# Patient Record
Sex: FEMALE | Race: WHITE | Employment: OTHER | URBAN - METROPOLITAN AREA
[De-identification: names, ages, dates, MRNs, and addresses within clinical notes are randomized per-mention and may not be internally consistent; named-entity substitution may affect disease eponyms.]

---

## 2017-01-01 ENCOUNTER — HOSPITAL ENCOUNTER (OUTPATIENT)
Dept: INFUSION THERAPY | Age: 67
Discharge: HOME | End: 2017-01-31
Attending: INTERNAL MEDICINE | Admitting: INTERNAL MEDICINE

## 2017-02-02 ENCOUNTER — HOSPITAL ENCOUNTER (OUTPATIENT)
Dept: CARDIOLOGY | Age: 67
Discharge: HOME | End: 2017-02-02
Attending: INTERNAL MEDICINE | Admitting: INTERNAL MEDICINE

## 2017-02-02 DIAGNOSIS — I35.0 NONRHEUMATIC AORTIC VALVE STENOSIS: ICD-10-CM

## 2017-02-02 LAB
LV EF: 50 %
LVEF MODALITY: NORMAL

## 2017-06-20 ENCOUNTER — TELEPHONE (OUTPATIENT)
Dept: FAMILY MEDICINE CLINIC | Age: 67
End: 2017-06-20

## 2017-06-29 ENCOUNTER — HOSPITAL ENCOUNTER (OUTPATIENT)
Dept: INFUSION THERAPY | Age: 67
Discharge: HOME | End: 2017-06-29
Attending: INTERNAL MEDICINE | Admitting: INTERNAL MEDICINE

## 2017-06-29 VITALS
SYSTOLIC BLOOD PRESSURE: 104 MMHG | HEART RATE: 76 BPM | TEMPERATURE: 98.5 F | DIASTOLIC BLOOD PRESSURE: 73 MMHG | RESPIRATION RATE: 16 BRPM

## 2017-06-29 LAB
HCT VFR BLD CALC: 50.5 % (ref 37–47)
HEMOGLOBIN: 17.4 GM/DL (ref 12.5–16)

## 2017-06-29 ASSESSMENT — PAIN SCALES - GENERAL: PAINLEVEL_OUTOF10: 0

## 2017-07-06 ENCOUNTER — HOSPITAL ENCOUNTER (OUTPATIENT)
Dept: INFUSION THERAPY | Age: 67
Discharge: HOME | End: 2017-07-31
Attending: INTERNAL MEDICINE | Admitting: INTERNAL MEDICINE

## 2017-07-06 VITALS — DIASTOLIC BLOOD PRESSURE: 65 MMHG | HEART RATE: 76 BPM | SYSTOLIC BLOOD PRESSURE: 109 MMHG | RESPIRATION RATE: 16 BRPM

## 2017-07-06 LAB
HCT VFR BLD CALC: 45.4 % (ref 37–47)
HEMOGLOBIN: 15.4 GM/DL (ref 12.5–16)

## 2017-07-13 ENCOUNTER — HOSPITAL ENCOUNTER (OUTPATIENT)
Dept: INFUSION THERAPY | Age: 67
Discharge: HOME | End: 2017-07-13
Attending: INTERNAL MEDICINE | Admitting: INTERNAL MEDICINE

## 2017-07-13 VITALS — DIASTOLIC BLOOD PRESSURE: 66 MMHG | HEART RATE: 72 BPM | SYSTOLIC BLOOD PRESSURE: 106 MMHG | RESPIRATION RATE: 16 BRPM

## 2017-07-13 LAB
HCT VFR BLD CALC: 43.6 % (ref 37–47)
HEMOGLOBIN: 14.6 GM/DL (ref 12.5–16)

## 2017-08-01 ENCOUNTER — HOSPITAL ENCOUNTER (OUTPATIENT)
Dept: OTHER | Age: 67
Discharge: HOME | End: 2017-08-31
Attending: INTERNAL MEDICINE | Admitting: INTERNAL MEDICINE

## 2017-09-19 ENCOUNTER — HOSPITAL ENCOUNTER (OUTPATIENT)
Dept: MRI IMAGING | Age: 67
Discharge: HOME | End: 2017-09-19
Attending: ORTHOPAEDIC SURGERY | Admitting: ORTHOPAEDIC SURGERY

## 2017-09-19 DIAGNOSIS — M19.012 ARTHRITIS OF LEFT SHOULDER REGION: ICD-10-CM

## 2018-09-13 ENCOUNTER — HOSPITAL ENCOUNTER (OUTPATIENT)
Dept: CT IMAGING | Age: 68
Discharge: HOME | End: 2018-09-13
Attending: UROLOGY | Admitting: UROLOGY

## 2018-09-13 DIAGNOSIS — R31.9 HEMATURIA SYNDROME: ICD-10-CM

## 2018-09-13 LAB
GFR AFRICAN AMERICAN: 48 ML/MIN/1.73M2
GFR NON-AFRICAN AMERICAN: 40 ML/MIN/1.73M2
POC CREATININE: 1.3 MG/DL (ref 0.6–1.1)

## 2018-09-13 RX ORDER — SODIUM CHLORIDE 0.9 % (FLUSH) 0.9 %
10 SYRINGE (ML) INJECTION ONCE
Status: COMPLETED | OUTPATIENT
Start: 2018-09-13 | End: 2018-09-13

## 2018-09-13 RX ADMIN — Medication 10 ML: at 08:39

## 2018-10-16 ENCOUNTER — HOSPITAL ENCOUNTER (OUTPATIENT)
Dept: INFUSION THERAPY | Age: 68
Setting detail: INFUSION SERIES
Discharge: HOME | End: 2018-10-16
Payer: MEDICARE

## 2018-10-16 VITALS
RESPIRATION RATE: 18 BRPM | TEMPERATURE: 98.2 F | HEART RATE: 81 BPM | DIASTOLIC BLOOD PRESSURE: 68 MMHG | OXYGEN SATURATION: 97 % | SYSTOLIC BLOOD PRESSURE: 108 MMHG

## 2018-10-16 PROCEDURE — 99195 PHLEBOTOMY: CPT

## 2018-10-16 NOTE — PROGRESS NOTES
Diagnosis:  POLYCYTHEMIA VERA               Pre-phlebotomy:  Pulse:  78   Blood Pressure:  139 75    Post-phlebotomy:  Pulse:  81   Blood Pressure:  108 68    Volume Removed:  537 GRAMS    Complications:  NONE    Comments:  NONE

## 2018-11-09 ENCOUNTER — HOSPITAL ENCOUNTER (OUTPATIENT)
Age: 68
Setting detail: SPECIMEN
Discharge: HOME | End: 2018-11-09
Payer: MEDICARE

## 2018-11-09 LAB — FERRITIN: 21 NG/ML (ref 15–150)

## 2018-11-09 PROCEDURE — 82728 ASSAY OF FERRITIN: CPT

## 2019-02-08 ENCOUNTER — HOSPITAL ENCOUNTER (OUTPATIENT)
Dept: INFUSION THERAPY | Age: 69
Setting detail: INFUSION SERIES
Discharge: HOME | End: 2019-02-08
Payer: MEDICARE

## 2019-02-08 VITALS
DIASTOLIC BLOOD PRESSURE: 87 MMHG | HEART RATE: 87 BPM | SYSTOLIC BLOOD PRESSURE: 120 MMHG | RESPIRATION RATE: 14 BRPM | OXYGEN SATURATION: 92 %

## 2019-02-08 PROCEDURE — 99195 PHLEBOTOMY: CPT

## 2019-02-08 PROCEDURE — 99211 OFF/OP EST MAY X REQ PHY/QHP: CPT

## 2019-02-08 NOTE — PROGRESS NOTES
Pt taken to room 6 chair 2, oriented to room, chair controls, and call light. Needs met at present. Call light in reach. Pt agreeable for plan of care.

## 2019-02-08 NOTE — PROGRESS NOTES
Diagnosis: Polycythemia vera    Pre-Phlebotomy: /92 , HR 85    Post-Phlebotomy: /87, HR 87    Volume Removed:  751 grams    Complications: None    Comments: Pt clotted off @@ 459 grams but tolerated procedure without complications.     [address]

## 2019-02-15 ENCOUNTER — HOSPITAL ENCOUNTER (OUTPATIENT)
Dept: INFUSION THERAPY | Age: 69
Setting detail: INFUSION SERIES
Discharge: HOME | End: 2019-02-15
Payer: MEDICARE

## 2019-02-15 VITALS
SYSTOLIC BLOOD PRESSURE: 126 MMHG | HEART RATE: 91 BPM | OXYGEN SATURATION: 93 % | WEIGHT: 170 LBS | BODY MASS INDEX: 30.12 KG/M2 | TEMPERATURE: 98 F | HEIGHT: 63 IN | DIASTOLIC BLOOD PRESSURE: 89 MMHG | RESPIRATION RATE: 14 BRPM

## 2019-02-15 PROCEDURE — 99211 OFF/OP EST MAY X REQ PHY/QHP: CPT

## 2019-02-15 PROCEDURE — 99195 PHLEBOTOMY: CPT

## 2019-02-15 ASSESSMENT — PAIN DESCRIPTION - ORIENTATION: ORIENTATION: RIGHT

## 2019-02-15 ASSESSMENT — PAIN DESCRIPTION - FREQUENCY: FREQUENCY: CONTINUOUS

## 2019-02-15 ASSESSMENT — PAIN DESCRIPTION - DESCRIPTORS: DESCRIPTORS: ACHING

## 2019-02-15 ASSESSMENT — PAIN SCALES - GENERAL: PAINLEVEL_OUTOF10: 5

## 2019-02-15 ASSESSMENT — PAIN DESCRIPTION - LOCATION: LOCATION: ARM

## 2019-02-15 ASSESSMENT — PAIN DESCRIPTION - ONSET: ONSET: ON-GOING

## 2019-02-15 ASSESSMENT — PAIN DESCRIPTION - PAIN TYPE: TYPE: CHRONIC PAIN

## 2019-02-15 NOTE — PROGRESS NOTES
Reviewed discharge instructions, voiced understanding, and copies of AVS given to take home. Pt tolerated therapeutic phlebotomy well, with no s/s of an allergic reaction. Pt to private auto via steady gait.

## 2019-02-15 NOTE — PROGRESS NOTES
Diagnosis: Polycythemia Vera    Pre-Phlebotomy: /89, HR 80    Post-Phlebotomy: /89, HR 91    Volume Removed: 245 grams    Complications: None    Comments: Tolerated procedure without complications.      [address]

## 2019-02-22 ENCOUNTER — HOSPITAL ENCOUNTER (OUTPATIENT)
Age: 69
Setting detail: SPECIMEN
Discharge: HOME | End: 2019-02-22
Payer: MEDICARE

## 2019-02-22 ENCOUNTER — HOSPITAL ENCOUNTER (OUTPATIENT)
Dept: INFUSION THERAPY | Age: 69
Setting detail: INFUSION SERIES
Discharge: HOME | End: 2019-02-22
Payer: MEDICARE

## 2019-02-22 VITALS
TEMPERATURE: 97.4 F | SYSTOLIC BLOOD PRESSURE: 116 MMHG | OXYGEN SATURATION: 95 % | HEART RATE: 89 BPM | RESPIRATION RATE: 14 BRPM | DIASTOLIC BLOOD PRESSURE: 74 MMHG

## 2019-02-22 LAB
ALBUMIN SERPL-MCNC: 4.4 GM/DL (ref 3.4–5)
ALP BLD-CCNC: 140 IU/L (ref 40–129)
ALT SERPL-CCNC: 11 U/L (ref 10–40)
ANION GAP SERPL CALCULATED.3IONS-SCNC: 14 MMOL/L (ref 4–16)
AST SERPL-CCNC: 14 IU/L (ref 15–37)
BILIRUB SERPL-MCNC: 0.4 MG/DL (ref 0–1)
BUN BLDV-MCNC: 9 MG/DL (ref 6–23)
CALCIUM SERPL-MCNC: 9 MG/DL (ref 8.3–10.6)
CHLORIDE BLD-SCNC: 98 MMOL/L (ref 99–110)
CO2: 29 MMOL/L (ref 21–32)
CREAT SERPL-MCNC: 1.2 MG/DL (ref 0.6–1.1)
FERRITIN: 38 NG/ML (ref 15–150)
GFR AFRICAN AMERICAN: 54 ML/MIN/1.73M2
GFR NON-AFRICAN AMERICAN: 45 ML/MIN/1.73M2
GLUCOSE BLD-MCNC: 125 MG/DL (ref 70–99)
IRON: 76 UG/DL (ref 37–145)
LACTATE DEHYDROGENASE: 250 IU/L (ref 120–246)
PCT TRANSFERRIN: 29 % (ref 10–44)
POTASSIUM SERPL-SCNC: 4.7 MMOL/L (ref 3.5–5.1)
SODIUM BLD-SCNC: 141 MMOL/L (ref 135–145)
TOTAL IRON BINDING CAPACITY: 266 UG/DL (ref 250–450)
TOTAL PROTEIN: 6.7 GM/DL (ref 6.4–8.2)
UNSATURATED IRON BINDING CAPACITY: 190 UG/DL (ref 110–370)

## 2019-02-22 PROCEDURE — 99211 OFF/OP EST MAY X REQ PHY/QHP: CPT

## 2019-02-22 PROCEDURE — 83540 ASSAY OF IRON: CPT

## 2019-02-22 PROCEDURE — 80053 COMPREHEN METABOLIC PANEL: CPT

## 2019-02-22 PROCEDURE — 99195 PHLEBOTOMY: CPT

## 2019-02-22 PROCEDURE — 83550 IRON BINDING TEST: CPT

## 2019-02-22 PROCEDURE — 83615 LACTATE (LD) (LDH) ENZYME: CPT

## 2019-02-22 PROCEDURE — 82728 ASSAY OF FERRITIN: CPT

## 2019-02-22 ASSESSMENT — PAIN SCALES - GENERAL: PAINLEVEL_OUTOF10: 0

## 2019-02-22 NOTE — DISCHARGE SUMMARY
Diagnosis: Polycythemia vera    Pre-Phlebotomy: /79, HR 77    Post-Phlebotomy: /74, HR 89    Volume Removed: 764 grams    Complications: None    Comments:  Tolerated procedure well    Kristie Locke

## 2019-02-22 NOTE — PROGRESS NOTES
Tolerated phlebotomy procedure well. No complications noted. Will return next week. Appt made for Thursday fEB. 28TH AT 8:00. Pt agreeable for date and time. Discharge instructions given. Voiced understanding. Ambulated to private auto per self.

## 2019-02-28 ENCOUNTER — HOSPITAL ENCOUNTER (OUTPATIENT)
Dept: INFUSION THERAPY | Age: 69
Setting detail: INFUSION SERIES
Discharge: HOME | End: 2019-02-28
Payer: MEDICARE

## 2019-02-28 VITALS
WEIGHT: 170 LBS | DIASTOLIC BLOOD PRESSURE: 66 MMHG | OXYGEN SATURATION: 93 % | BODY MASS INDEX: 30.11 KG/M2 | RESPIRATION RATE: 14 BRPM | HEART RATE: 87 BPM | SYSTOLIC BLOOD PRESSURE: 116 MMHG | TEMPERATURE: 97.5 F

## 2019-02-28 PROCEDURE — 99211 OFF/OP EST MAY X REQ PHY/QHP: CPT

## 2019-02-28 PROCEDURE — 99195 PHLEBOTOMY: CPT

## 2019-02-28 ASSESSMENT — PAIN SCALES - GENERAL: PAINLEVEL_OUTOF10: 0

## 2019-02-28 NOTE — DISCHARGE SUMMARY
Diagnosis: Polycythemia Vera    Pre-Phlebotomy: /86, HR 90    Post-Phlebotomy: /66, HR 87    Volume Removed: 435 grams    Complications: None    Comments: Tolerated procedure well. Denies any symptoms of dizziness or feeling faint. Discharged home.      [address]

## 2019-03-07 ENCOUNTER — HOSPITAL ENCOUNTER (OUTPATIENT)
Dept: INFUSION THERAPY | Age: 69
Setting detail: INFUSION SERIES
Discharge: HOME | End: 2019-03-07
Payer: MEDICARE

## 2019-03-07 VITALS
RESPIRATION RATE: 14 BRPM | TEMPERATURE: 98.7 F | DIASTOLIC BLOOD PRESSURE: 67 MMHG | OXYGEN SATURATION: 95 % | SYSTOLIC BLOOD PRESSURE: 102 MMHG | HEART RATE: 90 BPM

## 2019-03-07 PROCEDURE — 99211 OFF/OP EST MAY X REQ PHY/QHP: CPT

## 2019-03-07 PROCEDURE — 99195 PHLEBOTOMY: CPT

## 2019-03-07 ASSESSMENT — PAIN SCALES - GENERAL: PAINLEVEL_OUTOF10: 0

## 2019-03-14 ENCOUNTER — HOSPITAL ENCOUNTER (OUTPATIENT)
Dept: INFUSION THERAPY | Age: 69
Setting detail: INFUSION SERIES
Discharge: HOME | End: 2019-03-14
Payer: MEDICARE

## 2019-03-14 VITALS
HEART RATE: 86 BPM | SYSTOLIC BLOOD PRESSURE: 102 MMHG | RESPIRATION RATE: 14 BRPM | TEMPERATURE: 97.9 F | DIASTOLIC BLOOD PRESSURE: 57 MMHG

## 2019-03-14 PROCEDURE — 99211 OFF/OP EST MAY X REQ PHY/QHP: CPT

## 2019-03-14 PROCEDURE — 99195 PHLEBOTOMY: CPT

## 2019-03-14 NOTE — PROGRESS NOTES
Diagnosis: Polycythemia Vera    Pre-Phlebotomy: /72 HR 82    Post-Phlebotomy: /57, HR 86    Volume Removed: 545 grams    Complications: None    Comments:  Tolerated well    Albina Juarez RN

## 2019-03-14 NOTE — PLAN OF CARE
Ambulatory to unit room 2 for Therapeutic Phlebotomy. Reorientated to unit. Procedure and plan of care explained. Questions answered. Understanding verbalized.

## 2019-03-20 NOTE — PROGRESS NOTES
Lab Results called to LEBRON Kirk CNP. Order received to cancel Therapeutic Phlebotomy for 3/21/2019. She also states she will call in Iron Supplement  To patients Pharmacy. Patient called and notified also advised to get labs drawn again on 3/26/2019. Understanding verbalized

## 2019-03-21 ENCOUNTER — HOSPITAL ENCOUNTER (OUTPATIENT)
Dept: INFUSION THERAPY | Age: 69
Setting detail: INFUSION SERIES
Discharge: HOME | End: 2019-03-21
Payer: MEDICARE

## 2019-03-25 ENCOUNTER — HOSPITAL ENCOUNTER (OUTPATIENT)
Age: 69
Setting detail: SPECIMEN
Discharge: HOME | End: 2019-03-25
Payer: MEDICARE

## 2019-03-25 LAB
BACTERIA: ABNORMAL /HPF
BILIRUBIN URINE: NEGATIVE MG/DL
BLOOD, URINE: NEGATIVE
CLARITY: CLEAR
COLOR: YELLOW
GLUCOSE, URINE: NEGATIVE MG/DL
KETONES, URINE: NEGATIVE MG/DL
LEUKOCYTE ESTERASE, URINE: ABNORMAL
MUCUS: ABNORMAL HPF
NITRITE URINE, QUANTITATIVE: NEGATIVE
PH, URINE: 5 (ref 5–8)
PROTEIN UA: NEGATIVE MG/DL
RBC URINE: <1 /HPF (ref 0–6)
RENAL EPITHELIAL, UA: <1 /HPF
SPECIFIC GRAVITY UA: 1.01 (ref 1–1.03)
SQUAMOUS EPITHELIAL: <1 /HPF
TRICHOMONAS: ABNORMAL /HPF
UROBILINOGEN, URINE: NORMAL MG/DL (ref 0.2–1)
WBC UA: 7 /HPF (ref 0–5)

## 2019-03-25 PROCEDURE — 81001 URINALYSIS AUTO W/SCOPE: CPT

## 2019-03-28 ENCOUNTER — HOSPITAL ENCOUNTER (OUTPATIENT)
Dept: INFUSION THERAPY | Age: 69
Setting detail: INFUSION SERIES
End: 2019-03-28
Payer: MEDICARE

## 2019-05-06 ENCOUNTER — HOSPITAL ENCOUNTER (OUTPATIENT)
Age: 69
Setting detail: SPECIMEN
Discharge: HOME | End: 2019-05-06
Payer: MEDICARE

## 2019-05-06 LAB
ALBUMIN SERPL-MCNC: 4.4 GM/DL (ref 3.4–5)
ALP BLD-CCNC: 139 IU/L (ref 40–129)
ALT SERPL-CCNC: 12 U/L (ref 10–40)
ANION GAP SERPL CALCULATED.3IONS-SCNC: 12 MMOL/L (ref 4–16)
AST SERPL-CCNC: 13 IU/L (ref 15–37)
BILIRUB SERPL-MCNC: 0.6 MG/DL (ref 0–1)
BUN BLDV-MCNC: 9 MG/DL (ref 6–23)
CALCIUM SERPL-MCNC: 9.6 MG/DL (ref 8.3–10.6)
CHLORIDE BLD-SCNC: 102 MMOL/L (ref 99–110)
CO2: 27 MMOL/L (ref 21–32)
CREAT SERPL-MCNC: 1.1 MG/DL (ref 0.6–1.1)
FERRITIN: 45 NG/ML (ref 15–150)
FOLATE: 4.5 NG/ML (ref 3.1–17.5)
GFR AFRICAN AMERICAN: 59 ML/MIN/1.73M2
GFR NON-AFRICAN AMERICAN: 49 ML/MIN/1.73M2
GLUCOSE BLD-MCNC: 131 MG/DL (ref 70–99)
IRON: 184 UG/DL (ref 37–145)
PCT TRANSFERRIN: 72 % (ref 10–44)
POTASSIUM SERPL-SCNC: 4.7 MMOL/L (ref 3.5–5.1)
SODIUM BLD-SCNC: 141 MMOL/L (ref 135–145)
T4 FREE: 1.2 NG/DL (ref 0.9–1.8)
TOTAL IRON BINDING CAPACITY: 255 UG/DL (ref 250–450)
TOTAL PROTEIN: 6.8 GM/DL (ref 6.4–8.2)
TSH HIGH SENSITIVITY: 2.72 UIU/ML (ref 0.27–4.2)
UNSATURATED IRON BINDING CAPACITY: 71 UG/DL (ref 110–370)
VITAMIN B-12: 465.2 PG/ML (ref 211–911)

## 2019-05-06 PROCEDURE — 82728 ASSAY OF FERRITIN: CPT

## 2019-05-06 PROCEDURE — 80053 COMPREHEN METABOLIC PANEL: CPT

## 2019-05-06 PROCEDURE — 84439 ASSAY OF FREE THYROXINE: CPT

## 2019-05-06 PROCEDURE — 82607 VITAMIN B-12: CPT

## 2019-05-06 PROCEDURE — 83550 IRON BINDING TEST: CPT

## 2019-05-06 PROCEDURE — 84443 ASSAY THYROID STIM HORMONE: CPT

## 2019-05-06 PROCEDURE — 83540 ASSAY OF IRON: CPT

## 2019-05-06 PROCEDURE — 82746 ASSAY OF FOLIC ACID SERUM: CPT

## 2019-05-14 ENCOUNTER — TELEPHONE (OUTPATIENT)
Dept: INFUSION THERAPY | Age: 69
End: 2019-05-14

## 2019-05-17 ENCOUNTER — HOSPITAL ENCOUNTER (OUTPATIENT)
Dept: INFUSION THERAPY | Age: 69
Setting detail: INFUSION SERIES
Discharge: HOME | End: 2019-05-17
Payer: MEDICARE

## 2019-05-17 VITALS
HEART RATE: 68 BPM | RESPIRATION RATE: 14 BRPM | DIASTOLIC BLOOD PRESSURE: 78 MMHG | SYSTOLIC BLOOD PRESSURE: 145 MMHG | TEMPERATURE: 97.1 F

## 2019-05-17 LAB
ANISOCYTOSIS: ABNORMAL
BANDED NEUTROPHILS ABSOLUTE COUNT: 0.49 K/CU MM
BANDED NEUTROPHILS RELATIVE PERCENT: 4 % (ref 5–11)
BASOPHILS ABSOLUTE: 0.1 K/CU MM
BASOPHILS RELATIVE PERCENT: 1 % (ref 0–1)
DIFFERENTIAL TYPE: ABNORMAL
EOSINOPHILS ABSOLUTE: 0.6 K/CU MM
EOSINOPHILS RELATIVE PERCENT: 5 % (ref 0–3)
HCT VFR BLD CALC: 62.9 % (ref 37–47)
HEMOGLOBIN: 18.7 GM/DL (ref 12.5–16)
LYMPHOCYTES ABSOLUTE: 1.7 K/CU MM
LYMPHOCYTES RELATIVE PERCENT: 14 % (ref 24–44)
MACROCYTES: ABNORMAL
MCH RBC QN AUTO: 30.7 PG (ref 27–31)
MCHC RBC AUTO-ENTMCNC: 29.7 % (ref 32–36)
MCV RBC AUTO: 103.1 FL (ref 78–100)
MONOCYTES ABSOLUTE: 0.2 K/CU MM
MONOCYTES RELATIVE PERCENT: 2 % (ref 0–4)
PDW BLD-RTO: 17.7 % (ref 11.7–14.9)
PLATELET # BLD: 400 K/CU MM (ref 140–440)
PLT MORPHOLOGY: ABNORMAL
PMV BLD AUTO: 10.3 FL (ref 7.5–11.1)
POLYCHROMASIA: ABNORMAL
POST VITAL SIGNS: NORMAL
PRE VITAL SIGNS: NORMAL
RBC # BLD: 6.1 M/CU MM (ref 4.2–5.4)
SEGMENTED NEUTROPHILS ABSOLUTE COUNT: 9.1 K/CU MM
SEGMENTED NEUTROPHILS RELATIVE PERCENT: 74 % (ref 36–66)
TOTAL VOLUME: NORMAL
WBC # BLD: 12.2 K/CU MM (ref 4–10.5)
WITNESS: NORMAL

## 2019-05-17 PROCEDURE — 99195 PHLEBOTOMY: CPT

## 2019-05-17 PROCEDURE — 85027 COMPLETE CBC AUTOMATED: CPT

## 2019-05-17 PROCEDURE — 99211 OFF/OP EST MAY X REQ PHY/QHP: CPT

## 2019-05-17 PROCEDURE — 85007 BL SMEAR W/DIFF WBC COUNT: CPT

## 2019-05-17 NOTE — DISCHARGE SUMMARY
Reviewed discharge instructions, voiced understanding, and copies of AVS given to take home. Pt tolerated Phlebotomy well, with no s/s of an allergic reaction. Pt to private auto via self.

## 2019-05-17 NOTE — PROGRESS NOTES
Diagnosis: Polycythemia    Pre-Phlebotomy: BP BP (!) 148/77   Pulse 73   Temp 97.1 °F (36.2 °C) (Temporal)   Resp 16   /, HR     Post-Phlebotomy: /78, HR 73    Volume Removed: 708 grams    Complications: None    Comments: tolerated well    Yamila Sebastian

## 2019-05-23 ENCOUNTER — HOSPITAL ENCOUNTER (OUTPATIENT)
Dept: INFUSION THERAPY | Age: 69
Setting detail: INFUSION SERIES
Discharge: HOME | End: 2019-05-23
Payer: MEDICARE

## 2019-05-23 VITALS
SYSTOLIC BLOOD PRESSURE: 131 MMHG | TEMPERATURE: 98.1 F | DIASTOLIC BLOOD PRESSURE: 80 MMHG | RESPIRATION RATE: 14 BRPM | HEART RATE: 74 BPM

## 2019-05-23 LAB
POST VITAL SIGNS: NORMAL
PRE VITAL SIGNS: NORMAL
TOTAL VOLUME: NORMAL
WITNESS: NORMAL

## 2019-05-23 PROCEDURE — 99195 PHLEBOTOMY: CPT

## 2019-05-23 PROCEDURE — 99211 OFF/OP EST MAY X REQ PHY/QHP: CPT

## 2019-05-23 RX ORDER — LANOLIN ALCOHOL/MO/W.PET/CERES
1000 CREAM (GRAM) TOPICAL DAILY
COMMUNITY
End: 2021-06-25

## 2019-05-23 ASSESSMENT — PAIN SCALES - GENERAL: PAINLEVEL_OUTOF10: 0

## 2019-05-23 NOTE — PROGRESS NOTES
Diagnosis: Polycythemia Vera    Pre-Phlebotomy: /74, HR 72    Post-Phlebotomy: /80/, HR 74    Volume Removed: 198 grams    Complications: None    Comments: No problems    Suzy Devlin

## 2019-05-23 NOTE — DISCHARGE SUMMARY
Reviewed discharge instructions, voiced understanding, and copies of AVS given to take home. Pt tolerated Phlebotomy well, with no s/s of an allergic reaction. Pt to private auto via ambulation per self.

## 2019-05-31 ENCOUNTER — HOSPITAL ENCOUNTER (OUTPATIENT)
Dept: INFUSION THERAPY | Age: 69
Setting detail: INFUSION SERIES
Discharge: HOME | End: 2019-05-31
Payer: MEDICARE

## 2019-05-31 VITALS
SYSTOLIC BLOOD PRESSURE: 116 MMHG | HEART RATE: 80 BPM | OXYGEN SATURATION: 92 % | TEMPERATURE: 98.1 F | DIASTOLIC BLOOD PRESSURE: 69 MMHG | RESPIRATION RATE: 14 BRPM

## 2019-05-31 LAB
BASOPHILS ABSOLUTE: 0.2 K/CU MM
BASOPHILS RELATIVE PERCENT: 1 % (ref 0–1)
DIFFERENTIAL TYPE: ABNORMAL
EOSINOPHILS ABSOLUTE: 0.6 K/CU MM
EOSINOPHILS RELATIVE PERCENT: 3.1 % (ref 0–3)
HCT VFR BLD CALC: 61.1 % (ref 37–47)
HEMOGLOBIN: 18 GM/DL (ref 12.5–16)
IMMATURE NEUTROPHIL %: 2.2 % (ref 0–0.43)
LYMPHOCYTES ABSOLUTE: 2 K/CU MM
LYMPHOCYTES RELATIVE PERCENT: 10.4 % (ref 24–44)
MCH RBC QN AUTO: 30.3 PG (ref 27–31)
MCHC RBC AUTO-ENTMCNC: 29.5 % (ref 32–36)
MCV RBC AUTO: 102.9 FL (ref 78–100)
MONOCYTES ABSOLUTE: 0.5 K/CU MM
MONOCYTES RELATIVE PERCENT: 2.6 % (ref 0–4)
NUCLEATED RBC %: 0 %
PDW BLD-RTO: 16.3 % (ref 11.7–14.9)
PLATELET # BLD: 438 K/CU MM (ref 140–440)
PMV BLD AUTO: 9.6 FL (ref 7.5–11.1)
POST VITAL SIGNS: NORMAL
PRE VITAL SIGNS: NORMAL
RBC # BLD: 5.94 M/CU MM (ref 4.2–5.4)
SEGMENTED NEUTROPHILS ABSOLUTE COUNT: 15.3 K/CU MM
SEGMENTED NEUTROPHILS RELATIVE PERCENT: 80.7 % (ref 36–66)
TOTAL IMMATURE NEUTOROPHIL: 0.42 K/CU MM
TOTAL NUCLEATED RBC: 0 K/CU MM
TOTAL VOLUME: NORMAL
WBC # BLD: 19 K/CU MM (ref 4–10.5)
WITNESS: NORMAL

## 2019-05-31 PROCEDURE — 99211 OFF/OP EST MAY X REQ PHY/QHP: CPT

## 2019-05-31 PROCEDURE — 99195 PHLEBOTOMY: CPT

## 2019-05-31 PROCEDURE — 85025 COMPLETE CBC W/AUTO DIFF WBC: CPT

## 2019-05-31 ASSESSMENT — PAIN SCALES - GENERAL: PAINLEVEL_OUTOF10: 0

## 2019-06-07 ENCOUNTER — HOSPITAL ENCOUNTER (OUTPATIENT)
Dept: INFUSION THERAPY | Age: 69
Setting detail: INFUSION SERIES
Discharge: HOME | End: 2019-06-07
Payer: MEDICARE

## 2019-06-07 VITALS
SYSTOLIC BLOOD PRESSURE: 99 MMHG | TEMPERATURE: 98.6 F | OXYGEN SATURATION: 97 % | RESPIRATION RATE: 14 BRPM | DIASTOLIC BLOOD PRESSURE: 65 MMHG | HEART RATE: 77 BPM

## 2019-06-07 LAB
POST VITAL SIGNS: NORMAL
PRE VITAL SIGNS: NORMAL
TOTAL VOLUME: NORMAL
WITNESS: NORMAL

## 2019-06-07 PROCEDURE — 99211 OFF/OP EST MAY X REQ PHY/QHP: CPT

## 2019-06-07 PROCEDURE — 99195 PHLEBOTOMY: CPT

## 2019-06-07 ASSESSMENT — PAIN SCALES - GENERAL: PAINLEVEL_OUTOF10: 0

## 2019-06-07 NOTE — PROGRESS NOTES
Diagnosis: Polycythemia Vera    Pre-Phlebotomy: /79, HR 79    Post-Phlebotomy: BP 87//65 , HR 87    Volume Removed: 781 grams    Complications: None    Comments: Tolerated well, monitored pt longer d/t BP drop.     Lot #: [id number]    Expiration Date: AUG 96 [facility]

## 2019-06-14 ENCOUNTER — HOSPITAL ENCOUNTER (OUTPATIENT)
Dept: INFUSION THERAPY | Age: 69
Setting detail: INFUSION SERIES
Discharge: HOME | End: 2019-06-14
Payer: MEDICARE

## 2019-06-14 VITALS
RESPIRATION RATE: 14 BRPM | SYSTOLIC BLOOD PRESSURE: 102 MMHG | OXYGEN SATURATION: 96 % | HEART RATE: 82 BPM | DIASTOLIC BLOOD PRESSURE: 68 MMHG

## 2019-06-14 LAB
DIFFERENTIAL TYPE: ABNORMAL
EOSINOPHILS ABSOLUTE: 0.1 K/CU MM
EOSINOPHILS RELATIVE PERCENT: 1 % (ref 0–3)
HCT VFR BLD CALC: 55.8 % (ref 37–47)
HEMOGLOBIN: 16.3 GM/DL (ref 12.5–16)
LYMPHOCYTES ABSOLUTE: 1.8 K/CU MM
LYMPHOCYTES RELATIVE PERCENT: 15 % (ref 24–44)
MCH RBC QN AUTO: 30.4 PG (ref 27–31)
MCHC RBC AUTO-ENTMCNC: 29.2 % (ref 32–36)
MCV RBC AUTO: 103.9 FL (ref 78–100)
PDW BLD-RTO: 16.1 % (ref 11.7–14.9)
PLATELET # BLD: 365 K/CU MM (ref 140–440)
PMV BLD AUTO: 10 FL (ref 7.5–11.1)
POST VITAL SIGNS: NORMAL
PRE VITAL SIGNS: NORMAL
RBC # BLD: 5.37 M/CU MM (ref 4.2–5.4)
SEGMENTED NEUTROPHILS ABSOLUTE COUNT: 10.3 K/CU MM
SEGMENTED NEUTROPHILS RELATIVE PERCENT: 84 % (ref 36–66)
STOMATOCYTES: ABNORMAL
TOTAL VOLUME: NORMAL
WBC # BLD: 12.2 K/CU MM (ref 4–10.5)
WITNESS: NORMAL

## 2019-06-14 PROCEDURE — 85027 COMPLETE CBC AUTOMATED: CPT

## 2019-06-14 PROCEDURE — 99211 OFF/OP EST MAY X REQ PHY/QHP: CPT

## 2019-06-14 PROCEDURE — 99195 PHLEBOTOMY: CPT

## 2019-06-14 PROCEDURE — 85007 BL SMEAR W/DIFF WBC COUNT: CPT

## 2019-06-14 ASSESSMENT — PAIN SCALES - GENERAL: PAINLEVEL_OUTOF10: 0

## 2019-06-14 NOTE — PROGRESS NOTES
Reviewed discharge instructions, voiced understanding, and copies of AVS given to take home. Pt tolerated PHLEBOTOMY well, with no s/s of an allergic reaction. Pt to private auto via ambulation.

## 2019-06-14 NOTE — PROGRESS NOTES
Diagnosis: Erytrocytosis    Pre-Phlebotomy: /70, HR 78    Post-Phlebotomy: /68, HR 82    Volume Removed: 084 grams    Complications: None    Comments:  Tolerated well    Lot #: [id number]    Expiration Date: Aug.  21    [address]

## 2019-06-14 NOTE — PROGRESS NOTES
Pt taken to room 06, oriented to room, bed/chair controls, and call light. Needs met at present. Call light in reach. Pt agreeable for plan of care.

## 2019-06-20 ENCOUNTER — HOSPITAL ENCOUNTER (OUTPATIENT)
Dept: INFUSION THERAPY | Age: 69
Setting detail: INFUSION SERIES
Discharge: HOME | End: 2019-06-20
Payer: MEDICARE

## 2019-06-20 VITALS
DIASTOLIC BLOOD PRESSURE: 84 MMHG | SYSTOLIC BLOOD PRESSURE: 123 MMHG | RESPIRATION RATE: 16 BRPM | HEART RATE: 78 BPM | TEMPERATURE: 97.8 F

## 2019-06-20 PROCEDURE — 99195 PHLEBOTOMY: CPT

## 2019-06-20 PROCEDURE — 99211 OFF/OP EST MAY X REQ PHY/QHP: CPT

## 2019-06-20 NOTE — PLAN OF CARE
Ambulatory to unit room 6 for Therapeutic Phlebotomy. Reorientated to unit. Procedure and plan of care explained. Questions answered. Understanding verbalized.

## 2019-06-20 NOTE — PROGRESS NOTES
Reviewed discharge instructions, voiced understanding, and copies of AVS given to take home. Pt tolerated Phlebotomy well. Pt to private auto ambulatory.

## 2019-06-20 NOTE — PROGRESS NOTES
Diagnosis: Polycythemia Vera    Pre-Phlebotomy: /80, HR 72    Post-Phlebotomy: /84, HR 78    Volume Removed: 037USYWS    Complications: None    Comments:  Tolerated phlebotomy well    Lot #: [id number]    Expiration Date: AUG 96 [facility]

## 2019-06-21 LAB
POST VITAL SIGNS: NORMAL
PRE VITAL SIGNS: NORMAL
TOTAL VOLUME: NORMAL
WITNESS: NORMAL

## 2019-06-27 ENCOUNTER — HOSPITAL ENCOUNTER (OUTPATIENT)
Dept: INFUSION THERAPY | Age: 69
Setting detail: INFUSION SERIES
Discharge: HOME | End: 2019-06-27
Payer: MEDICARE

## 2019-06-27 VITALS
WEIGHT: 150 LBS | TEMPERATURE: 97.4 F | DIASTOLIC BLOOD PRESSURE: 74 MMHG | BODY MASS INDEX: 26.58 KG/M2 | HEART RATE: 75 BPM | RESPIRATION RATE: 16 BRPM | HEIGHT: 63 IN | OXYGEN SATURATION: 95 % | SYSTOLIC BLOOD PRESSURE: 119 MMHG

## 2019-06-27 LAB
ANISOCYTOSIS: ABNORMAL
DIFFERENTIAL TYPE: ABNORMAL
EOSINOPHILS ABSOLUTE: 0.4 K/CU MM
EOSINOPHILS RELATIVE PERCENT: 5 % (ref 0–3)
HCT VFR BLD CALC: 56.5 % (ref 37–47)
HEMOGLOBIN: 16.6 GM/DL (ref 12.5–16)
HYPERSEGMENTED NEUTROPHILS: PRESENT
LYMPHOCYTES ABSOLUTE: 1.6 K/CU MM
LYMPHOCYTES RELATIVE PERCENT: 21 % (ref 24–44)
MACROCYTES: ABNORMAL
MCH RBC QN AUTO: 30.9 PG (ref 27–31)
MCHC RBC AUTO-ENTMCNC: 29.4 % (ref 32–36)
MCV RBC AUTO: 105.2 FL (ref 78–100)
MONOCYTES ABSOLUTE: 0.3 K/CU MM
MONOCYTES RELATIVE PERCENT: 4 % (ref 0–4)
PDW BLD-RTO: 18 % (ref 11.7–14.9)
PLATELET # BLD: 313 K/CU MM (ref 140–440)
PLT MORPHOLOGY: ABNORMAL
PMV BLD AUTO: 10.1 FL (ref 7.5–11.1)
POLYCHROMASIA: ABNORMAL
RBC # BLD: 5.37 M/CU MM (ref 4.2–5.4)
SEGMENTED NEUTROPHILS ABSOLUTE COUNT: 5.5 K/CU MM
SEGMENTED NEUTROPHILS RELATIVE PERCENT: 70 % (ref 36–66)
STOMATOCYTES: ABNORMAL
WBC # BLD: 7.8 K/CU MM (ref 4–10.5)

## 2019-06-27 PROCEDURE — 99211 OFF/OP EST MAY X REQ PHY/QHP: CPT

## 2019-06-27 PROCEDURE — 85007 BL SMEAR W/DIFF WBC COUNT: CPT

## 2019-06-27 PROCEDURE — 85027 COMPLETE CBC AUTOMATED: CPT

## 2019-06-27 PROCEDURE — 99195 PHLEBOTOMY: CPT

## 2019-06-27 ASSESSMENT — PAIN SCALES - GENERAL: PAINLEVEL_OUTOF10: 0

## 2019-06-27 NOTE — PROGRESS NOTES
Diagnosis: Hemochromotosis    Pre-Phlebotomy: BP /87   Pulse 81   Temp 97.4 °F (36.3 °C) (Temporal)   Resp 14   Ht 5' 3\" (1.6 m)   Wt 150 lb (68 kg)   SpO2 95%   BMI 26.57 kg/m²       Post-Phlebotomy: /72, HR 74    Volume Removed: 733 grams    Complications: None    Comments:  Tolerated well    Lot #: [id number]    Expiration Date: 8-21    [address]

## 2019-06-27 NOTE — PLAN OF CARE
Ambulatory to unit room 5 for Rituxan. Reorientated to unit. Procedure and plan of care explained. Questions answered. Understanding verbalized.

## 2019-06-28 LAB
POST VITAL SIGNS: NORMAL
PRE VITAL SIGNS: NORMAL
TOTAL VOLUME: NORMAL
WITNESS: NORMAL

## 2019-07-05 ENCOUNTER — HOSPITAL ENCOUNTER (OUTPATIENT)
Dept: INFUSION THERAPY | Age: 69
Setting detail: INFUSION SERIES
Discharge: HOME | End: 2019-07-05
Payer: MEDICARE

## 2019-07-05 VITALS
TEMPERATURE: 96.9 F | SYSTOLIC BLOOD PRESSURE: 117 MMHG | HEART RATE: 89 BPM | RESPIRATION RATE: 16 BRPM | DIASTOLIC BLOOD PRESSURE: 72 MMHG | OXYGEN SATURATION: 93 %

## 2019-07-05 PROCEDURE — 99195 PHLEBOTOMY: CPT

## 2019-07-05 PROCEDURE — 99211 OFF/OP EST MAY X REQ PHY/QHP: CPT

## 2019-07-05 NOTE — PROGRESS NOTES
Diagnosis: Polycythemia vera    Pre-Phlebotomy: /70, HR 86    Post-Phlebotomy: /72 , HR 89    Volume Removed: 312 grams    Complications: None    Comments: Tolerated procedure well    Lot #: [id number]    Expiration Date: AUG.  96 [facility]

## 2019-07-05 NOTE — PROGRESS NOTES
Reviewed discharge instructions, voiced understanding, and copies of AVS given to take home. Pt tolerated Phlebotomy well. Pt to private auto via ambulation per self. Pt aware she is to f/u with Dr. Tobias Ruiz prior to any further phlebotomies.

## 2019-07-05 NOTE — PROGRESS NOTES
Pt taken to room 00, oriented to room, bed/chair controls, and call light. Needs met at present. Call light in reach. Pt agreeable for plan of care.

## 2019-07-12 ENCOUNTER — HOSPITAL ENCOUNTER (OUTPATIENT)
Age: 69
Setting detail: SPECIMEN
Discharge: HOME | End: 2019-07-12
Payer: MEDICARE

## 2019-07-12 LAB
ALBUMIN SERPL-MCNC: 4.4 GM/DL (ref 3.4–5)
ALP BLD-CCNC: 109 IU/L (ref 40–129)
ALT SERPL-CCNC: 10 U/L (ref 10–40)
ANION GAP SERPL CALCULATED.3IONS-SCNC: 10 MMOL/L (ref 4–16)
AST SERPL-CCNC: 11 IU/L (ref 15–37)
BILIRUB SERPL-MCNC: 0.4 MG/DL (ref 0–1)
BUN BLDV-MCNC: 9 MG/DL (ref 6–23)
CALCIUM SERPL-MCNC: 9.6 MG/DL (ref 8.3–10.6)
CHLORIDE BLD-SCNC: 105 MMOL/L (ref 99–110)
CO2: 27 MMOL/L (ref 21–32)
CREAT SERPL-MCNC: 1.1 MG/DL (ref 0.6–1.1)
FERRITIN: 10 NG/ML (ref 15–150)
FOLATE: 3.2 NG/ML (ref 3.1–17.5)
GFR AFRICAN AMERICAN: 59 ML/MIN/1.73M2
GFR NON-AFRICAN AMERICAN: 49 ML/MIN/1.73M2
GLUCOSE BLD-MCNC: 91 MG/DL (ref 70–99)
IRON: 31 UG/DL (ref 37–145)
LACTATE DEHYDROGENASE: 155 IU/L (ref 120–246)
PCT TRANSFERRIN: 10 % (ref 10–44)
POTASSIUM SERPL-SCNC: 4.1 MMOL/L (ref 3.5–5.1)
SODIUM BLD-SCNC: 142 MMOL/L (ref 135–145)
TOTAL IRON BINDING CAPACITY: 302 UG/DL (ref 250–450)
TOTAL PROTEIN: 6.6 GM/DL (ref 6.4–8.2)
UNSATURATED IRON BINDING CAPACITY: 271 UG/DL (ref 110–370)
VITAMIN B-12: 847.3 PG/ML (ref 211–911)

## 2019-07-12 PROCEDURE — 80053 COMPREHEN METABOLIC PANEL: CPT

## 2019-07-12 PROCEDURE — 82728 ASSAY OF FERRITIN: CPT

## 2019-07-12 PROCEDURE — 83615 LACTATE (LD) (LDH) ENZYME: CPT

## 2019-07-12 PROCEDURE — 82746 ASSAY OF FOLIC ACID SERUM: CPT

## 2019-07-12 PROCEDURE — 83550 IRON BINDING TEST: CPT

## 2019-07-12 PROCEDURE — 83540 ASSAY OF IRON: CPT

## 2019-07-12 PROCEDURE — 82607 VITAMIN B-12: CPT

## 2019-08-31 ENCOUNTER — HOSPITAL ENCOUNTER (OUTPATIENT)
Dept: INFUSION THERAPY | Age: 69
Setting detail: INFUSION SERIES
Discharge: HOME | End: 2019-08-31
Payer: MEDICARE

## 2019-08-31 VITALS
DIASTOLIC BLOOD PRESSURE: 75 MMHG | RESPIRATION RATE: 14 BRPM | TEMPERATURE: 97.8 F | SYSTOLIC BLOOD PRESSURE: 124 MMHG | HEART RATE: 80 BPM

## 2019-08-31 LAB
POST VITAL SIGNS: NORMAL
PRE VITAL SIGNS: NORMAL
TOTAL VOLUME: NORMAL
WITNESS: NORMAL

## 2019-08-31 PROCEDURE — 99211 OFF/OP EST MAY X REQ PHY/QHP: CPT

## 2019-08-31 PROCEDURE — 99195 PHLEBOTOMY: CPT

## 2019-08-31 ASSESSMENT — PAIN SCALES - GENERAL: PAINLEVEL_OUTOF10: 0

## 2019-08-31 NOTE — PROGRESS NOTES
Diagnosis: d45    Pre-Phlebotomy: /81, HR 80    Post-Phlebotomy: /75, HR 80    Volume Removed: 256 grams    Complications: none    Comments: none    LOT # [id number]    EXP: 11-21      Yudi Tse

## 2019-09-06 ENCOUNTER — TELEPHONE (OUTPATIENT)
Dept: INFUSION THERAPY | Age: 69
End: 2019-09-06

## 2019-09-06 ENCOUNTER — HOSPITAL ENCOUNTER (OUTPATIENT)
Dept: INFUSION THERAPY | Age: 69
Setting detail: INFUSION SERIES
Discharge: HOME | End: 2019-09-06
Payer: MEDICARE

## 2019-09-06 VITALS
RESPIRATION RATE: 14 BRPM | DIASTOLIC BLOOD PRESSURE: 79 MMHG | HEART RATE: 92 BPM | SYSTOLIC BLOOD PRESSURE: 116 MMHG | TEMPERATURE: 97.4 F

## 2019-09-06 LAB
BANDED NEUTROPHILS ABSOLUTE COUNT: 0.12 K/CU MM
BANDED NEUTROPHILS RELATIVE PERCENT: 1 % (ref 5–11)
DIFFERENTIAL TYPE: ABNORMAL
EOSINOPHILS ABSOLUTE: 0.5 K/CU MM
EOSINOPHILS RELATIVE PERCENT: 4 % (ref 0–3)
HCT VFR BLD CALC: 55.5 % (ref 37–47)
HEMOGLOBIN: 16.4 GM/DL (ref 12.5–16)
LYMPHOCYTES ABSOLUTE: 1.9 K/CU MM
LYMPHOCYTES RELATIVE PERCENT: 15 % (ref 24–44)
MCH RBC QN AUTO: 29.5 PG (ref 27–31)
MCHC RBC AUTO-ENTMCNC: 29.5 % (ref 32–36)
MCV RBC AUTO: 100 FL (ref 78–100)
MONOCYTES ABSOLUTE: 0.4 K/CU MM
MONOCYTES RELATIVE PERCENT: 3 % (ref 0–4)
PDW BLD-RTO: 17.6 % (ref 11.7–14.9)
PLATELET # BLD: 416 K/CU MM (ref 140–440)
PMV BLD AUTO: 10.1 FL (ref 7.5–11.1)
RBC # BLD: 5.55 M/CU MM (ref 4.2–5.4)
SEGMENTED NEUTROPHILS ABSOLUTE COUNT: 9.5 K/CU MM
SEGMENTED NEUTROPHILS RELATIVE PERCENT: 77 % (ref 36–66)
WBC # BLD: 12.4 K/CU MM (ref 4–10.5)

## 2019-09-06 PROCEDURE — 99195 PHLEBOTOMY: CPT

## 2019-09-06 PROCEDURE — 85007 BL SMEAR W/DIFF WBC COUNT: CPT

## 2019-09-06 PROCEDURE — 85027 COMPLETE CBC AUTOMATED: CPT

## 2019-09-06 PROCEDURE — 99211 OFF/OP EST MAY X REQ PHY/QHP: CPT

## 2019-09-06 ASSESSMENT — PAIN SCALES - GENERAL: PAINLEVEL_OUTOF10: 0

## 2019-09-06 NOTE — TELEPHONE ENCOUNTER
L/M WITH RANDELL AT [facility] ABOUT LAB WORK NOT HAVING PARAMETERS ON THE ORDER.

## 2019-09-06 NOTE — PROGRESS NOTES
Reviewed discharge instructions, voiced understanding, and copies of AVS given to take home. Pt tolerated Phlebotomy well. Pt to private auto via ambulation.

## 2019-09-06 NOTE — PROGRESS NOTES
Diagnosis: Polycythemia Vera    Pre-Phlebotomy: /79, HR 81    Post-Phlebotomy: /79, HR 92    Volume Removed: 803 grams    Complications: None    Comments:  Tolerated well    LOT # [id number]    EXP: 11-21 [address] [facility],[address]

## 2019-09-12 ENCOUNTER — HOSPITAL ENCOUNTER (OUTPATIENT)
Dept: INFUSION THERAPY | Age: 69
Setting detail: INFUSION SERIES
Discharge: HOME | End: 2019-09-12
Payer: MEDICARE

## 2019-09-12 VITALS
DIASTOLIC BLOOD PRESSURE: 76 MMHG | HEART RATE: 81 BPM | RESPIRATION RATE: 16 BRPM | OXYGEN SATURATION: 95 % | SYSTOLIC BLOOD PRESSURE: 116 MMHG

## 2019-09-12 LAB
BASOPHILS ABSOLUTE: 0.1 K/CU MM
BASOPHILS RELATIVE PERCENT: 1 % (ref 0–1)
DIFFERENTIAL TYPE: ABNORMAL
EOSINOPHILS ABSOLUTE: 0.3 K/CU MM
EOSINOPHILS RELATIVE PERCENT: 3 % (ref 0–3)
HCT VFR BLD CALC: 52.1 % (ref 37–47)
HEMOGLOBIN: 15.4 GM/DL (ref 12.5–16)
LYMPHOCYTES ABSOLUTE: 1.7 K/CU MM
LYMPHOCYTES RELATIVE PERCENT: 17 % (ref 24–44)
MCH RBC QN AUTO: 29.6 PG (ref 27–31)
MCHC RBC AUTO-ENTMCNC: 29.6 % (ref 32–36)
MCV RBC AUTO: 100.2 FL (ref 78–100)
MONOCYTES ABSOLUTE: 0.1 K/CU MM
MONOCYTES RELATIVE PERCENT: 1 % (ref 0–4)
PDW BLD-RTO: 17.3 % (ref 11.7–14.9)
PLATELET # BLD: 277 K/CU MM (ref 140–440)
PMV BLD AUTO: 11 FL (ref 7.5–11.1)
RBC # BLD: 5.2 M/CU MM (ref 4.2–5.4)
SEGMENTED NEUTROPHILS ABSOLUTE COUNT: 7.7 K/CU MM
SEGMENTED NEUTROPHILS RELATIVE PERCENT: 78 % (ref 36–66)
WBC # BLD: 9.9 K/CU MM (ref 4–10.5)

## 2019-09-12 PROCEDURE — 99211 OFF/OP EST MAY X REQ PHY/QHP: CPT

## 2019-09-12 PROCEDURE — 99195 PHLEBOTOMY: CPT

## 2019-09-12 PROCEDURE — 85007 BL SMEAR W/DIFF WBC COUNT: CPT

## 2019-09-12 PROCEDURE — 85027 COMPLETE CBC AUTOMATED: CPT

## 2019-09-12 ASSESSMENT — PAIN SCALES - GENERAL: PAINLEVEL_OUTOF10: 0

## 2019-09-12 NOTE — PROGRESS NOTES
Diagnosis: Polycythemia    Pre-Phlebotomy: BP BP (!) 145/80   Pulse 78   Resp 14   SpO2 95%       Post-Phlebotomy: /75, HR 80    Volume Removed: 157 grams    Complications: None    Comments:  Tolerated well    LOT # [id number]    EXP: 11-21      [address]

## 2019-09-20 ENCOUNTER — HOSPITAL ENCOUNTER (OUTPATIENT)
Dept: INFUSION THERAPY | Age: 69
Setting detail: INFUSION SERIES
Discharge: HOME | End: 2019-09-20
Payer: MEDICARE

## 2019-09-20 VITALS
HEIGHT: 63 IN | DIASTOLIC BLOOD PRESSURE: 67 MMHG | BODY MASS INDEX: 26.58 KG/M2 | OXYGEN SATURATION: 95 % | WEIGHT: 150 LBS | TEMPERATURE: 97.8 F | HEART RATE: 85 BPM | SYSTOLIC BLOOD PRESSURE: 107 MMHG | RESPIRATION RATE: 14 BRPM

## 2019-09-20 LAB
DIFFERENTIAL TYPE: ABNORMAL
EOSINOPHILS ABSOLUTE: 0.2 K/CU MM
EOSINOPHILS RELATIVE PERCENT: 2 % (ref 0–3)
HCT VFR BLD CALC: 51.5 % (ref 37–47)
HEMOGLOBIN: 15 GM/DL (ref 12.5–16)
LYMPHOCYTES ABSOLUTE: 0.9 K/CU MM
LYMPHOCYTES RELATIVE PERCENT: 10 % (ref 24–44)
MCH RBC QN AUTO: 29 PG (ref 27–31)
MCHC RBC AUTO-ENTMCNC: 29.1 % (ref 32–36)
MCV RBC AUTO: 99.6 FL (ref 78–100)
MONOCYTES ABSOLUTE: 0.1 K/CU MM
MONOCYTES RELATIVE PERCENT: 1 % (ref 0–4)
PDW BLD-RTO: 17 % (ref 11.7–14.9)
PLATELET # BLD: 345 K/CU MM (ref 140–440)
PMV BLD AUTO: 9.8 FL (ref 7.5–11.1)
POST VITAL SIGNS: NORMAL
PRE VITAL SIGNS: NORMAL
RBC # BLD: 5.17 M/CU MM (ref 4.2–5.4)
SEGMENTED NEUTROPHILS ABSOLUTE COUNT: 8 K/CU MM
SEGMENTED NEUTROPHILS RELATIVE PERCENT: 87 % (ref 36–66)
TOTAL VOLUME: NORMAL
WBC # BLD: 9.2 K/CU MM (ref 4–10.5)
WITNESS: NORMAL

## 2019-09-20 PROCEDURE — 99195 PHLEBOTOMY: CPT

## 2019-09-20 PROCEDURE — 85007 BL SMEAR W/DIFF WBC COUNT: CPT

## 2019-09-20 PROCEDURE — 99211 OFF/OP EST MAY X REQ PHY/QHP: CPT

## 2019-09-20 PROCEDURE — 85027 COMPLETE CBC AUTOMATED: CPT

## 2019-09-20 NOTE — PROGRESS NOTES
Diagnosis: Polycythemia    Pre-Phlebotomy: BP /77   Pulse 88   Resp 16   Ht 5' 3\" (1.6 m)   Wt 150 lb (68 kg)   SpO2 95%   BMI 26.57 kg/m²       Post-Phlebotomy: /68, HR 84    Volume Removed: 308 grams    Complications: None    Comments:  Tolerated well    LOT # [id number]    EXP: 11-21      [address]

## 2019-09-27 ENCOUNTER — HOSPITAL ENCOUNTER (OUTPATIENT)
Dept: INFUSION THERAPY | Age: 69
Setting detail: INFUSION SERIES
Discharge: HOME | End: 2019-09-27
Payer: MEDICARE

## 2019-09-27 VITALS
HEART RATE: 72 BPM | RESPIRATION RATE: 12 BRPM | SYSTOLIC BLOOD PRESSURE: 107 MMHG | DIASTOLIC BLOOD PRESSURE: 64 MMHG | OXYGEN SATURATION: 97 %

## 2019-09-27 LAB
ATYPICAL LYMPHOCYTE ABSOLUTE COUNT: ABNORMAL
BASOPHILS ABSOLUTE: 0.2 K/CU MM
BASOPHILS RELATIVE PERCENT: 2 % (ref 0–1)
DIFFERENTIAL TYPE: ABNORMAL
EOSINOPHILS ABSOLUTE: 0.7 K/CU MM
EOSINOPHILS RELATIVE PERCENT: 7 % (ref 0–3)
HCT VFR BLD CALC: 48.5 % (ref 37–47)
HEMOGLOBIN: 14.2 GM/DL (ref 12.5–16)
LYMPHOCYTES ABSOLUTE: 2 K/CU MM
LYMPHOCYTES RELATIVE PERCENT: 20 % (ref 24–44)
MCH RBC QN AUTO: 28.9 PG (ref 27–31)
MCHC RBC AUTO-ENTMCNC: 29.3 % (ref 32–36)
MCV RBC AUTO: 98.6 FL (ref 78–100)
PDW BLD-RTO: 16.9 % (ref 11.7–14.9)
PLATELET # BLD: 367 K/CU MM (ref 140–440)
PMV BLD AUTO: 9.9 FL (ref 7.5–11.1)
RBC # BLD: 4.92 M/CU MM (ref 4.2–5.4)
SEGMENTED NEUTROPHILS ABSOLUTE COUNT: 7.3 K/CU MM
SEGMENTED NEUTROPHILS RELATIVE PERCENT: 71 % (ref 36–66)
WBC # BLD: 10.2 K/CU MM (ref 4–10.5)

## 2019-09-27 PROCEDURE — 85027 COMPLETE CBC AUTOMATED: CPT

## 2019-09-27 PROCEDURE — 85007 BL SMEAR W/DIFF WBC COUNT: CPT

## 2019-09-27 PROCEDURE — 99211 OFF/OP EST MAY X REQ PHY/QHP: CPT

## 2019-09-27 PROCEDURE — 99195 PHLEBOTOMY: CPT

## 2019-09-27 NOTE — PROGRESS NOTES
Diagnosis: Polycythemia Vera    Pre-Phlebotomy: BP /69   Pulse 78   Resp 14   SpO2 97%     Post-Phlebotomy: /62,  HR 80    Volume Removed: 476 grams    Complications: None    Comments:  Tolerated Phlebotomy well    LOT # [id number]    EXP: 11-21 [address] [facility],[address]

## 2019-10-04 ENCOUNTER — HOSPITAL ENCOUNTER (OUTPATIENT)
Dept: INFUSION THERAPY | Age: 69
Setting detail: INFUSION SERIES
Discharge: HOME | End: 2019-10-04
Payer: MEDICARE

## 2019-10-04 VITALS
BODY MASS INDEX: 26.58 KG/M2 | DIASTOLIC BLOOD PRESSURE: 69 MMHG | WEIGHT: 150 LBS | HEIGHT: 63 IN | HEART RATE: 85 BPM | TEMPERATURE: 97.8 F | RESPIRATION RATE: 12 BRPM | SYSTOLIC BLOOD PRESSURE: 110 MMHG

## 2019-10-04 LAB
HCT VFR BLD CALC: 46.5 % (ref 37–47)
HEMOGLOBIN: 13.5 GM/DL (ref 12.5–16)
MCH RBC QN AUTO: 29.2 PG (ref 27–31)
MCHC RBC AUTO-ENTMCNC: 29 % (ref 32–36)
MCV RBC AUTO: 100.4 FL (ref 78–100)
PDW BLD-RTO: 17.2 % (ref 11.7–14.9)
PLATELET # BLD: 302 K/CU MM (ref 140–440)
PMV BLD AUTO: 10.9 FL (ref 7.5–11.1)
RBC # BLD: 4.63 M/CU MM (ref 4.2–5.4)
WBC # BLD: 9.9 K/CU MM (ref 4–10.5)

## 2019-10-04 PROCEDURE — 85027 COMPLETE CBC AUTOMATED: CPT

## 2019-10-04 PROCEDURE — 99211 OFF/OP EST MAY X REQ PHY/QHP: CPT

## 2019-10-04 PROCEDURE — 99195 PHLEBOTOMY: CPT

## 2019-10-04 RX ORDER — ONDANSETRON 2 MG/ML
8 INJECTION INTRAMUSCULAR; INTRAVENOUS ONCE
Status: DISCONTINUED | OUTPATIENT
Start: 2019-10-04 | End: 2019-10-04

## 2019-10-04 RX ORDER — SODIUM CHLORIDE 9 MG/ML
INJECTION, SOLUTION INTRAVENOUS ONCE
Status: DISCONTINUED | OUTPATIENT
Start: 2019-10-04 | End: 2019-10-04

## 2019-10-04 NOTE — PROGRESS NOTES
Diagnosis: Polycythemia Vera    Pre-Phlebotomy: /81, HR 80    Post-Phlebotomy: /68, HR 78    Volume Removed: 683 grams    Complications: None     Comments:  Tolerated procedure well    LOT # [id number]    EXP: 11-21 [address] [facility],[address]

## 2019-10-04 NOTE — PLAN OF CARE
Ambulatory to unit room 6 for Phlebotomy. Reorientated to unit. Procedure and plan of care explained. Questions answered. Understanding verbalized.

## 2019-10-11 ENCOUNTER — HOSPITAL ENCOUNTER (OUTPATIENT)
Dept: INFUSION THERAPY | Age: 69
Setting detail: INFUSION SERIES
Discharge: HOME | End: 2019-10-11
Payer: MEDICARE

## 2019-10-11 VITALS
TEMPERATURE: 97 F | SYSTOLIC BLOOD PRESSURE: 100 MMHG | RESPIRATION RATE: 14 BRPM | HEART RATE: 82 BPM | OXYGEN SATURATION: 97 % | DIASTOLIC BLOOD PRESSURE: 64 MMHG

## 2019-10-11 LAB
ANISOCYTOSIS: ABNORMAL
BANDED NEUTROPHILS ABSOLUTE COUNT: 0.38 K/CU MM
BANDED NEUTROPHILS RELATIVE PERCENT: 3 % (ref 5–11)
BASOPHILS ABSOLUTE: 0.1 K/CU MM
BASOPHILS RELATIVE PERCENT: 1 % (ref 0–1)
DIFFERENTIAL TYPE: ABNORMAL
EOSINOPHILS ABSOLUTE: 0.1 K/CU MM
EOSINOPHILS RELATIVE PERCENT: 1 % (ref 0–3)
HCT VFR BLD CALC: 47.4 % (ref 37–47)
HEMOGLOBIN: 13.5 GM/DL (ref 12.5–16)
LYMPHOCYTES ABSOLUTE: 0.6 K/CU MM
LYMPHOCYTES RELATIVE PERCENT: 5 % (ref 24–44)
MACROCYTES: ABNORMAL
MCH RBC QN AUTO: 28.8 PG (ref 27–31)
MCHC RBC AUTO-ENTMCNC: 28.5 % (ref 32–36)
MCV RBC AUTO: 101.1 FL (ref 78–100)
MONOCYTES ABSOLUTE: 0.1 K/CU MM
MONOCYTES RELATIVE PERCENT: 1 % (ref 0–4)
PDW BLD-RTO: 17 % (ref 11.7–14.9)
PLATELET # BLD: 350 K/CU MM (ref 140–440)
PLT MORPHOLOGY: ABNORMAL
PMV BLD AUTO: 10 FL (ref 7.5–11.1)
POLYCHROMASIA: ABNORMAL
RBC # BLD: 4.69 M/CU MM (ref 4.2–5.4)
SEGMENTED NEUTROPHILS ABSOLUTE COUNT: 11.2 K/CU MM
SEGMENTED NEUTROPHILS RELATIVE PERCENT: 89 % (ref 36–66)
WBC # BLD: 12.5 K/CU MM (ref 4–10.5)

## 2019-10-11 PROCEDURE — 85007 BL SMEAR W/DIFF WBC COUNT: CPT

## 2019-10-11 PROCEDURE — 85027 COMPLETE CBC AUTOMATED: CPT

## 2019-10-11 NOTE — PROGRESS NOTES
Diagnosis: D45    Pre-Phlebotomy: /71 , HR 90    Post-Phlebotomy: /64, HR 82    Volume Removed: 853 grams    Complications: None    Comments: Tolerated phlebotomy well    LOT # [id number]    EXP: 11-21 [address] [facility],[address]

## 2019-10-18 ENCOUNTER — HOSPITAL ENCOUNTER (OUTPATIENT)
Dept: INFUSION THERAPY | Age: 69
Setting detail: INFUSION SERIES
Discharge: HOME | End: 2019-10-18
Payer: MEDICARE

## 2019-10-18 VITALS
RESPIRATION RATE: 14 BRPM | SYSTOLIC BLOOD PRESSURE: 133 MMHG | OXYGEN SATURATION: 96 % | DIASTOLIC BLOOD PRESSURE: 78 MMHG | HEART RATE: 89 BPM | TEMPERATURE: 97 F

## 2019-10-18 LAB
DIFFERENTIAL TYPE: ABNORMAL
EOSINOPHILS ABSOLUTE: 0.1 K/CU MM
EOSINOPHILS RELATIVE PERCENT: 1 % (ref 0–3)
HCT VFR BLD CALC: 42.2 % (ref 37–47)
HEMOGLOBIN: 11.8 GM/DL (ref 12.5–16)
LYMPHOCYTES ABSOLUTE: 2.9 K/CU MM
LYMPHOCYTES RELATIVE PERCENT: 21 % (ref 24–44)
MCH RBC QN AUTO: 28.4 PG (ref 27–31)
MCHC RBC AUTO-ENTMCNC: 28 % (ref 32–36)
MCV RBC AUTO: 101.7 FL (ref 78–100)
MONOCYTES ABSOLUTE: 0.1 K/CU MM
MONOCYTES RELATIVE PERCENT: 1 % (ref 0–4)
PDW BLD-RTO: 16.6 % (ref 11.7–14.9)
PLATELET # BLD: 335 K/CU MM (ref 140–440)
PMV BLD AUTO: 9.7 FL (ref 7.5–11.1)
RBC # BLD: 4.15 M/CU MM (ref 4.2–5.4)
SEGMENTED NEUTROPHILS ABSOLUTE COUNT: 10.6 K/CU MM
SEGMENTED NEUTROPHILS RELATIVE PERCENT: 77 % (ref 36–66)
WBC # BLD: 13.7 K/CU MM (ref 4–10.5)

## 2019-10-18 PROCEDURE — 99211 OFF/OP EST MAY X REQ PHY/QHP: CPT

## 2019-10-18 PROCEDURE — 85025 COMPLETE CBC W/AUTO DIFF WBC: CPT

## 2019-10-18 NOTE — PROGRESS NOTES
Reviewed discharge instructions, voiced understanding, and copies of AVS given to take home. Pt tolerated blood draw well. Will f/u with Dr. Marcial Allen next Friday and pending CBC results will return next week for a phlebotomy. Pt to private auto via ambulation.

## 2019-10-25 ENCOUNTER — HOSPITAL ENCOUNTER (OUTPATIENT)
Dept: INFUSION THERAPY | Age: 69
Setting detail: INFUSION SERIES
End: 2019-10-25
Payer: MEDICARE

## 2019-10-25 ENCOUNTER — HOSPITAL ENCOUNTER (OUTPATIENT)
Age: 69
Setting detail: SPECIMEN
Discharge: HOME | End: 2019-10-25
Payer: MEDICARE

## 2019-10-25 LAB
ALBUMIN SERPL-MCNC: 4.5 GM/DL (ref 3.4–5)
ALP BLD-CCNC: 119 IU/L (ref 40–129)
ALT SERPL-CCNC: 8 U/L (ref 10–40)
ANION GAP SERPL CALCULATED.3IONS-SCNC: 9 MMOL/L (ref 4–16)
AST SERPL-CCNC: 12 IU/L (ref 15–37)
BILIRUB SERPL-MCNC: 0.4 MG/DL (ref 0–1)
BUN BLDV-MCNC: 12 MG/DL (ref 6–23)
CALCIUM SERPL-MCNC: 9.6 MG/DL (ref 8.3–10.6)
CHLORIDE BLD-SCNC: 100 MMOL/L (ref 99–110)
CO2: 28 MMOL/L (ref 21–32)
CREAT SERPL-MCNC: 0.9 MG/DL (ref 0.6–1.1)
FERRITIN: 26 NG/ML (ref 15–150)
GFR AFRICAN AMERICAN: >60 ML/MIN/1.73M2
GFR NON-AFRICAN AMERICAN: >60 ML/MIN/1.73M2
GLUCOSE BLD-MCNC: 89 MG/DL (ref 70–99)
IRON: 19 UG/DL (ref 37–145)
LACTATE DEHYDROGENASE: 151 IU/L (ref 120–246)
PCT TRANSFERRIN: 6 % (ref 10–44)
POTASSIUM SERPL-SCNC: 3.9 MMOL/L (ref 3.5–5.1)
SODIUM BLD-SCNC: 137 MMOL/L (ref 135–145)
TOTAL IRON BINDING CAPACITY: 321 UG/DL (ref 250–450)
TOTAL PROTEIN: 6.5 GM/DL (ref 6.4–8.2)
UNSATURATED IRON BINDING CAPACITY: 302 UG/DL (ref 110–370)

## 2019-10-25 PROCEDURE — 83550 IRON BINDING TEST: CPT

## 2019-10-25 PROCEDURE — 82728 ASSAY OF FERRITIN: CPT

## 2019-10-25 PROCEDURE — 83540 ASSAY OF IRON: CPT

## 2019-10-25 PROCEDURE — 83615 LACTATE (LD) (LDH) ENZYME: CPT

## 2019-10-25 PROCEDURE — 80053 COMPREHEN METABOLIC PANEL: CPT

## 2019-11-05 ENCOUNTER — HOSPITAL ENCOUNTER (OUTPATIENT)
Age: 69
Setting detail: SPECIMEN
Discharge: HOME | End: 2019-11-05
Payer: MEDICARE

## 2019-11-05 LAB
BACTERIA: ABNORMAL /HPF
BILIRUBIN URINE: NEGATIVE MG/DL
BLOOD, URINE: NEGATIVE
CLARITY: CLEAR
COLOR: ABNORMAL
GLUCOSE, URINE: NEGATIVE MG/DL
KETONES, URINE: NEGATIVE MG/DL
LEUKOCYTE ESTERASE, URINE: ABNORMAL
NITRITE URINE, QUANTITATIVE: NEGATIVE
PH, URINE: 6 (ref 5–8)
PROTEIN UA: NEGATIVE MG/DL
RBC URINE: ABNORMAL /HPF (ref 0–6)
SPECIFIC GRAVITY UA: 1 (ref 1–1.03)
SQUAMOUS EPITHELIAL: <1 /HPF
TRANSITIONAL EPITHELIAL: <1 /HPF
TRICHOMONAS: ABNORMAL /HPF
UROBILINOGEN, URINE: NORMAL MG/DL (ref 0.2–1)
WBC UA: 3 /HPF (ref 0–5)
YEAST: ABNORMAL /HPF

## 2019-11-05 PROCEDURE — 81001 URINALYSIS AUTO W/SCOPE: CPT

## 2019-12-16 ENCOUNTER — HOSPITAL ENCOUNTER (OUTPATIENT)
Age: 69
Setting detail: SPECIMEN
Discharge: HOME | End: 2019-12-16
Payer: MEDICARE

## 2019-12-16 ENCOUNTER — HOSPITAL ENCOUNTER (OUTPATIENT)
Dept: INFUSION THERAPY | Age: 69
Setting detail: INFUSION SERIES
Discharge: HOME | End: 2019-12-16
Payer: MEDICARE

## 2019-12-16 VITALS — HEART RATE: 101 BPM | DIASTOLIC BLOOD PRESSURE: 80 MMHG | SYSTOLIC BLOOD PRESSURE: 117 MMHG | OXYGEN SATURATION: 93 %

## 2019-12-16 LAB
ALBUMIN SERPL-MCNC: 4.7 GM/DL (ref 3.4–5)
ALP BLD-CCNC: 171 IU/L (ref 40–129)
ALT SERPL-CCNC: 15 U/L (ref 10–40)
ANION GAP SERPL CALCULATED.3IONS-SCNC: 15 MMOL/L (ref 4–16)
AST SERPL-CCNC: 16 IU/L (ref 15–37)
BILIRUB SERPL-MCNC: 0.5 MG/DL (ref 0–1)
BUN BLDV-MCNC: 12 MG/DL (ref 6–23)
CALCIUM SERPL-MCNC: 9.9 MG/DL (ref 8.3–10.6)
CHLORIDE BLD-SCNC: 101 MMOL/L (ref 99–110)
CO2: 25 MMOL/L (ref 21–32)
CREAT SERPL-MCNC: 1.2 MG/DL (ref 0.6–1.1)
FERRITIN: 94 NG/ML (ref 15–150)
GFR AFRICAN AMERICAN: 54 ML/MIN/1.73M2
GFR NON-AFRICAN AMERICAN: 44 ML/MIN/1.73M2
GLUCOSE BLD-MCNC: 89 MG/DL (ref 70–99)
IRON: 49 UG/DL (ref 37–145)
LACTATE DEHYDROGENASE: 317 IU/L (ref 120–246)
PCT TRANSFERRIN: 16 % (ref 10–44)
POTASSIUM SERPL-SCNC: 5.2 MMOL/L (ref 3.5–5.1)
SODIUM BLD-SCNC: 141 MMOL/L (ref 135–145)
TOTAL IRON BINDING CAPACITY: 309 UG/DL (ref 250–450)
TOTAL PROTEIN: 7.4 GM/DL (ref 6.4–8.2)
UNSATURATED IRON BINDING CAPACITY: 260 UG/DL (ref 110–370)

## 2019-12-16 PROCEDURE — 83550 IRON BINDING TEST: CPT

## 2019-12-16 PROCEDURE — 99211 OFF/OP EST MAY X REQ PHY/QHP: CPT

## 2019-12-16 PROCEDURE — 82728 ASSAY OF FERRITIN: CPT

## 2019-12-16 PROCEDURE — 83615 LACTATE (LD) (LDH) ENZYME: CPT

## 2019-12-16 PROCEDURE — 80053 COMPREHEN METABOLIC PANEL: CPT

## 2019-12-16 PROCEDURE — 99195 PHLEBOTOMY: CPT

## 2019-12-16 PROCEDURE — 83540 ASSAY OF IRON: CPT

## 2019-12-16 ASSESSMENT — PAIN SCALES - GENERAL: PAINLEVEL_OUTOF10: 0

## 2019-12-16 NOTE — PROGRESS NOTES
Diagnosis: Polycythemia    Pre-Phlebotomy: /80, HR 83    Post-Phlebotomy: /86,     Volume Removed: 815 grams    Complications: None    Comments:  Tolerated well    LOT # [id number]  EXP: 4-2022      Bernardino Thomas

## 2019-12-16 NOTE — PROGRESS NOTES
Tolerated Phlebotomy well. Reviewed discharge instructions, voiced understanding, and copies of AVS given to take home. Pt to exit via ambulation. No orders of the defined types were placed in this encounter.

## 2019-12-17 LAB
POST VITAL SIGNS: NORMAL
PRE VITAL SIGNS: NORMAL
TOTAL VOLUME: NORMAL
WITNESS: NORMAL

## 2019-12-19 ENCOUNTER — HOSPITAL ENCOUNTER (OUTPATIENT)
Dept: INFUSION THERAPY | Age: 69
Setting detail: INFUSION SERIES
Discharge: HOME | End: 2019-12-19
Payer: MEDICARE

## 2019-12-19 VITALS — DIASTOLIC BLOOD PRESSURE: 82 MMHG | SYSTOLIC BLOOD PRESSURE: 117 MMHG | HEART RATE: 96 BPM | OXYGEN SATURATION: 96 %

## 2019-12-19 LAB
POST VITAL SIGNS: NORMAL
PRE VITAL SIGNS: NORMAL
TOTAL VOLUME: NORMAL
WITNESS: NORMAL

## 2019-12-19 PROCEDURE — 99211 OFF/OP EST MAY X REQ PHY/QHP: CPT

## 2019-12-19 PROCEDURE — 99195 PHLEBOTOMY: CPT

## 2019-12-19 NOTE — PROGRESS NOTES
Prior to discharge, the After Visit Summary Discharge Instructions were reviewed with the patient. She was offered a printed version of the AVS, but declined the offer. No orders of the defined types were placed in this encounter.

## 2019-12-19 NOTE — PROGRESS NOTES
Diagnosis: polycythemia vera    Pre-Phlebotomy: /78, HR 88    Post-Phlebotomy: BP /82   Pulse 96   SpO2 96%       Volume Removed: 914 grams    Complications: None    Comments:  Tolerated well    LOT # [id number]  EXP: 4-2022      Shelbie Meckel

## 2019-12-23 ENCOUNTER — HOSPITAL ENCOUNTER (OUTPATIENT)
Dept: INFUSION THERAPY | Age: 69
Setting detail: INFUSION SERIES
Discharge: HOME | End: 2019-12-23
Payer: MEDICARE

## 2019-12-23 VITALS
DIASTOLIC BLOOD PRESSURE: 75 MMHG | TEMPERATURE: 97.5 F | HEART RATE: 80 BPM | RESPIRATION RATE: 16 BRPM | SYSTOLIC BLOOD PRESSURE: 133 MMHG | OXYGEN SATURATION: 95 %

## 2019-12-23 LAB
ANISOCYTOSIS: ABNORMAL
DIFFERENTIAL TYPE: ABNORMAL
EOSINOPHILS ABSOLUTE: 0.7 K/CU MM
EOSINOPHILS RELATIVE PERCENT: 5 % (ref 0–3)
HCT VFR BLD CALC: 53.1 % (ref 37–47)
HEMOGLOBIN: 16.2 GM/DL (ref 12.5–16)
LYMPHOCYTES ABSOLUTE: 1.7 K/CU MM
LYMPHOCYTES RELATIVE PERCENT: 13 % (ref 24–44)
MACROCYTES: ABNORMAL
MCH RBC QN AUTO: 32.7 PG (ref 27–31)
MCHC RBC AUTO-ENTMCNC: 30.5 % (ref 32–36)
MCV RBC AUTO: 107.3 FL (ref 78–100)
MONOCYTES ABSOLUTE: 0.3 K/CU MM
MONOCYTES RELATIVE PERCENT: 2 % (ref 0–4)
PDW BLD-RTO: 19.5 % (ref 11.7–14.9)
PLATELET # BLD: 376 K/CU MM (ref 140–440)
PMV BLD AUTO: 9.9 FL (ref 7.5–11.1)
POLYCHROMASIA: ABNORMAL
RBC # BLD: 4.95 M/CU MM (ref 4.2–5.4)
SEGMENTED NEUTROPHILS ABSOLUTE COUNT: 10.4 K/CU MM
SEGMENTED NEUTROPHILS RELATIVE PERCENT: 80 % (ref 36–66)
WBC # BLD: 13.1 K/CU MM (ref 4–10.5)
WBC # BLD: ABNORMAL 10*3/UL

## 2019-12-23 PROCEDURE — 85027 COMPLETE CBC AUTOMATED: CPT

## 2019-12-23 PROCEDURE — 99195 PHLEBOTOMY: CPT

## 2019-12-23 PROCEDURE — 85007 BL SMEAR W/DIFF WBC COUNT: CPT

## 2019-12-23 PROCEDURE — 99211 OFF/OP EST MAY X REQ PHY/QHP: CPT

## 2019-12-24 LAB
POST VITAL SIGNS: NORMAL
PRE VITAL SIGNS: NORMAL
TOTAL VOLUME: NORMAL
WITNESS: NORMAL

## 2019-12-27 ENCOUNTER — HOSPITAL ENCOUNTER (OUTPATIENT)
Dept: INFUSION THERAPY | Age: 69
Setting detail: INFUSION SERIES
Discharge: HOME | End: 2019-12-27
Payer: MEDICARE

## 2019-12-27 VITALS
OXYGEN SATURATION: 96 % | HEART RATE: 86 BPM | TEMPERATURE: 126 F | SYSTOLIC BLOOD PRESSURE: 111 MMHG | DIASTOLIC BLOOD PRESSURE: 72 MMHG | RESPIRATION RATE: 16 BRPM

## 2019-12-27 LAB
ANISOCYTOSIS: ABNORMAL
BASOPHILS ABSOLUTE: 0.2 K/CU MM
BASOPHILS RELATIVE PERCENT: 2 % (ref 0–1)
DIFFERENTIAL TYPE: ABNORMAL
HCT VFR BLD CALC: 51.1 % (ref 37–47)
HEMOGLOBIN: 15.7 GM/DL (ref 12.5–16)
LYMPHOCYTES ABSOLUTE: 1.4 K/CU MM
LYMPHOCYTES RELATIVE PERCENT: 12 % (ref 24–44)
MCH RBC QN AUTO: 32.5 PG (ref 27–31)
MCHC RBC AUTO-ENTMCNC: 30.7 % (ref 32–36)
MCV RBC AUTO: 105.8 FL (ref 78–100)
MONOCYTES ABSOLUTE: 0.6 K/CU MM
MONOCYTES RELATIVE PERCENT: 5 % (ref 0–4)
PDW BLD-RTO: 18.6 % (ref 11.7–14.9)
PLATELET # BLD: 415 K/CU MM (ref 140–440)
PMV BLD AUTO: 9.7 FL (ref 7.5–11.1)
POLYCHROMASIA: ABNORMAL
RBC # BLD: 4.83 M/CU MM (ref 4.2–5.4)
SEGMENTED NEUTROPHILS ABSOLUTE COUNT: 9.6 K/CU MM
SEGMENTED NEUTROPHILS RELATIVE PERCENT: 81 % (ref 36–66)
WBC # BLD: 11.8 K/CU MM (ref 4–10.5)

## 2019-12-27 PROCEDURE — 99195 PHLEBOTOMY: CPT

## 2019-12-27 PROCEDURE — 85007 BL SMEAR W/DIFF WBC COUNT: CPT

## 2019-12-27 PROCEDURE — 99211 OFF/OP EST MAY X REQ PHY/QHP: CPT

## 2019-12-27 PROCEDURE — 85027 COMPLETE CBC AUTOMATED: CPT

## 2019-12-27 NOTE — PROGRESS NOTES
Diagnosis: high H&H    Pre-Phlebotomy: /75, HR 80    Post-Phlebotomy: /72, HR 86    Volume Removed: 583 grams    Complications: none    Comments: none    LOT # [id number]  EXP: 8-7258      Bianca Graham

## 2019-12-31 ENCOUNTER — HOSPITAL ENCOUNTER (OUTPATIENT)
Age: 69
Setting detail: SPECIMEN
Discharge: HOME | End: 2019-12-31
Payer: MEDICARE

## 2019-12-31 LAB
ALBUMIN SERPL-MCNC: 4.2 GM/DL (ref 3.4–5)
ALP BLD-CCNC: 110 IU/L (ref 40–128)
ALT SERPL-CCNC: 9 U/L (ref 10–40)
ANION GAP SERPL CALCULATED.3IONS-SCNC: 10 MMOL/L (ref 4–16)
AST SERPL-CCNC: 10 IU/L (ref 15–37)
BILIRUB SERPL-MCNC: 0.6 MG/DL (ref 0–1)
BUN BLDV-MCNC: 12 MG/DL (ref 6–23)
CALCIUM SERPL-MCNC: 9.4 MG/DL (ref 8.3–10.6)
CHLORIDE BLD-SCNC: 100 MMOL/L (ref 99–110)
CO2: 27 MMOL/L (ref 21–32)
CREAT SERPL-MCNC: 1 MG/DL (ref 0.6–1.1)
GFR AFRICAN AMERICAN: >60 ML/MIN/1.73M2
GFR NON-AFRICAN AMERICAN: 55 ML/MIN/1.73M2
GLUCOSE BLD-MCNC: 114 MG/DL (ref 70–99)
LACTATE DEHYDROGENASE: 144 IU/L (ref 120–246)
POTASSIUM SERPL-SCNC: 4.7 MMOL/L (ref 3.5–5.1)
SODIUM BLD-SCNC: 137 MMOL/L (ref 135–145)
TOTAL PROTEIN: 6.4 GM/DL (ref 6.4–8.2)

## 2019-12-31 PROCEDURE — 80053 COMPREHEN METABOLIC PANEL: CPT

## 2019-12-31 PROCEDURE — 83615 LACTATE (LD) (LDH) ENZYME: CPT

## 2020-01-23 ENCOUNTER — HOSPITAL ENCOUNTER (OUTPATIENT)
Dept: INFUSION THERAPY | Age: 70
Discharge: HOME | End: 2020-01-23
Payer: MEDICARE

## 2020-01-23 LAB
ALBUMIN SERPL-MCNC: 4.4 GM/DL (ref 3.4–5)
ALP BLD-CCNC: 119 IU/L (ref 40–129)
ALT SERPL-CCNC: 8 U/L (ref 10–40)
ANION GAP SERPL CALCULATED.3IONS-SCNC: 11 MMOL/L (ref 4–16)
AST SERPL-CCNC: 10 IU/L (ref 15–37)
BASOPHILS ABSOLUTE: 0.1 K/CU MM
BASOPHILS RELATIVE PERCENT: 1 % (ref 0–1)
BILIRUB SERPL-MCNC: 0.4 MG/DL (ref 0–1)
BUN BLDV-MCNC: 10 MG/DL (ref 6–23)
CALCIUM SERPL-MCNC: 10.3 MG/DL (ref 8.3–10.6)
CHLORIDE BLD-SCNC: 99 MMOL/L (ref 99–110)
CO2: 28 MMOL/L (ref 21–32)
CREAT SERPL-MCNC: 1 MG/DL (ref 0.6–1.1)
DIFFERENTIAL TYPE: ABNORMAL
EOSINOPHILS ABSOLUTE: 0.2 K/CU MM
EOSINOPHILS RELATIVE PERCENT: 2 % (ref 0–3)
GFR AFRICAN AMERICAN: >60 ML/MIN/1.73M2
GFR NON-AFRICAN AMERICAN: 55 ML/MIN/1.73M2
GLUCOSE BLD-MCNC: 57 MG/DL (ref 70–99)
HCT VFR BLD CALC: 47.7 % (ref 37–47)
HEMOGLOBIN: 15.1 GM/DL (ref 12.5–16)
LACTATE DEHYDROGENASE: 149 IU/L (ref 120–246)
LYMPHOCYTES ABSOLUTE: 1.5 K/CU MM
LYMPHOCYTES RELATIVE PERCENT: 13 % (ref 24–44)
MCH RBC QN AUTO: 32.2 PG (ref 27–31)
MCHC RBC AUTO-ENTMCNC: 31.7 % (ref 32–36)
MCV RBC AUTO: 101.7 FL (ref 78–100)
MONOCYTES ABSOLUTE: 0.5 K/CU MM
MONOCYTES RELATIVE PERCENT: 4 % (ref 0–4)
PDW BLD-RTO: 16.6 % (ref 11.7–14.9)
PLATELET # BLD: 444 K/CU MM (ref 140–440)
PMV BLD AUTO: 10 FL (ref 7.5–11.1)
POTASSIUM SERPL-SCNC: 4.6 MMOL/L (ref 3.5–5.1)
RBC # BLD: 4.69 M/CU MM (ref 4.2–5.4)
SEGMENTED NEUTROPHILS ABSOLUTE COUNT: 9.4 K/CU MM
SEGMENTED NEUTROPHILS RELATIVE PERCENT: 80 % (ref 36–66)
SODIUM BLD-SCNC: 138 MMOL/L (ref 135–145)
TOTAL PROTEIN: 6.7 GM/DL (ref 6.4–8.2)
WBC # BLD: 11.7 K/CU MM (ref 4–10.5)

## 2020-01-23 PROCEDURE — 85025 COMPLETE CBC W/AUTO DIFF WBC: CPT

## 2020-01-23 PROCEDURE — 36415 COLL VENOUS BLD VENIPUNCTURE: CPT

## 2020-01-23 PROCEDURE — 80053 COMPREHEN METABOLIC PANEL: CPT

## 2020-01-23 PROCEDURE — 83615 LACTATE (LD) (LDH) ENZYME: CPT

## 2020-02-24 ENCOUNTER — HOSPITAL ENCOUNTER (OUTPATIENT)
Dept: INFUSION THERAPY | Age: 70
Discharge: HOME | End: 2020-02-24
Payer: MEDICARE

## 2020-02-24 LAB
ALBUMIN SERPL-MCNC: 4.3 GM/DL (ref 3.4–5)
ALP BLD-CCNC: 131 IU/L (ref 40–129)
ALT SERPL-CCNC: 10 U/L (ref 10–40)
ANION GAP SERPL CALCULATED.3IONS-SCNC: 13 MMOL/L (ref 4–16)
AST SERPL-CCNC: 11 IU/L (ref 15–37)
BASOPHILS ABSOLUTE: 0.1 K/CU MM
BASOPHILS RELATIVE PERCENT: 1.1 % (ref 0–1)
BILIRUB SERPL-MCNC: 0.6 MG/DL (ref 0–1)
BUN BLDV-MCNC: 10 MG/DL (ref 6–23)
CALCIUM SERPL-MCNC: 10.1 MG/DL (ref 8.3–10.6)
CHLORIDE BLD-SCNC: 99 MMOL/L (ref 99–110)
CO2: 27 MMOL/L (ref 21–32)
CREAT SERPL-MCNC: 1.1 MG/DL (ref 0.6–1.1)
DIFFERENTIAL TYPE: ABNORMAL
EOSINOPHILS ABSOLUTE: 0.2 K/CU MM
EOSINOPHILS RELATIVE PERCENT: 1.8 % (ref 0–3)
FERRITIN: 19 NG/ML (ref 15–150)
GFR AFRICAN AMERICAN: 59 ML/MIN/1.73M2
GFR NON-AFRICAN AMERICAN: 49 ML/MIN/1.73M2
GLUCOSE BLD-MCNC: 95 MG/DL (ref 70–99)
HCT VFR BLD CALC: 48.9 % (ref 37–47)
HEMOGLOBIN: 15.2 GM/DL (ref 12.5–16)
IRON: 49 UG/DL (ref 37–145)
LACTATE DEHYDROGENASE: 185 IU/L (ref 120–246)
LYMPHOCYTES ABSOLUTE: 1.4 K/CU MM
LYMPHOCYTES RELATIVE PERCENT: 15.8 % (ref 24–44)
MCH RBC QN AUTO: 33.4 PG (ref 27–31)
MCHC RBC AUTO-ENTMCNC: 31.1 % (ref 32–36)
MCV RBC AUTO: 107.5 FL (ref 78–100)
MONOCYTES ABSOLUTE: 0.4 K/CU MM
MONOCYTES RELATIVE PERCENT: 4.7 % (ref 0–4)
PCT TRANSFERRIN: 15 % (ref 10–44)
PDW BLD-RTO: 17 % (ref 11.7–14.9)
PLATELET # BLD: 259 K/CU MM (ref 140–440)
PMV BLD AUTO: 10 FL (ref 7.5–11.1)
POTASSIUM SERPL-SCNC: 4.6 MMOL/L (ref 3.5–5.1)
RBC # BLD: 4.55 M/CU MM (ref 4.2–5.4)
SEGMENTED NEUTROPHILS ABSOLUTE COUNT: 6.7 K/CU MM
SEGMENTED NEUTROPHILS RELATIVE PERCENT: 76.6 % (ref 36–66)
SODIUM BLD-SCNC: 139 MMOL/L (ref 135–145)
TOTAL IRON BINDING CAPACITY: 335 UG/DL (ref 250–450)
TOTAL PROTEIN: 6.7 GM/DL (ref 6.4–8.2)
UNSATURATED IRON BINDING CAPACITY: 286 UG/DL (ref 110–370)
WBC # BLD: 8.7 K/CU MM (ref 4–10.5)

## 2020-02-24 PROCEDURE — 85025 COMPLETE CBC W/AUTO DIFF WBC: CPT

## 2020-02-24 PROCEDURE — 83540 ASSAY OF IRON: CPT

## 2020-02-24 PROCEDURE — 82728 ASSAY OF FERRITIN: CPT

## 2020-02-24 PROCEDURE — 36415 COLL VENOUS BLD VENIPUNCTURE: CPT

## 2020-02-24 PROCEDURE — 83615 LACTATE (LD) (LDH) ENZYME: CPT

## 2020-02-24 PROCEDURE — 83550 IRON BINDING TEST: CPT

## 2020-02-24 PROCEDURE — 80053 COMPREHEN METABOLIC PANEL: CPT

## 2020-04-16 ENCOUNTER — HOSPITAL ENCOUNTER (OUTPATIENT)
Dept: INFUSION THERAPY | Age: 70
Discharge: HOME | End: 2020-04-16
Payer: MEDICARE

## 2020-04-16 LAB
ALBUMIN SERPL-MCNC: 4.4 GM/DL (ref 3.4–5)
ALP BLD-CCNC: 140 IU/L (ref 40–129)
ALT SERPL-CCNC: 8 U/L (ref 10–40)
ANION GAP SERPL CALCULATED.3IONS-SCNC: 11 MMOL/L (ref 4–16)
AST SERPL-CCNC: 10 IU/L (ref 15–37)
BASOPHILS ABSOLUTE: 0.1 K/CU MM
BASOPHILS RELATIVE PERCENT: 0.7 % (ref 0–1)
BILIRUB SERPL-MCNC: 0.4 MG/DL (ref 0–1)
BUN BLDV-MCNC: 9 MG/DL (ref 6–23)
CALCIUM SERPL-MCNC: 9.9 MG/DL (ref 8.3–10.6)
CHLORIDE BLD-SCNC: 104 MMOL/L (ref 99–110)
CO2: 29 MMOL/L (ref 21–32)
CREAT SERPL-MCNC: 1.1 MG/DL (ref 0.6–1.1)
DIFFERENTIAL TYPE: ABNORMAL
EOSINOPHILS ABSOLUTE: 0.3 K/CU MM
EOSINOPHILS RELATIVE PERCENT: 2.4 % (ref 0–3)
FERRITIN: 41 NG/ML (ref 15–150)
GFR AFRICAN AMERICAN: 59 ML/MIN/1.73M2
GFR NON-AFRICAN AMERICAN: 49 ML/MIN/1.73M2
GLUCOSE BLD-MCNC: 90 MG/DL (ref 70–99)
HCT VFR BLD CALC: 51.1 % (ref 37–47)
HEMOGLOBIN: 16.6 GM/DL (ref 12.5–16)
IRON: 41 UG/DL (ref 37–145)
LYMPHOCYTES ABSOLUTE: 1.3 K/CU MM
LYMPHOCYTES RELATIVE PERCENT: 11.4 % (ref 24–44)
MCH RBC QN AUTO: 36.8 PG (ref 27–31)
MCHC RBC AUTO-ENTMCNC: 32.5 % (ref 32–36)
MCV RBC AUTO: 113.3 FL (ref 78–100)
MONOCYTES ABSOLUTE: 0.4 K/CU MM
MONOCYTES RELATIVE PERCENT: 3.7 % (ref 0–4)
PCT TRANSFERRIN: 13 % (ref 10–44)
PDW BLD-RTO: 17.6 % (ref 11.7–14.9)
PLATELET # BLD: 295 K/CU MM (ref 140–440)
PMV BLD AUTO: 10.1 FL (ref 7.5–11.1)
POTASSIUM SERPL-SCNC: 4.3 MMOL/L (ref 3.5–5.1)
RBC # BLD: 4.51 M/CU MM (ref 4.2–5.4)
SEGMENTED NEUTROPHILS ABSOLUTE COUNT: 9.1 K/CU MM
SEGMENTED NEUTROPHILS RELATIVE PERCENT: 81.8 % (ref 36–66)
SODIUM BLD-SCNC: 144 MMOL/L (ref 135–145)
TOTAL IRON BINDING CAPACITY: 305 UG/DL (ref 250–450)
TOTAL PROTEIN: 6.8 GM/DL (ref 6.4–8.2)
UNSATURATED IRON BINDING CAPACITY: 264 UG/DL (ref 110–370)
WBC # BLD: ABNORMAL K/CU MM (ref 4–10.5)

## 2020-04-16 PROCEDURE — 83540 ASSAY OF IRON: CPT

## 2020-04-16 PROCEDURE — 83550 IRON BINDING TEST: CPT

## 2020-04-16 PROCEDURE — 36415 COLL VENOUS BLD VENIPUNCTURE: CPT

## 2020-04-16 PROCEDURE — 82728 ASSAY OF FERRITIN: CPT

## 2020-04-16 PROCEDURE — 85025 COMPLETE CBC W/AUTO DIFF WBC: CPT

## 2020-04-16 PROCEDURE — 80053 COMPREHEN METABOLIC PANEL: CPT

## 2020-05-28 ENCOUNTER — HOSPITAL ENCOUNTER (OUTPATIENT)
Dept: INFUSION THERAPY | Age: 70
Discharge: HOME | End: 2020-05-28
Payer: MEDICARE

## 2020-05-28 LAB
ALBUMIN SERPL-MCNC: 4.3 GM/DL (ref 3.4–5)
ALP BLD-CCNC: 142 IU/L (ref 40–128)
ALT SERPL-CCNC: 9 U/L (ref 10–40)
ANION GAP SERPL CALCULATED.3IONS-SCNC: 16 MMOL/L (ref 4–16)
AST SERPL-CCNC: 13 IU/L (ref 15–37)
BASOPHILS ABSOLUTE: 0.1 K/CU MM
BASOPHILS RELATIVE PERCENT: 0.9 % (ref 0–1)
BILIRUB SERPL-MCNC: 0.6 MG/DL (ref 0–1)
BUN BLDV-MCNC: 11 MG/DL (ref 6–23)
CALCIUM SERPL-MCNC: 10.1 MG/DL (ref 8.3–10.6)
CHLORIDE BLD-SCNC: 101 MMOL/L (ref 99–110)
CO2: 24 MMOL/L (ref 21–32)
CREAT SERPL-MCNC: 1.1 MG/DL (ref 0.6–1.1)
DIFFERENTIAL TYPE: ABNORMAL
EOSINOPHILS ABSOLUTE: 0.3 K/CU MM
EOSINOPHILS RELATIVE PERCENT: 2.9 % (ref 0–3)
GFR AFRICAN AMERICAN: 59 ML/MIN/1.73M2
GFR NON-AFRICAN AMERICAN: 49 ML/MIN/1.73M2
GLUCOSE BLD-MCNC: 115 MG/DL (ref 70–99)
HCT VFR BLD CALC: 58.7 % (ref 37–47)
HEMOGLOBIN: 18.9 GM/DL (ref 12.5–16)
LACTATE DEHYDROGENASE: 293 IU/L (ref 120–246)
LYMPHOCYTES ABSOLUTE: 1.5 K/CU MM
LYMPHOCYTES RELATIVE PERCENT: 12.5 % (ref 24–44)
MCH RBC QN AUTO: 35.8 PG (ref 27–31)
MCHC RBC AUTO-ENTMCNC: 32.2 % (ref 32–36)
MCV RBC AUTO: 111.2 FL (ref 78–100)
MONOCYTES ABSOLUTE: 0.4 K/CU MM
MONOCYTES RELATIVE PERCENT: 3.5 % (ref 0–4)
PDW BLD-RTO: 13.8 % (ref 11.7–14.9)
PLATELET # BLD: 244 K/CU MM (ref 140–440)
PMV BLD AUTO: 10.7 FL (ref 7.5–11.1)
POTASSIUM SERPL-SCNC: 5 MMOL/L (ref 3.5–5.1)
RBC # BLD: 5.28 M/CU MM (ref 4.2–5.4)
SEGMENTED NEUTROPHILS ABSOLUTE COUNT: 9.5 K/CU MM
SEGMENTED NEUTROPHILS RELATIVE PERCENT: 80.2 % (ref 36–66)
SODIUM BLD-SCNC: 141 MMOL/L (ref 135–145)
TOTAL PROTEIN: 6.6 GM/DL (ref 6.4–8.2)
WBC # BLD: 11.9 K/CU MM (ref 4–10.5)

## 2020-05-28 PROCEDURE — 83615 LACTATE (LD) (LDH) ENZYME: CPT

## 2020-05-28 PROCEDURE — 36415 COLL VENOUS BLD VENIPUNCTURE: CPT

## 2020-05-28 PROCEDURE — 99211 OFF/OP EST MAY X REQ PHY/QHP: CPT

## 2020-05-28 PROCEDURE — 85025 COMPLETE CBC W/AUTO DIFF WBC: CPT

## 2020-05-28 PROCEDURE — 80053 COMPREHEN METABOLIC PANEL: CPT

## 2020-06-01 ENCOUNTER — HOSPITAL ENCOUNTER (OUTPATIENT)
Dept: INFUSION THERAPY | Age: 70
Setting detail: INFUSION SERIES
Discharge: HOME | End: 2020-06-01
Payer: MEDICARE

## 2020-06-01 VITALS
HEART RATE: 96 BPM | OXYGEN SATURATION: 96 % | SYSTOLIC BLOOD PRESSURE: 145 MMHG | RESPIRATION RATE: 14 BRPM | DIASTOLIC BLOOD PRESSURE: 80 MMHG | TEMPERATURE: 99.1 F

## 2020-06-01 PROCEDURE — 99195 PHLEBOTOMY: CPT

## 2020-06-01 PROCEDURE — 99211 OFF/OP EST MAY X REQ PHY/QHP: CPT

## 2020-06-01 NOTE — PROGRESS NOTES
Diagnosis: Polycythemia Vera    Pre-Phlebotomy: BP BP (!) 141/104   Pulse 88   Temp 99.1 °F (37.3 °C) (Temporal)   Resp 14   SpO2 97%       Post-Phlebotomy: /85 HR 95    Volume Removed: 567 grams per K Claus JOSE    Complications: None    Comments:  Tolerated well    LOT # [id number]    EXP: 7-6771        Daphne Phillips

## 2020-06-02 LAB
POST VITAL SIGNS: NORMAL
PRE VITAL SIGNS: NORMAL
TOTAL VOLUME: NORMAL
WITNESS: NORMAL

## 2020-06-08 ENCOUNTER — HOSPITAL ENCOUNTER (OUTPATIENT)
Dept: INFUSION THERAPY | Age: 70
Setting detail: INFUSION SERIES
Discharge: HOME | End: 2020-06-08
Payer: MEDICARE

## 2020-06-08 VITALS
OXYGEN SATURATION: 95 % | DIASTOLIC BLOOD PRESSURE: 75 MMHG | HEART RATE: 94 BPM | RESPIRATION RATE: 14 BRPM | TEMPERATURE: 97.3 F | SYSTOLIC BLOOD PRESSURE: 112 MMHG

## 2020-06-08 PROCEDURE — 99211 OFF/OP EST MAY X REQ PHY/QHP: CPT

## 2020-06-08 PROCEDURE — 99195 PHLEBOTOMY: CPT

## 2020-06-08 NOTE — PROGRESS NOTES
Diagnosis: D45    Pre-Phlebotomy: /79 HR 81    Post-Phlebotomy: /71, HR 89    Volume Removed: 989 grams    Complications: NONE    Comments: TOLERATED PHLEBOTOMY WELL      LOT#[id number]    Exp: [address]

## 2020-06-08 NOTE — PROGRESS NOTES
Prior to discharge, the After Visit Summary Discharge Instructions were reviewed with the patient. She was offered a printed version of the AVS, but declined the offer. Recurrent appointment, pt tolerated infusion well. Pt discharged HOME. Pt to exit via ambulation. No orders of the defined types were placed in this encounter.

## 2020-06-09 LAB
POST VITAL SIGNS: NORMAL
PRE VITAL SIGNS: NORMAL
TOTAL VOLUME: NORMAL
WITNESS: NORMAL

## 2020-06-15 ENCOUNTER — HOSPITAL ENCOUNTER (OUTPATIENT)
Dept: INFUSION THERAPY | Age: 70
Setting detail: INFUSION SERIES
Discharge: HOME | End: 2020-06-15
Payer: MEDICARE

## 2020-06-15 VITALS
RESPIRATION RATE: 16 BRPM | HEART RATE: 78 BPM | TEMPERATURE: 97.7 F | DIASTOLIC BLOOD PRESSURE: 74 MMHG | OXYGEN SATURATION: 95 % | SYSTOLIC BLOOD PRESSURE: 119 MMHG

## 2020-06-15 LAB
POST VITAL SIGNS: NORMAL
PRE VITAL SIGNS: NORMAL
TOTAL VOLUME: NORMAL
WITNESS: NORMAL

## 2020-06-15 PROCEDURE — 99211 OFF/OP EST MAY X REQ PHY/QHP: CPT

## 2020-06-15 PROCEDURE — 99195 PHLEBOTOMY: CPT

## 2020-06-15 NOTE — PROGRESS NOTES
Diagnosis: Polycythemia Vera    Pre-Phlebotomy: BP BP (!) 149/69   Pulse 79   Temp 97.7 °F (36.5 °C) (Temporal)   Resp 18   SpO2 95%       Post-Phlebotomy: /71, HR 76    Volume Removed: 400 grams per L. Abdoulaye Iverson     Complications: None    Comments:  Tolerated well        LOT#[id number]    Exp: [address] [facility]

## 2020-06-15 NOTE — PROGRESS NOTES
Prior to discharge, the After Visit Summary Discharge Instructions were reviewed with the patient. She was offered a printed version of the AVS, but declined the offer. Recurrent appointment, pt tolerated infusion well. Pt discharged home. Pt to exit via steady gait. No orders of the defined types were placed in this encounter.

## 2020-06-22 ENCOUNTER — HOSPITAL ENCOUNTER (OUTPATIENT)
Dept: INFUSION THERAPY | Age: 70
Setting detail: INFUSION SERIES
Discharge: HOME | End: 2020-06-22
Payer: MEDICARE

## 2020-06-22 VITALS
OXYGEN SATURATION: 95 % | RESPIRATION RATE: 14 BRPM | SYSTOLIC BLOOD PRESSURE: 104 MMHG | DIASTOLIC BLOOD PRESSURE: 72 MMHG | HEART RATE: 85 BPM | TEMPERATURE: 97.3 F

## 2020-06-22 LAB
POST VITAL SIGNS: NORMAL
PRE VITAL SIGNS: NORMAL
TOTAL VOLUME: NORMAL
WITNESS: NORMAL

## 2020-06-22 PROCEDURE — 99195 PHLEBOTOMY: CPT

## 2020-06-22 PROCEDURE — 99211 OFF/OP EST MAY X REQ PHY/QHP: CPT

## 2020-06-22 NOTE — PROGRESS NOTES
Diagnosis: D45    Pre-Phlebotomy: BP /78   Pulse 80   Temp 97.3 °F (36.3 °C) (Temporal)   Resp 16   SpO2 98%       Post-Phlebotomy: /80, HR 84    Volume Removed: 752 grams    Complications: None    Comments:  Tolerated phlebotomy well        LOT#[id number]    Exp: [address]

## 2020-06-22 NOTE — PROGRESS NOTES
Tolerated Phlebotomy well. Reviewed discharge instruction, voiced understanding. Copies of AVS given. Pt discharged home. Pt to exit via ambulation  . No orders of the defined types were placed in this encounter.

## 2020-06-27 ENCOUNTER — TELEPHONE (OUTPATIENT)
Dept: INFUSION THERAPY | Age: 70
End: 2020-06-27

## 2020-06-29 ENCOUNTER — HOSPITAL ENCOUNTER (OUTPATIENT)
Dept: INFUSION THERAPY | Age: 70
Setting detail: INFUSION SERIES
End: 2020-06-29
Payer: MEDICARE

## 2020-07-02 ENCOUNTER — HOSPITAL ENCOUNTER (OUTPATIENT)
Dept: INFUSION THERAPY | Age: 70
Discharge: HOME | End: 2020-07-02
Payer: MEDICARE

## 2020-07-02 LAB
ALBUMIN SERPL-MCNC: 4.6 GM/DL (ref 3.4–5)
ALP BLD-CCNC: 123 IU/L (ref 40–129)
ALT SERPL-CCNC: 12 U/L (ref 10–40)
ANION GAP SERPL CALCULATED.3IONS-SCNC: 10 MMOL/L (ref 4–16)
AST SERPL-CCNC: 12 IU/L (ref 15–37)
BASOPHILS ABSOLUTE: 0 K/CU MM
BASOPHILS RELATIVE PERCENT: 0.5 % (ref 0–1)
BILIRUB SERPL-MCNC: 0.7 MG/DL (ref 0–1)
BUN BLDV-MCNC: 15 MG/DL (ref 6–23)
CALCIUM SERPL-MCNC: 10.5 MG/DL (ref 8.3–10.6)
CHLORIDE BLD-SCNC: 99 MMOL/L (ref 99–110)
CO2: 27 MMOL/L (ref 21–32)
CREAT SERPL-MCNC: 1.1 MG/DL (ref 0.6–1.1)
DIFFERENTIAL TYPE: ABNORMAL
EOSINOPHILS ABSOLUTE: 0.2 K/CU MM
EOSINOPHILS RELATIVE PERCENT: 3.7 % (ref 0–3)
FERRITIN: 80 NG/ML (ref 15–150)
GFR AFRICAN AMERICAN: 59 ML/MIN/1.73M2
GFR NON-AFRICAN AMERICAN: 49 ML/MIN/1.73M2
GLUCOSE BLD-MCNC: 86 MG/DL (ref 70–99)
HCT VFR BLD CALC: 41.9 % (ref 37–47)
HEMOGLOBIN: 13.7 GM/DL (ref 12.5–16)
IRON: 124 UG/DL (ref 37–145)
LACTATE DEHYDROGENASE: 155 IU/L (ref 120–246)
LYMPHOCYTES ABSOLUTE: 1.5 K/CU MM
LYMPHOCYTES RELATIVE PERCENT: 25.5 % (ref 24–44)
MCH RBC QN AUTO: 35.5 PG (ref 27–31)
MCHC RBC AUTO-ENTMCNC: 32.7 % (ref 32–36)
MCV RBC AUTO: 108.5 FL (ref 78–100)
MONOCYTES ABSOLUTE: 0.3 K/CU MM
MONOCYTES RELATIVE PERCENT: 4.2 % (ref 0–4)
PCT TRANSFERRIN: 48 % (ref 10–44)
PDW BLD-RTO: 14.5 % (ref 11.7–14.9)
PLATELET # BLD: 197 K/CU MM (ref 140–440)
PMV BLD AUTO: 9.4 FL (ref 7.5–11.1)
POTASSIUM SERPL-SCNC: 4.9 MMOL/L (ref 3.5–5.1)
RBC # BLD: 3.86 M/CU MM (ref 4.2–5.4)
SEGMENTED NEUTROPHILS ABSOLUTE COUNT: 3.9 K/CU MM
SEGMENTED NEUTROPHILS RELATIVE PERCENT: 66.1 % (ref 36–66)
SODIUM BLD-SCNC: 136 MMOL/L (ref 135–145)
TOTAL IRON BINDING CAPACITY: 259 UG/DL (ref 250–450)
TOTAL PROTEIN: 7.1 GM/DL (ref 6.4–8.2)
UNSATURATED IRON BINDING CAPACITY: 135 UG/DL (ref 110–370)
WBC # BLD: 5.9 K/CU MM (ref 4–10.5)

## 2020-07-02 PROCEDURE — 80053 COMPREHEN METABOLIC PANEL: CPT

## 2020-07-02 PROCEDURE — 85025 COMPLETE CBC W/AUTO DIFF WBC: CPT

## 2020-07-02 PROCEDURE — 83704 LIPOPROTEIN BLD QUAN PART: CPT

## 2020-07-02 PROCEDURE — 83615 LACTATE (LD) (LDH) ENZYME: CPT

## 2020-07-02 PROCEDURE — 82728 ASSAY OF FERRITIN: CPT

## 2020-07-02 PROCEDURE — 99211 OFF/OP EST MAY X REQ PHY/QHP: CPT

## 2020-07-02 PROCEDURE — 83550 IRON BINDING TEST: CPT

## 2020-07-02 PROCEDURE — 36415 COLL VENOUS BLD VENIPUNCTURE: CPT

## 2020-07-02 PROCEDURE — 83540 ASSAY OF IRON: CPT

## 2020-07-10 LAB
CHOLESTEROL, TOTAL: 190 MG/DL
HDL PARTICLE NO, NMR: 36.7 UMOL/L
HDL SIZE: 8.8 NM
HDLC SERPL-MCNC: 63 MG/DL (ref 40–59)
LARGE HDL PARTICLE, NMR: 5.5 UMOL/L
LARGE VLDL PARTICLE, NMR: 5 NMOL/L
LDL CHOLESTEROL: 90 MG/DL
LDL PARTICLE NUMBER, NMR: 1186 NMOL/L
LDL PARTICLE SIZE: 20.7 NM
LP INSULIN RESIST SCORE: ABNORMAL
SMALL LDL PARTICLE, NMR: 491 NMOL/L
TRIGL SERPL-MCNC: 185 MG/DL (ref 30–149)
VLDL SIZE: 49.7 NM

## 2020-08-19 PROBLEM — M79.601 PAIN IN RIGHT ARM: Status: ACTIVE | Noted: 2020-08-19

## 2020-08-19 PROBLEM — M25.511 PAIN IN RIGHT SHOULDER: Status: ACTIVE | Noted: 2020-08-19

## 2020-08-19 PROBLEM — M79.602 PAIN IN LEFT ARM: Status: ACTIVE | Noted: 2020-08-19

## 2020-08-19 PROBLEM — D50.9 IRON DEFICIENCY ANEMIA, UNSPECIFIED: Status: ACTIVE | Noted: 2020-08-19

## 2020-08-19 PROBLEM — E87.5 HYPERKALEMIA: Status: ACTIVE | Noted: 2020-08-19

## 2020-08-19 PROBLEM — C44.92 SQUAMOUS CELL CARCINOMA OF SKIN, UNSPECIFIED: Status: ACTIVE | Noted: 2020-08-19

## 2020-08-19 PROBLEM — K90.9 INTESTINAL MALABSORPTION, UNSPECIFIED: Status: ACTIVE | Noted: 2020-08-19

## 2020-08-19 PROBLEM — D69.6 THROMBOCYTOPENIA, UNSPECIFIED (HCC): Status: ACTIVE | Noted: 2020-08-19

## 2020-09-17 ENCOUNTER — HOSPITAL ENCOUNTER (OUTPATIENT)
Dept: INFUSION THERAPY | Age: 70
Discharge: HOME | End: 2020-09-17
Payer: MEDICARE

## 2020-09-17 ENCOUNTER — OFFICE VISIT (OUTPATIENT)
Dept: ONCOLOGY | Age: 70
End: 2020-09-17
Payer: MEDICARE

## 2020-09-17 VITALS
HEIGHT: 63 IN | BODY MASS INDEX: 30.3 KG/M2 | OXYGEN SATURATION: 98 % | TEMPERATURE: 97.3 F | RESPIRATION RATE: 16 BRPM | WEIGHT: 171 LBS | HEART RATE: 73 BPM | DIASTOLIC BLOOD PRESSURE: 84 MMHG | SYSTOLIC BLOOD PRESSURE: 124 MMHG

## 2020-09-17 DIAGNOSIS — D45 POLYCYTHEMIA VERA (HCC): ICD-10-CM

## 2020-09-17 DIAGNOSIS — D69.6 THROMBOCYTOPENIA, UNSPECIFIED (HCC): ICD-10-CM

## 2020-09-17 DIAGNOSIS — D50.9 IRON DEFICIENCY ANEMIA, UNSPECIFIED IRON DEFICIENCY ANEMIA TYPE: ICD-10-CM

## 2020-09-17 DIAGNOSIS — D68.9 COAGULOPATHY (HCC): ICD-10-CM

## 2020-09-17 DIAGNOSIS — K90.9 INTESTINAL MALABSORPTION, UNSPECIFIED TYPE: ICD-10-CM

## 2020-09-17 LAB
ALBUMIN SERPL-MCNC: 4.4 GM/DL (ref 3.4–5)
ALP BLD-CCNC: 114 IU/L (ref 40–129)
ALT SERPL-CCNC: 8 U/L (ref 10–40)
ANION GAP SERPL CALCULATED.3IONS-SCNC: 13 MMOL/L (ref 4–16)
APTT: 31.3 SECONDS (ref 25.1–37.1)
AST SERPL-CCNC: 9 IU/L (ref 15–37)
BASOPHILS ABSOLUTE: 0 K/CU MM
BASOPHILS RELATIVE PERCENT: 0.6 % (ref 0–1)
BILIRUB SERPL-MCNC: 0.5 MG/DL (ref 0–1)
BUN BLDV-MCNC: 12 MG/DL (ref 6–23)
CALCIUM SERPL-MCNC: 10.5 MG/DL (ref 8.3–10.6)
CHLORIDE BLD-SCNC: 104 MMOL/L (ref 99–110)
CO2: 25 MMOL/L (ref 21–32)
CREAT SERPL-MCNC: 1.1 MG/DL (ref 0.6–1.1)
DIFFERENTIAL TYPE: ABNORMAL
EOSINOPHILS ABSOLUTE: 0.1 K/CU MM
EOSINOPHILS RELATIVE PERCENT: 1.1 % (ref 0–3)
FERRITIN: 111 NG/ML (ref 15–150)
GFR AFRICAN AMERICAN: 59 ML/MIN/1.73M2
GFR NON-AFRICAN AMERICAN: 49 ML/MIN/1.73M2
GLUCOSE BLD-MCNC: 91 MG/DL (ref 70–99)
HCT VFR BLD CALC: 37.2 % (ref 37–47)
HEMOGLOBIN: 12.8 GM/DL (ref 12.5–16)
INR BLD: 0.91 INDEX
IRON: 82 UG/DL (ref 37–145)
LACTATE DEHYDROGENASE: 153 IU/L (ref 120–246)
LYMPHOCYTES ABSOLUTE: 1.5 K/CU MM
LYMPHOCYTES RELATIVE PERCENT: 21.4 % (ref 24–44)
MCH RBC QN AUTO: 40.1 PG (ref 27–31)
MCHC RBC AUTO-ENTMCNC: 34.4 % (ref 32–36)
MCV RBC AUTO: 116.6 FL (ref 78–100)
MONOCYTES ABSOLUTE: 0.5 K/CU MM
MONOCYTES RELATIVE PERCENT: 7.2 % (ref 0–4)
PCT TRANSFERRIN: 34 % (ref 10–44)
PDW BLD-RTO: 16.5 % (ref 11.7–14.9)
PLATELET # BLD: 213 K/CU MM (ref 140–440)
PMV BLD AUTO: 9.2 FL (ref 7.5–11.1)
POTASSIUM SERPL-SCNC: 4 MMOL/L (ref 3.5–5.1)
PROTHROMBIN TIME: 11 SECONDS (ref 11.7–14.5)
RBC # BLD: 3.19 M/CU MM (ref 4.2–5.4)
SEGMENTED NEUTROPHILS ABSOLUTE COUNT: 4.9 K/CU MM
SEGMENTED NEUTROPHILS RELATIVE PERCENT: 69.7 % (ref 36–66)
SODIUM BLD-SCNC: 142 MMOL/L (ref 135–145)
TOTAL IRON BINDING CAPACITY: 238 UG/DL (ref 250–450)
TOTAL PROTEIN: 6.6 GM/DL (ref 6.4–8.2)
UNSATURATED IRON BINDING CAPACITY: 156 UG/DL (ref 110–370)
WBC # BLD: 7 K/CU MM (ref 4–10.5)

## 2020-09-17 PROCEDURE — 85730 THROMBOPLASTIN TIME PARTIAL: CPT

## 2020-09-17 PROCEDURE — 83540 ASSAY OF IRON: CPT

## 2020-09-17 PROCEDURE — 83615 LACTATE (LD) (LDH) ENZYME: CPT

## 2020-09-17 PROCEDURE — 1123F ACP DISCUSS/DSCN MKR DOCD: CPT | Performed by: INTERNAL MEDICINE

## 2020-09-17 PROCEDURE — G8417 CALC BMI ABV UP PARAM F/U: HCPCS | Performed by: INTERNAL MEDICINE

## 2020-09-17 PROCEDURE — 85610 PROTHROMBIN TIME: CPT

## 2020-09-17 PROCEDURE — G8427 DOCREV CUR MEDS BY ELIG CLIN: HCPCS | Performed by: INTERNAL MEDICINE

## 2020-09-17 PROCEDURE — 99211 OFF/OP EST MAY X REQ PHY/QHP: CPT

## 2020-09-17 PROCEDURE — G8400 PT W/DXA NO RESULTS DOC: HCPCS | Performed by: INTERNAL MEDICINE

## 2020-09-17 PROCEDURE — 83550 IRON BINDING TEST: CPT

## 2020-09-17 PROCEDURE — 36415 COLL VENOUS BLD VENIPUNCTURE: CPT

## 2020-09-17 PROCEDURE — 1090F PRES/ABSN URINE INCON ASSESS: CPT | Performed by: INTERNAL MEDICINE

## 2020-09-17 PROCEDURE — 80053 COMPREHEN METABOLIC PANEL: CPT

## 2020-09-17 PROCEDURE — 1036F TOBACCO NON-USER: CPT | Performed by: INTERNAL MEDICINE

## 2020-09-17 PROCEDURE — 85025 COMPLETE CBC W/AUTO DIFF WBC: CPT

## 2020-09-17 PROCEDURE — 4040F PNEUMOC VAC/ADMIN/RCVD: CPT | Performed by: INTERNAL MEDICINE

## 2020-09-17 PROCEDURE — 3017F COLORECTAL CA SCREEN DOC REV: CPT | Performed by: INTERNAL MEDICINE

## 2020-09-17 PROCEDURE — 82728 ASSAY OF FERRITIN: CPT

## 2020-09-17 PROCEDURE — 99214 OFFICE O/P EST MOD 30 MIN: CPT | Performed by: INTERNAL MEDICINE

## 2020-09-17 ASSESSMENT — PATIENT HEALTH QUESTIONNAIRE - PHQ9
1. LITTLE INTEREST OR PLEASURE IN DOING THINGS: 0
SUM OF ALL RESPONSES TO PHQ9 QUESTIONS 1 & 2: 0
SUM OF ALL RESPONSES TO PHQ QUESTIONS 1-9: 0
SUM OF ALL RESPONSES TO PHQ QUESTIONS 1-9: 0
2. FEELING DOWN, DEPRESSED OR HOPELESS: 0

## 2020-09-17 NOTE — PROGRESS NOTES
Patient Name: Megan Mares  Patient : 1950  Patient MRN: [id number]     Primary Oncologist: Jun Szymanski MD  PCP: Dr Summer Sunshine        Date of Service: 2020      Chief Complaint:   Chief Complaint   Patient presents with    1 Month Follow-Up        Active Problem list  1. Polycythemia vera ([address]    2. Squamous cell carcinoma of skin, unspecified    3. Iron deficiency anemia, unspecified iron deficiency anemia type    4. Intestinal malabsorption, unspecified type    5. Thrombocytopenia, unspecified (HCC)           HPI:        Referred in 2016 for polycythemia, CBC with wbc 13.6, Hb 14.4 hct 47.8, plt 513K  16 Jak2 postive  Was started on Hydrea as h/o MI needing CABG and also h/o TIA x 2, dose adjusted as needed. Then with SHAILA, had EGD, Cscope and VCE in may 2016  Cscope with diverticulosis and nonbleeding internal hemorrhoids  EGD with erythematous stomach but biopsy neg for h.pylori or malignancy  Capsule endoscopy with apthous ulcers in the small bowel. 18: Hydrea 1000mg OD    2018: Ct abdomen and pelvis:Impression  No renal or ureteral stone. No bladder stone. Distal left ureter does not completely opacify but otherwise appears  unremarkable. Otherwise no filling defect within the renal or ureteral  collecting systems. Mild irregularity along the posterior bladder wall which may be related to  ureterovesical junction bilaterally. Recommend further evaluation with  cystoscopy given hematuria.       19: CBC with wbc 11, Hb 20.2, hct 60, mcv 105, plt 171K  Creatinine 1.06, alk ze1250  ldh 296    3/12/19:CBC with wbc 11.9, Hb 15.6, hct 49.2, mcv 106, plt 384K  Ferritin 9  sats 7    2020 LDH of 149 CMP with sodium 138 potassium 4.6 glucose of 37 creatinine 1 calcium 10.3 ALT 8 AST 10 alk phos 115 CBC with WBC 11.7 hemoglobin 13.1 hematocrit 47.7 MCV of 101.7 platelets of 867      2020 EGD with Possible barrettes but biopsy negative     PMH:   Polycythemia vera  Hypothyroidism  CAD  Lower back pain  HTN  HLP    PSH:  left rotator cuff repair 2017  Laminectomies x 2  cholecystectomy  hernia repair  removal of renal ca;culi  Total thyroidectomy  CABG  hemorrhoidectomy x 2  Hysterectomy  Lumpectomy left breast, non malignant  Eye lid surgery    Allergies: None    SH: Grand son lives with her. 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 FH: Brother has leukemia ?type. No other blood dyscrasias    Interval History  9/17/2020:Arrived alone to the clinic today. Reported allergies and she has been working outside cleaning yard, burning wood, tearing the fence. Dry Cough sec to allergies. No fever. Denied any chest pain, increased shortness of breath, palpitations or dizziness. Recently was followed by cardiology, had tests done. 5 years since she had the heart attack. Denied  night sweats. Denied any dysphagia or odynophagia. Denied any bleeding or any rash. Reported that she feels tired at times. Reported that she is on once a day iron supplements. Has been on Hydrea 1000 mg every day. Bruise easily. Is on ASA 81 mg daily Skin cancer healed well, has appt with dermatology. Review of Systems   Per interval history; otherwise 10 point ROS is negative              Vital Signs:  /84 (Site: Right Upper Arm, Position: Sitting, Cuff Size: Large Adult)   Pulse 73   Temp 97.3 °F (36.3 °C) (Infrared)   Resp 16   Ht 5' 3\" (1.6 m)   Wt 171 lb (77.6 kg)   SpO2 98%   BMI 30.29 kg/m²     Physical Exam:    CONSTITUTIONAL: awake, alert, overweight/obese   EYES:No palor or icterus   ENT:NCAT   NECK: No JVD   HEMATOLOGIC/LYMPHATIC: no cervical, supraclavicular or axillary lymphadenopathy   LUNGS: CTAB, no wheeze   CARDIOVASCULAR:Sternal scar healed well. s1s2 rrr SM++  ABDOMEN: soft ntnd bs pos, no hsm  NEUROLOGIC: GI   SKIN: AKs and echymosis, Scalp scar well healed.   EXTREMITIES: no LE edema bilaterally      Labs:    Hematology:  Lab Results   Component Value Date    WBC 5.9 07/02/2020    RBC 3.86 (L) 07/02/2020    HGB 13.7 07/02/2020    HCT 41.9 07/02/2020    .5 (H) 07/02/2020    MCH 35.5 (H) 07/02/2020    MCHC 32.7 07/02/2020    RDW 14.5 07/02/2020     07/02/2020    MPV 9.4 07/02/2020    BANDSPCT 3 (L) 10/11/2019    SEGSPCT 66.1 (H) 07/02/2020    EOSRELPCT 3.7 (H) 07/02/2020    BASOPCT 0.5 07/02/2020    LYMPHOPCT 25.5 07/02/2020    MONOPCT 4.2 (H) 07/02/2020    BANDABS 0.38 10/11/2019    SEGSABS 3.9 07/02/2020    EOSABS 0.2 07/02/2020    BASOSABS 0.0 07/02/2020    LYMPHSABS 1.5 07/02/2020    MONOSABS 0.3 07/02/2020    DIFFTYPE AUTOMATED DIFFERENTIAL 07/02/2020    ANISOCYTOSIS 1+ 12/27/2019    POLYCHROM 1+ 12/27/2019    WBCMORP FEW  ATYPICAL LYMPH(S) NOTED   12/23/2019    PLTM RARE LARGE PLATELETS SEEN ON SMEAR 10/11/2019     No results found for: ESR    Chemistry:  Lab Results   Component Value Date     07/02/2020    K 4.9 07/02/2020    CL 99 07/02/2020    CO2 27 07/02/2020    BUN 15 07/02/2020    CREATININE 1.1 07/02/2020    GLUCOSE 86 07/02/2020    CALCIUM 10.5 07/02/2020    PROT 7.1 07/02/2020    LABALBU 4.6 07/02/2020    BILITOT 0.7 07/02/2020    ALKPHOS 123 07/02/2020    AST 12 (L) 07/02/2020    ALT 12 07/02/2020    LABGLOM 49 (L) 07/02/2020    GFRAA 59 (L) 07/02/2020    MG 2.0 09/26/2015    POCCA 1.31 09/23/2015    POCGLU 104 (H) 09/25/2015     Lab Results   Component Value Date     07/02/2020     No components found for: LD  Lab Results   Component Value Date    TSHHS 2.720 05/06/2019    T4FREE 1.20 05/06/2019    FT3 2.8 11/08/2016       Immunology:  Lab Results   Component Value Date    PROT 7.1 07/02/2020     No results found for: MICAELA Escoto  No results found for: B2M    Coagulation Panel:  Lab Results   Component Value Date    PROTIME 15.2 09/23/2015    INR 1.34 09/23/2015    APTT 32.4 09/23/2015       Anemia Panel:  Lab Results   Component Value Date    FQTCVXYT00 847.3 07/12/2019    FOLATE 3.2 07/12/2019       Tumor Markers:  No results found for: , CEA, , LABCA2, PSA      Imaging: Reviewed     Pathology:Reviewed     Observations:  Performance Status: ECOG 0  Depression Status: PHQ-9 Total Score: 0 (9/17/2020  9:05 AM)          Assessment & Plan:  PVera/Low ferritin: Low Erythropoetin and JAK2 + suggestive of polycythemia vera. Started Hydrea jan 2018, as H/O thrombosis(needing CABG) and also Possible TIA. leukocytosis in may 2019 was most probably sec to steroids, Flow at that point with left shift but otherwise normal.  Note declining platelet count and also HCT at goal, decreased hydrea dose to 1000mg daily. and recommend phlebotomy for goal hct <46%. Continue ASA and adequate hydration. 9/14/20 CBC pending, adjust hydrea dose and phlebotomy per CBC results    Thrombocytopenia: Most probably sec to hydrea, phlebotomy. R/O nutritional def. CBC pending    Iron indices and ferritin were suggestive of jeff. Note rectal bleed most probably sec to hemorrhoids, phlebotomy. Colonoscopy three years ago and recent EGD results normal. On oral iron once daily, continue. Increased alk Po4: H/o cholecystectomy    Skin cancer: Is followed by dermatology    Lipoma on the neck: Defer intervention per PCP    Easy bruising: PT/PTT pending    Screening: Is uptodate on mammogram and colonoscopies. Dexa scan 2019 normal. No further pap smears. RTC November 2020 or earlier if new Sx    Discussed the above findings and plan with the pt. She verbalizes understanding. Answered all questions. Discussed healthy lifestyle including exercise and weight loss. Recommend follow up with PCP and other specialists. Please do not hesitate to call if you need any further information. I have recommended that the patient follow CDC guidelines for prevention of COVID-19 infection.     [address]           Electronically signed by Andres Richards MD on 9/17/20 at 9:33 AM EDT

## 2020-09-17 NOTE — PROGRESS NOTES
MA Rooming Questions  Patient: Layne Etienne  MRN: [id number]    Date: 9/17/2020        1. Do you have any new issues?   no         2. Do you need any refills on medications?    no    3. Have you had any imaging done since your last visit?   no    4. Have you been hospitalized or seen in the emergency room since your last visit here?   no    5. Did the patient have a depression screening completed today?  Yes    PHQ-9 Total Score: 0 (9/17/2020  9:05 AM)       PHQ-9 Given to (if applicable):               PHQ-9 Score (if applicable):                     [] Positive     []  Negative              Does question #9 need addressed (if applicable)                     [] Yes    []  No               Naun Bunn MA

## 2020-09-18 ENCOUNTER — TELEPHONE (OUTPATIENT)
Dept: ONCOLOGY | Age: 70
End: 2020-09-18

## 2020-11-18 ENCOUNTER — HOSPITAL ENCOUNTER (OUTPATIENT)
Dept: INFUSION THERAPY | Age: 70
Discharge: HOME | End: 2020-11-18
Payer: MEDICARE

## 2020-11-18 DIAGNOSIS — D45 POLYCYTHEMIA VERA (HCC): ICD-10-CM

## 2020-11-18 DIAGNOSIS — K90.9 INTESTINAL MALABSORPTION, UNSPECIFIED TYPE: ICD-10-CM

## 2020-11-18 DIAGNOSIS — D50.9 IRON DEFICIENCY ANEMIA, UNSPECIFIED IRON DEFICIENCY ANEMIA TYPE: ICD-10-CM

## 2020-11-18 DIAGNOSIS — D69.6 THROMBOCYTOPENIA, UNSPECIFIED (HCC): ICD-10-CM

## 2020-11-18 LAB
ALBUMIN SERPL-MCNC: 4.2 GM/DL (ref 3.4–5)
ALP BLD-CCNC: 113 IU/L (ref 40–129)
ALT SERPL-CCNC: 8 U/L (ref 10–40)
ANION GAP SERPL CALCULATED.3IONS-SCNC: 11 MMOL/L (ref 4–16)
AST SERPL-CCNC: 12 IU/L (ref 15–37)
BASOPHILS ABSOLUTE: 0.1 K/CU MM
BASOPHILS RELATIVE PERCENT: 0.7 % (ref 0–1)
BILIRUB SERPL-MCNC: 0.4 MG/DL (ref 0–1)
BUN BLDV-MCNC: 10 MG/DL (ref 6–23)
CALCIUM SERPL-MCNC: 10.3 MG/DL (ref 8.3–10.6)
CHLORIDE BLD-SCNC: 102 MMOL/L (ref 99–110)
CO2: 28 MMOL/L (ref 21–32)
CREAT SERPL-MCNC: 1 MG/DL (ref 0.6–1.1)
DIFFERENTIAL TYPE: ABNORMAL
EOSINOPHILS ABSOLUTE: 0.4 K/CU MM
EOSINOPHILS RELATIVE PERCENT: 3.3 % (ref 0–3)
FERRITIN: 15 NG/ML (ref 15–150)
FOLATE: 6.3 NG/ML (ref 3.1–17.5)
GFR AFRICAN AMERICAN: >60 ML/MIN/1.73M2
GFR NON-AFRICAN AMERICAN: 55 ML/MIN/1.73M2
GLUCOSE BLD-MCNC: 106 MG/DL (ref 70–99)
HCT VFR BLD CALC: 48.8 % (ref 37–47)
HEMOGLOBIN: 15.3 GM/DL (ref 12.5–16)
IRON: 23 UG/DL (ref 37–145)
LACTATE DEHYDROGENASE: 173 IU/L (ref 120–246)
LYMPHOCYTES ABSOLUTE: 1.8 K/CU MM
LYMPHOCYTES RELATIVE PERCENT: 17.2 % (ref 24–44)
MCH RBC QN AUTO: 31.5 PG (ref 27–31)
MCHC RBC AUTO-ENTMCNC: 31.4 % (ref 32–36)
MCV RBC AUTO: 100.4 FL (ref 78–100)
MONOCYTES ABSOLUTE: 0.4 K/CU MM
MONOCYTES RELATIVE PERCENT: 4 % (ref 0–4)
PCT TRANSFERRIN: 7 % (ref 10–44)
PDW BLD-RTO: 18 % (ref 11.7–14.9)
PLATELET # BLD: 305 K/CU MM (ref 140–440)
PMV BLD AUTO: 10.6 FL (ref 7.5–11.1)
POTASSIUM SERPL-SCNC: 4 MMOL/L (ref 3.5–5.1)
RBC # BLD: 4.86 M/CU MM (ref 4.2–5.4)
SEGMENTED NEUTROPHILS ABSOLUTE COUNT: 7.9 K/CU MM
SEGMENTED NEUTROPHILS RELATIVE PERCENT: 74.8 % (ref 36–66)
SODIUM BLD-SCNC: 141 MMOL/L (ref 135–145)
TOTAL IRON BINDING CAPACITY: 309 UG/DL (ref 250–450)
TOTAL PROTEIN: 6.5 GM/DL (ref 6.4–8.2)
UNSATURATED IRON BINDING CAPACITY: 286 UG/DL (ref 110–370)
VITAMIN B-12: 479.8 PG/ML (ref 211–911)
WBC # BLD: 10.6 K/CU MM (ref 4–10.5)

## 2020-11-18 PROCEDURE — 82746 ASSAY OF FOLIC ACID SERUM: CPT

## 2020-11-18 PROCEDURE — 82728 ASSAY OF FERRITIN: CPT

## 2020-11-18 PROCEDURE — 82607 VITAMIN B-12: CPT

## 2020-11-18 PROCEDURE — 83615 LACTATE (LD) (LDH) ENZYME: CPT

## 2020-11-18 PROCEDURE — 83550 IRON BINDING TEST: CPT

## 2020-11-18 PROCEDURE — 83540 ASSAY OF IRON: CPT

## 2020-11-18 PROCEDURE — 80053 COMPREHEN METABOLIC PANEL: CPT

## 2020-11-18 PROCEDURE — 85025 COMPLETE CBC W/AUTO DIFF WBC: CPT

## 2020-11-18 PROCEDURE — 36415 COLL VENOUS BLD VENIPUNCTURE: CPT

## 2020-11-23 ENCOUNTER — OFFICE VISIT (OUTPATIENT)
Dept: ONCOLOGY | Age: 70
End: 2020-11-23
Payer: MEDICARE

## 2020-11-23 ENCOUNTER — HOSPITAL ENCOUNTER (OUTPATIENT)
Dept: INFUSION THERAPY | Age: 70
Discharge: HOME | End: 2020-11-23
Payer: MEDICARE

## 2020-11-23 VITALS
HEIGHT: 63 IN | RESPIRATION RATE: 16 BRPM | DIASTOLIC BLOOD PRESSURE: 80 MMHG | WEIGHT: 166 LBS | BODY MASS INDEX: 29.41 KG/M2 | OXYGEN SATURATION: 98 % | HEART RATE: 60 BPM | SYSTOLIC BLOOD PRESSURE: 148 MMHG | TEMPERATURE: 96.5 F

## 2020-11-23 PROCEDURE — G8417 CALC BMI ABV UP PARAM F/U: HCPCS | Performed by: INTERNAL MEDICINE

## 2020-11-23 PROCEDURE — G8484 FLU IMMUNIZE NO ADMIN: HCPCS | Performed by: INTERNAL MEDICINE

## 2020-11-23 PROCEDURE — 1090F PRES/ABSN URINE INCON ASSESS: CPT | Performed by: INTERNAL MEDICINE

## 2020-11-23 PROCEDURE — G8400 PT W/DXA NO RESULTS DOC: HCPCS | Performed by: INTERNAL MEDICINE

## 2020-11-23 PROCEDURE — 1036F TOBACCO NON-USER: CPT | Performed by: INTERNAL MEDICINE

## 2020-11-23 PROCEDURE — G8427 DOCREV CUR MEDS BY ELIG CLIN: HCPCS | Performed by: INTERNAL MEDICINE

## 2020-11-23 PROCEDURE — 1123F ACP DISCUSS/DSCN MKR DOCD: CPT | Performed by: INTERNAL MEDICINE

## 2020-11-23 PROCEDURE — 99214 OFFICE O/P EST MOD 30 MIN: CPT | Performed by: INTERNAL MEDICINE

## 2020-11-23 PROCEDURE — 99211 OFF/OP EST MAY X REQ PHY/QHP: CPT

## 2020-11-23 PROCEDURE — 3017F COLORECTAL CA SCREEN DOC REV: CPT | Performed by: INTERNAL MEDICINE

## 2020-11-23 PROCEDURE — 4040F PNEUMOC VAC/ADMIN/RCVD: CPT | Performed by: INTERNAL MEDICINE

## 2020-11-23 ASSESSMENT — PATIENT HEALTH QUESTIONNAIRE - PHQ9
1. LITTLE INTEREST OR PLEASURE IN DOING THINGS: 0
2. FEELING DOWN, DEPRESSED OR HOPELESS: 0
SUM OF ALL RESPONSES TO PHQ9 QUESTIONS 1 & 2: 0
SUM OF ALL RESPONSES TO PHQ QUESTIONS 1-9: 0

## 2020-11-23 NOTE — PROGRESS NOTES
Patient Name: Melissa Gibbs  Patient : 1950  Patient MRN: [id number]     Primary Oncologist: Nathan Macdonald MD  PCP: Dr Temi Paul        Date of Service: 2020      Chief Complaint:   Chief Complaint   Patient presents with    Discuss Labs        Active Problem list  1. Squamous cell carcinoma of skin, unspecified    2. Intestinal malabsorption, unspecified type    3. Iron deficiency anemia, unspecified iron deficiency anemia type    4. Polycythemia vera ([address]           HPI:        Referred in 2016 for polycythemia, CBC with wbc 13.6, Hb 14.4 hct 47.8, plt 513K  16 Jak2 postive  Was started on Hydrea as h/o MI needing CABG and also h/o TIA x 2, dose adjusted as needed. Then with SHAILA, had EGD, Cscope and VCE in may 2016  Cscope with diverticulosis and nonbleeding internal hemorrhoids  EGD with erythematous stomach but biopsy neg for h.pylori or malignancy  Capsule endoscopy with apthous ulcers in the small bowel. 18: Hydrea 1000mg OD    2018: Ct abdomen and pelvis:Impression  No renal or ureteral stone. No bladder stone. Distal left ureter does not completely opacify but otherwise appears  unremarkable. Otherwise no filling defect within the renal or ureteral  collecting systems. Mild irregularity along the posterior bladder wall which may be related to  ureterovesical junction bilaterally. Recommend further evaluation with  cystoscopy given hematuria.       19: CBC with wbc 11, Hb 20.2, hct 60, mcv 105, plt 171K  Creatinine 1.06, alk [id number]  ldh 296    3/12/19:CBC with wbc 11.9, Hb 15.6, hct 49.2, mcv 106, plt 384K  Ferritin 9  sats 7    2020 LDH of 149 CMP with sodium 138 potassium 4.6 glucose of 37 creatinine 1 calcium 10.3 ALT 8 AST 10 alk phos 115 CBC with WBC 11.7 hemoglobin 13.1 hematocrit 47.7 MCV of 101.7 platelets of 534      2020 EGD with Possible barrettes but biopsy negative     PMH:   Polycythemia vera  Hypothyroidism  CAD  Lower back pain  HTN  HLP    PSH:  left rotator cuff repair 2017  Laminectomies x 2  cholecystectomy  hernia repair  removal of renal ca;culi  Total thyroidectomy  CABG  hemorrhoidectomy x 2  Hysterectomy  Lumpectomy left breast, non malignant  Eye lid surgery    Allergies: None    SH: Grand son lives with her. 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 FH: Brother has leukemia ?type. No other blood dyscrasias    Interval History  11/23/2020:Arrived alone to the clinic today. Reported that overall she feels fine . Denied any fatigue. Reported that her son has sold his house and moving into her house. And she is moving into his house. Did not report any fatigue. Did not report any fever, night sweats or any weight loss. Reported that she is on Hydrea 500 mg 2 tablets alternating with 3 tablets. Also on ferrous gluconate 1 tablet/day. Denied any overt bleeding. Denied any chest pain, increased shortness of breath, palpitations or dizziness. Denied any abdominal pain, nausea, vomiting, diarrhea, constipation. Denied any rash. Review of Systems   Per interval history; otherwise 10 point ROS is negative              Vital Signs:  BP (!) 148/80 (Site: Left Upper Arm, Position: Sitting, Cuff Size: Large Adult)   Pulse 60   Temp 96.5 °F (35.8 °C) (Infrared)   Resp 16   Ht 5' 3\" (1.6 m)   Wt 166 lb (75.3 kg)   SpO2 98%   BMI 29.41 kg/m²     Physical Exam:    CONSTITUTIONAL: awake, alert, overweight/obese   EYES:No palor or icterus   ENT:NCAT   NECK: No JVD   HEMATOLOGIC/LYMPHATIC: no cervical, supraclavicular or axillary lymphadenopathy   LUNGS: CTAB, no wheeze   CARDIOVASCULAR:Sternal scar healed well. s1s2 rrr SM++  ABDOMEN: soft ntnd bs pos, no hsm  NEUROLOGIC: GI   SKIN: AKs and echymosis, Scalp scar well healed.   EXTREMITIES: no LE edema bilaterally      Labs:    Hematology:  Lab Results   Component Value Date    WBC 10.6 (H) 11/18/2020    RBC 4.86 11/18/2020    HGB 15.3 11/18/2020    HCT 48.8 (H) 11/18/2020    .4 (H) 11/18/2020    MCH 31.5 (H) 11/18/2020    MCHC 31.4 (L) 11/18/2020    RDW 18.0 (H) 11/18/2020     11/18/2020    MPV 10.6 11/18/2020    BANDSPCT 3 (L) 10/11/2019    SEGSPCT 74.8 (H) 11/18/2020    EOSRELPCT 3.3 (H) 11/18/2020    BASOPCT 0.7 11/18/2020    LYMPHOPCT 17.2 (L) 11/18/2020    MONOPCT 4.0 11/18/2020    BANDABS 0.38 10/11/2019    SEGSABS 7.9 11/18/2020    EOSABS 0.4 11/18/2020    BASOSABS 0.1 11/18/2020    LYMPHSABS 1.8 11/18/2020    MONOSABS 0.4 11/18/2020    DIFFTYPE AUTOMATED DIFFERENTIAL 11/18/2020    ANISOCYTOSIS 1+ 12/27/2019    POLYCHROM 1+ 12/27/2019    WBCMORP FEW  ATYPICAL LYMPH(S) NOTED   12/23/2019    PLTM RARE LARGE PLATELETS SEEN ON SMEAR 10/11/2019     No results found for: ESR    Chemistry:  Lab Results   Component Value Date     11/18/2020    K 4.0 11/18/2020     11/18/2020    CO2 28 11/18/2020    BUN 10 11/18/2020    CREATININE 1.0 11/18/2020    GLUCOSE 106 (H) 11/18/2020    CALCIUM 10.3 11/18/2020    PROT 6.5 11/18/2020    LABALBU 4.2 11/18/2020    BILITOT 0.4 11/18/2020    ALKPHOS 113 11/18/2020    AST 12 (L) 11/18/2020    ALT 8 (L) 11/18/2020    LABGLOM 55 (L) 11/18/2020    GFRAA >60 11/18/2020    MG 2.0 09/26/2015    POCCA 1.31 09/23/2015    POCGLU 104 (H) 09/25/2015     Lab Results   Component Value Date     11/18/2020     No components found for: LD  Lab Results   Component Value Date    TSHHS 2.720 05/06/2019    T4FREE 1.20 05/06/2019    FT3 2.8 11/08/2016       Immunology:  Lab Results   Component Value Date    PROT 6.5 11/18/2020     No results found for: MICAELA Duarte  No results found for: B2M    Coagulation Panel:  Lab Results   Component Value Date    PROTIME 11.0 (L) 09/17/2020    INR 0.91 09/17/2020    APTT 31.3 09/17/2020       Anemia Panel:  Lab Results   Component Value Date    [id number] 479.8 11/18/2020    FOLATE 6.3 11/18/2020       Tumor Markers:  No

## 2020-11-23 NOTE — PROGRESS NOTES
MA Rooming Questions  Patient: Gaston Medina  MRN: [id number]    Date: 11/23/2020        1. Do you have any new issues?   no         2. Do you need any refills on medications?    no    3. Have you had any imaging done since your last visit?   no    4. Have you been hospitalized or seen in the emergency room since your last visit here?   no    5. Did the patient have a depression screening completed today?  Yes    PHQ-9 Total Score: 0 (11/23/2020  3:47 PM)       PHQ-9 Given to (if applicable):               PHQ-9 Score (if applicable):                     [] Positive     []  Negative              Does question #9 need addressed (if applicable)                     [] Yes    []  No               Vivian Guaman MA

## 2021-03-01 ENCOUNTER — CLINICAL DOCUMENTATION (OUTPATIENT)
Dept: ONCOLOGY | Age: 71
End: 2021-03-01

## 2021-03-01 ENCOUNTER — HOSPITAL ENCOUNTER (OUTPATIENT)
Dept: INFUSION THERAPY | Age: 71
Discharge: HOME | End: 2021-03-01
Payer: MEDICARE

## 2021-03-01 ENCOUNTER — TELEPHONE (OUTPATIENT)
Dept: INFUSION THERAPY | Age: 71
End: 2021-03-01

## 2021-03-01 DIAGNOSIS — K90.9 INTESTINAL MALABSORPTION, UNSPECIFIED TYPE: ICD-10-CM

## 2021-03-01 DIAGNOSIS — D45 POLYCYTHEMIA VERA (HCC): ICD-10-CM

## 2021-03-01 DIAGNOSIS — D45 POLYCYTHEMIA VERA (HCC): Primary | ICD-10-CM

## 2021-03-01 DIAGNOSIS — D50.9 IRON DEFICIENCY ANEMIA, UNSPECIFIED IRON DEFICIENCY ANEMIA TYPE: ICD-10-CM

## 2021-03-01 LAB
ALBUMIN SERPL-MCNC: 4 GM/DL (ref 3.4–5)
ALP BLD-CCNC: 130 IU/L (ref 40–129)
ALT SERPL-CCNC: 11 U/L (ref 10–40)
ANION GAP SERPL CALCULATED.3IONS-SCNC: 6 MMOL/L (ref 4–16)
AST SERPL-CCNC: 14 IU/L (ref 15–37)
BASOPHILS ABSOLUTE: 0.3 K/CU MM
BASOPHILS RELATIVE PERCENT: 1.3 % (ref 0–1)
BILIRUB SERPL-MCNC: 0.6 MG/DL (ref 0–1)
BUN BLDV-MCNC: 10 MG/DL (ref 6–23)
CALCIUM SERPL-MCNC: 10.6 MG/DL (ref 8.3–10.6)
CHLORIDE BLD-SCNC: 104 MMOL/L (ref 99–110)
CO2: 29 MMOL/L (ref 21–32)
CREAT SERPL-MCNC: 1.1 MG/DL (ref 0.6–1.1)
DIFFERENTIAL TYPE: ABNORMAL
EOSINOPHILS ABSOLUTE: 0.9 K/CU MM
EOSINOPHILS RELATIVE PERCENT: 4.4 % (ref 0–3)
FERRITIN: 27 NG/ML (ref 15–150)
GFR AFRICAN AMERICAN: 59 ML/MIN/1.73M2
GFR NON-AFRICAN AMERICAN: 49 ML/MIN/1.73M2
GLUCOSE BLD-MCNC: 83 MG/DL (ref 70–99)
HCT VFR BLD CALC: 61.5 % (ref 37–47)
HEMOGLOBIN: 18.5 GM/DL (ref 12.5–16)
IRON: 31 UG/DL (ref 37–145)
LACTATE DEHYDROGENASE: 236 IU/L (ref 120–246)
LYMPHOCYTES ABSOLUTE: 2.2 K/CU MM
LYMPHOCYTES RELATIVE PERCENT: 10.2 % (ref 24–44)
MCH RBC QN AUTO: 23.5 PG (ref 27–31)
MCHC RBC AUTO-ENTMCNC: 30.1 % (ref 32–36)
MCV RBC AUTO: 78.1 FL (ref 78–100)
MONOCYTES ABSOLUTE: 0.5 K/CU MM
MONOCYTES RELATIVE PERCENT: 2.3 % (ref 0–4)
PCT TRANSFERRIN: 10 % (ref 10–44)
PDW BLD-RTO: 21.1 % (ref 11.7–14.9)
PLATELET # BLD: 439 K/CU MM (ref 140–440)
PMV BLD AUTO: 10.4 FL (ref 7.5–11.1)
POTASSIUM SERPL-SCNC: 5.2 MMOL/L (ref 3.5–5.1)
RBC # BLD: 7.87 M/CU MM (ref 4.2–5.4)
SEGMENTED NEUTROPHILS ABSOLUTE COUNT: 17.6 K/CU MM
SEGMENTED NEUTROPHILS RELATIVE PERCENT: 81.8 % (ref 36–66)
SODIUM BLD-SCNC: 139 MMOL/L (ref 135–145)
TOTAL IRON BINDING CAPACITY: 322 UG/DL (ref 250–450)
TOTAL PROTEIN: 6.4 GM/DL (ref 6.4–8.2)
UNSATURATED IRON BINDING CAPACITY: 291 UG/DL (ref 110–370)
WBC # BLD: 21.5 K/CU MM (ref 4–10.5)

## 2021-03-01 PROCEDURE — 83550 IRON BINDING TEST: CPT

## 2021-03-01 PROCEDURE — 83540 ASSAY OF IRON: CPT

## 2021-03-01 PROCEDURE — 82728 ASSAY OF FERRITIN: CPT

## 2021-03-01 PROCEDURE — 80053 COMPREHEN METABOLIC PANEL: CPT

## 2021-03-01 PROCEDURE — 85025 COMPLETE CBC W/AUTO DIFF WBC: CPT

## 2021-03-01 PROCEDURE — 83615 LACTATE (LD) (LDH) ENZYME: CPT

## 2021-03-01 PROCEDURE — 36415 COLL VENOUS BLD VENIPUNCTURE: CPT

## 2021-03-02 ENCOUNTER — TELEPHONE (OUTPATIENT)
Dept: ONCOLOGY | Age: 71
End: 2021-03-02

## 2021-03-02 NOTE — TELEPHONE ENCOUNTER
Spoke with pt and verified that Dr Michelle Paetl ordered 2 phlebotomies two weeks apart due to her HCT of 61.5%. Pt expressed understanding.

## 2021-03-05 ENCOUNTER — HOSPITAL ENCOUNTER (OUTPATIENT)
Dept: INFUSION THERAPY | Age: 71
Setting detail: INFUSION SERIES
Discharge: HOME | End: 2021-03-05
Payer: MEDICARE

## 2021-03-05 VITALS
TEMPERATURE: 97.3 F | OXYGEN SATURATION: 94 % | RESPIRATION RATE: 16 BRPM | HEART RATE: 88 BPM | SYSTOLIC BLOOD PRESSURE: 123 MMHG | DIASTOLIC BLOOD PRESSURE: 67 MMHG

## 2021-03-05 DIAGNOSIS — D45 POLYCYTHEMIA VERA (HCC): Primary | ICD-10-CM

## 2021-03-05 PROCEDURE — 99211 OFF/OP EST MAY X REQ PHY/QHP: CPT

## 2021-03-05 PROCEDURE — 99195 PHLEBOTOMY: CPT

## 2021-03-05 NOTE — PROGRESS NOTES
Diagnosis: Polycythemia Vera    Pre-Phlebotomy: BP /75   Pulse 84   Resp 16   SpO2 94%       Post-Phlebotomy: BP ***/***, HR ***    Volume Removed: *** grams    Complications: ***    Comments: ***        LOT# [id number]    Exp: 7-2022      Valentino Blood. Avila

## 2021-03-05 NOTE — PROGRESS NOTES
Diagnosis: polycythemia vera    Pre-Phlebotomy: /75, HR 84    Post-Phlebotomy: /67, HR 88    Volume Removed: 278 grams    Complications: none    Comments: none        LOT# [id number]    Exp: 7-2022      Kirksey Card

## 2021-03-07 LAB — COMMENT: NORMAL

## 2021-03-08 ENCOUNTER — OFFICE VISIT (OUTPATIENT)
Dept: ONCOLOGY | Age: 71
End: 2021-03-08
Payer: MEDICARE

## 2021-03-08 ENCOUNTER — HOSPITAL ENCOUNTER (OUTPATIENT)
Dept: INFUSION THERAPY | Age: 71
Discharge: HOME | End: 2021-03-08
Payer: MEDICARE

## 2021-03-08 VITALS
DIASTOLIC BLOOD PRESSURE: 76 MMHG | HEIGHT: 63 IN | TEMPERATURE: 96.9 F | HEART RATE: 70 BPM | RESPIRATION RATE: 16 BRPM | BODY MASS INDEX: 28.81 KG/M2 | SYSTOLIC BLOOD PRESSURE: 136 MMHG | WEIGHT: 162.6 LBS | OXYGEN SATURATION: 97 %

## 2021-03-08 DIAGNOSIS — C44.92 SQUAMOUS CELL CARCINOMA OF SKIN, UNSPECIFIED: ICD-10-CM

## 2021-03-08 DIAGNOSIS — D50.9 IRON DEFICIENCY ANEMIA, UNSPECIFIED IRON DEFICIENCY ANEMIA TYPE: ICD-10-CM

## 2021-03-08 DIAGNOSIS — D45 POLYCYTHEMIA VERA (HCC): Primary | ICD-10-CM

## 2021-03-08 PROCEDURE — 4040F PNEUMOC VAC/ADMIN/RCVD: CPT | Performed by: INTERNAL MEDICINE

## 2021-03-08 PROCEDURE — G8427 DOCREV CUR MEDS BY ELIG CLIN: HCPCS | Performed by: INTERNAL MEDICINE

## 2021-03-08 PROCEDURE — 3017F COLORECTAL CA SCREEN DOC REV: CPT | Performed by: INTERNAL MEDICINE

## 2021-03-08 PROCEDURE — 99214 OFFICE O/P EST MOD 30 MIN: CPT | Performed by: INTERNAL MEDICINE

## 2021-03-08 PROCEDURE — 1123F ACP DISCUSS/DSCN MKR DOCD: CPT | Performed by: INTERNAL MEDICINE

## 2021-03-08 PROCEDURE — G8482 FLU IMMUNIZE ORDER/ADMIN: HCPCS | Performed by: INTERNAL MEDICINE

## 2021-03-08 PROCEDURE — 1036F TOBACCO NON-USER: CPT | Performed by: INTERNAL MEDICINE

## 2021-03-08 PROCEDURE — 99211 OFF/OP EST MAY X REQ PHY/QHP: CPT

## 2021-03-08 PROCEDURE — G8400 PT W/DXA NO RESULTS DOC: HCPCS | Performed by: INTERNAL MEDICINE

## 2021-03-08 PROCEDURE — 1090F PRES/ABSN URINE INCON ASSESS: CPT | Performed by: INTERNAL MEDICINE

## 2021-03-08 PROCEDURE — G8417 CALC BMI ABV UP PARAM F/U: HCPCS | Performed by: INTERNAL MEDICINE

## 2021-03-08 NOTE — PROGRESS NOTES
Patient Name: [facility]  Patient : 1950  Patient MRN: [id number]     Primary Oncologist: Pooja Rocha MD  PCP: Dr Darryl Jacinto        Date of Service: 3/8/2021      Chief Complaint:   Chief Complaint   Patient presents with    Follow-up        Active Problem list  1. Polycythemia vera ([address]    2. Squamous cell carcinoma of skin, unspecified    3. Iron deficiency anemia, unspecified iron deficiency anemia type           HPI:        Referred in 2016 for polycythemia, CBC with wbc 13.6, Hb 14.4 hct 47.8, plt 513K  16 Jak2 postive  Was started on Hydrea as h/o MI needing CABG and also h/o TIA x 2, dose adjusted as needed. Then with SHAILA, had EGD, Cscope and VCE in may 2016  Cscope with diverticulosis and nonbleeding internal hemorrhoids  EGD with erythematous stomach but biopsy neg for h.pylori or malignancy  Capsule endoscopy with apthous ulcers in the small bowel. 18: Hydrea 1000mg OD    2018: Ct abdomen and pelvis:Impression  No renal or ureteral stone. No bladder stone. Distal left ureter does not completely opacify but otherwise appears  unremarkable. Otherwise no filling defect within the renal or ureteral  collecting systems. Mild irregularity along the posterior bladder wall which may be related to  ureterovesical junction bilaterally. Recommend further evaluation with  cystoscopy given hematuria.       19: CBC with wbc 11, Hb 20.2, hct 60, mcv 105, plt 171K  Creatinine 1.06, alk [id number]  ldh 296    3/12/19:CBC with wbc 11.9, Hb 15.6, hct 49.2, mcv 106, plt 384K  Ferritin 9  sats 7    2020 LDH of 149 CMP with sodium 138 potassium 4.6 glucose of 37 creatinine 1 calcium 10.3 ALT 8 AST 10 alk phos 115 CBC with WBC 11.7 hemoglobin 13.1 hematocrit 47.7 MCV of 101.7 platelets of 419      2020 EGD with Possible barrettes but biopsy negative     PMH:   Polycythemia vera  Hypothyroidism  CAD  Lower back pain  HTN  HLP    PSH:  left rotator cuff repair 2017  Laminectomies x 2  cholecystectomy  hernia repair  removal of renal ca;culi  Total thyroidectomy  CABG  hemorrhoidectomy x 2  Hysterectomy  Lumpectomy left breast, non malignant  Eye lid surgery    Allergies: None    SH: Grand son lives with her. 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 FH: Brother has leukemia ?type. No other blood dyscrasias    Interval History  3/8/2021:Arrived alone to the clinic today. Reported that overall she feels Ok but at times she feels tired. Did not report any fever, night sweats. Lost 10 lbs but has been busy moving. Chronic dry cough. Reported that she increased the hydrea to 1500mg a week ago. Also underwent phlebotomy once last week. Also on ferrous gluconate 1 tablet/day. Denied any overt bleeding. Denied any chest pain, increased shortness of breath, palpitations or dizziness. Denied any abdominal pain, nausea, vomiting, diarrhea, constipation. Denied any rash. Review of Systems   Per interval history; otherwise 10 point ROS is negative              Vital Signs:  /76 (Site: Left Upper Arm, Position: Sitting, Cuff Size: Large Adult)   Pulse 70   Temp 96.9 °F (36.1 °C) (Temporal)   Resp 16   Ht 5' 3\" (1.6 m)   Wt 162 lb 9.6 oz (73.8 kg)   SpO2 97%   BMI 28.80 kg/m²     Physical Exam:    CONSTITUTIONAL: awake, alert, overweight/obese   EYES:No palor or icterus   ENT:NCAT   NECK: No JVD   HEMATOLOGIC/LYMPHATIC: no cervical, supraclavicular or axillary lymphadenopathy   LUNGS: CTAB, no wheeze   CARDIOVASCULAR:Sternal scar healed well. s1s2 rrr SM++  ABDOMEN: soft ntnd bs pos, no hsm  NEUROLOGIC: GI   SKIN: AKs and echymosis, Scalp scar well healed.   EXTREMITIES: no LE edema bilaterally      Labs:    Hematology:  Lab Results   Component Value Date    WBC 21.5 (H) 03/01/2021    RBC 7.87 (H) 03/01/2021    HGB 18.5 (H) 03/01/2021    HCT 61.5 (H) 03/01/2021    MCV 78.1 03/01/2021    MCH 23.5 (L) 03/01/2021    MCHC 30.1 (L) 03/01/2021    RDW 21.1 (H) 03/01/2021     03/01/2021    MPV 10.4 03/01/2021    BANDSPCT 3 (L) 10/11/2019    SEGSPCT 81.8 (H) 03/01/2021    EOSRELPCT 4.4 (H) 03/01/2021    BASOPCT 1.3 (H) 03/01/2021    LYMPHOPCT 10.2 (L) 03/01/2021    MONOPCT 2.3 03/01/2021    BANDABS 0.38 10/11/2019    SEGSABS 17.6 03/01/2021    EOSABS 0.9 03/01/2021    BASOSABS 0.3 03/01/2021    LYMPHSABS 2.2 03/01/2021    MONOSABS 0.5 03/01/2021    DIFFTYPE AUTOMATED DIFFERENTIAL 03/01/2021    ANISOCYTOSIS 1+ 12/27/2019    POLYCHROM 1+ 12/27/2019    WBCMORP FEW  ATYPICAL LYMPH(S) NOTED   12/23/2019    PLTM RARE LARGE PLATELETS SEEN ON SMEAR 10/11/2019     No results found for: ESR    Chemistry:  Lab Results   Component Value Date     03/01/2021    K 5.2 (H) 03/01/2021     03/01/2021    CO2 29 03/01/2021    BUN 10 03/01/2021    CREATININE 1.1 03/01/2021    GLUCOSE 83 03/01/2021    CALCIUM 10.6 03/01/2021    PROT 6.4 03/01/2021    LABALBU 4.0 03/01/2021    BILITOT 0.6 03/01/2021    ALKPHOS 130 (H) 03/01/2021    AST 14 (L) 03/01/2021    ALT 11 03/01/2021    LABGLOM 49 (L) 03/01/2021    GFRAA 59 (L) 03/01/2021    MG 2.0 09/26/2015    POCCA 1.31 09/23/2015    POCGLU 104 (H) 09/25/2015     Lab Results   Component Value Date     03/01/2021     No components found for: LD  Lab Results   Component Value Date    TSHHS 2.720 05/06/2019    T4FREE 1.20 05/06/2019    FT3 2.8 11/08/2016       Immunology:  Lab Results   Component Value Date    PROT 6.4 03/01/2021     No results found for: Tom Munoz, REGINALDOLCR  No results found for: B2M    Coagulation Panel:  Lab Results   Component Value Date    PROTIME 11.0 (L) 09/17/2020    INR 0.91 09/17/2020    APTT 31.3 09/17/2020       Anemia Panel:  Lab Results   Component Value Date    HXAFCPSF77 479.8 11/18/2020    FOLATE 6.3 11/18/2020       Tumor Markers:  No results found for: , CEA, , LABCA2, PSA      Imaging: Reviewed     Pathology:Reviewed Observations:  Performance Status: ECOG 0  Depression Status: No data recorded          Assessment & Plan:  PVera/Low ferritin: Low Erythropoetin and JAK2 + suggestive of polycythemia vera. Started Hydrea jan 2018, as H/O thrombosis(needing CABG) and also Possible TIA. leukocytosis in may 2019 was most probably sec to steroids, Flow at that point with left shift but otherwise normal.  CBC march 2021  with wbc 21.5, Hb 18.5, hct 61, mcv 78.4, platelet 488. Ferritin 27, sats 7% . Recommend Increasing hydrea to 1500mg OD, increase iron to PO bid and phlebotomy x 2. Recommend phlebotomy for goal hct <46%, but hold off as low ferritin  Continue ASA and adequate hydration. Iron indices and ferritin were suggestive of jeff. Note rectal bleed most probably sec to hemorrhoids, phlebotomy. Recent GI eval normal , no overt bleeding. On oral iron once daily, will increase to bid. Adequate bowel regimen. Increased alk Po4: H/o cholecystectomy    Skin cancer: Is followed by dermatology. Looks like residual cancer and plan for MOHS soon. Lipoma on the neck: Defer intervention per PCP    Easy bruising: PT/PTT normal    Screening: Is uptodate on mammogram and colonoscopies. Dexa scan 2019 normal. No further pap smears. RTC   April 5 2021 or earlier if new Sx    Discussed the above findings and plan with the pt. She verbalizes understanding. Answered all questions. Discussed healthy lifestyle including exercise and weight loss. Recommend follow up with PCP and other specialists. Please do not hesitate to call if you need any further information. She has received first dose of COVID Vaccine         I have recommended that the patient follow CDC guidelines for prevention of COVID-19 infection.     [id number]

## 2021-03-08 NOTE — PROGRESS NOTES
MA Rooming Questions  Patient: Dwight Coon  MRN: [id number]    Date: 3/8/2021        1. Do you have any new issues?   no         2. Do you need any refills on medications?    no    3. Have you had any imaging done since your last visit?   no    4. Have you been hospitalized or seen in the emergency room since your last visit here?   no    5. Did the patient have a depression screening completed today?  No    No data recorded     PHQ-9 Given to (if applicable):               PHQ-9 Score (if applicable):                     [] Positive     []  Negative              Does question #9 need addressed (if applicable)                     [] Yes    []  No               Shanell Prakash MA

## 2021-03-19 ENCOUNTER — HOSPITAL ENCOUNTER (OUTPATIENT)
Dept: INFUSION THERAPY | Age: 71
Setting detail: INFUSION SERIES
Discharge: HOME | End: 2021-03-19
Payer: MEDICARE

## 2021-03-19 VITALS
OXYGEN SATURATION: 98 % | SYSTOLIC BLOOD PRESSURE: 140 MMHG | TEMPERATURE: 97.3 F | RESPIRATION RATE: 14 BRPM | HEART RATE: 74 BPM | DIASTOLIC BLOOD PRESSURE: 76 MMHG

## 2021-03-19 DIAGNOSIS — D45 POLYCYTHEMIA VERA (HCC): Primary | ICD-10-CM

## 2021-03-19 PROCEDURE — 99195 PHLEBOTOMY: CPT

## 2021-03-19 PROCEDURE — 99211 OFF/OP EST MAY X REQ PHY/QHP: CPT

## 2021-03-19 NOTE — PROGRESS NOTES
Diagnosis: POLYCYTHEMIA    Pre-Phlebotomy: BP BP (!) 140/76   Pulse 74   Temp 97.3 °F (36.3 °C) (Infrared)   Resp 14   SpO2 98%       Post-Phlebotomy: /66, HR 74    Volume Removed: 590 grams per L.  Zulma Cao RN    Complications: none    Comments: tolerated well        LOT# [id number]    Exp: 7-2022      [address]

## 2021-04-06 ENCOUNTER — OFFICE VISIT (OUTPATIENT)
Dept: ONCOLOGY | Age: 71
End: 2021-04-06
Payer: MEDICARE

## 2021-04-06 ENCOUNTER — HOSPITAL ENCOUNTER (OUTPATIENT)
Dept: INFUSION THERAPY | Age: 71
Discharge: HOME | End: 2021-04-06
Payer: MEDICARE

## 2021-04-06 VITALS
TEMPERATURE: 97.6 F | BODY MASS INDEX: 28.28 KG/M2 | SYSTOLIC BLOOD PRESSURE: 130 MMHG | WEIGHT: 159.6 LBS | HEIGHT: 63 IN | RESPIRATION RATE: 16 BRPM | OXYGEN SATURATION: 94 % | HEART RATE: 76 BPM | DIASTOLIC BLOOD PRESSURE: 73 MMHG

## 2021-04-06 DIAGNOSIS — D50.9 IRON DEFICIENCY ANEMIA, UNSPECIFIED IRON DEFICIENCY ANEMIA TYPE: ICD-10-CM

## 2021-04-06 DIAGNOSIS — D69.6 THROMBOCYTOPENIA, UNSPECIFIED (HCC): ICD-10-CM

## 2021-04-06 DIAGNOSIS — D45 POLYCYTHEMIA VERA (HCC): ICD-10-CM

## 2021-04-06 DIAGNOSIS — C44.92 SQUAMOUS CELL CARCINOMA OF SKIN, UNSPECIFIED: ICD-10-CM

## 2021-04-06 DIAGNOSIS — K90.9 INTESTINAL MALABSORPTION, UNSPECIFIED TYPE: ICD-10-CM

## 2021-04-06 DIAGNOSIS — C44.92 SQUAMOUS CELL CARCINOMA OF SKIN, UNSPECIFIED: Primary | ICD-10-CM

## 2021-04-06 LAB
ALBUMIN SERPL-MCNC: 3.6 GM/DL (ref 3.4–5)
ALP BLD-CCNC: 98 IU/L (ref 40–129)
ALT SERPL-CCNC: 7 U/L (ref 10–40)
ANION GAP SERPL CALCULATED.3IONS-SCNC: 9 MMOL/L (ref 4–16)
AST SERPL-CCNC: 10 IU/L (ref 15–37)
BASOPHILS ABSOLUTE: 0 K/CU MM
BASOPHILS RELATIVE PERCENT: 0.3 % (ref 0–1)
BILIRUB SERPL-MCNC: 0.3 MG/DL (ref 0–1)
BUN BLDV-MCNC: 15 MG/DL (ref 6–23)
CALCIUM SERPL-MCNC: 9.4 MG/DL (ref 8.3–10.6)
CHLORIDE BLD-SCNC: 104 MMOL/L (ref 99–110)
CO2: 27 MMOL/L (ref 21–32)
CREAT SERPL-MCNC: 0.9 MG/DL (ref 0.6–1.1)
DIFFERENTIAL TYPE: ABNORMAL
EOSINOPHILS ABSOLUTE: 0.1 K/CU MM
EOSINOPHILS RELATIVE PERCENT: 4.1 % (ref 0–3)
FERRITIN: 233 NG/ML (ref 15–150)
FOLATE: 4.6 NG/ML (ref 3.1–17.5)
GFR AFRICAN AMERICAN: >60 ML/MIN/1.73M2
GFR NON-AFRICAN AMERICAN: >60 ML/MIN/1.73M2
GLUCOSE BLD-MCNC: 64 MG/DL (ref 70–99)
HCT VFR BLD CALC: 45.2 % (ref 37–47)
HEMOGLOBIN: 13.9 GM/DL (ref 12.5–16)
IRON: 178 UG/DL (ref 37–145)
LACTATE DEHYDROGENASE: 118 IU/L (ref 120–246)
LYMPHOCYTES ABSOLUTE: 1.4 K/CU MM
LYMPHOCYTES RELATIVE PERCENT: 47.1 % (ref 24–44)
MCH RBC QN AUTO: 25.1 PG (ref 27–31)
MCHC RBC AUTO-ENTMCNC: 30.8 % (ref 32–36)
MCV RBC AUTO: 81.7 FL (ref 78–100)
MONOCYTES ABSOLUTE: 0.1 K/CU MM
MONOCYTES RELATIVE PERCENT: 1.7 % (ref 0–4)
PCT TRANSFERRIN: 89 % (ref 10–44)
PDW BLD-RTO: 22.8 % (ref 11.7–14.9)
PLATELET # BLD: 57 K/CU MM (ref 140–440)
PMV BLD AUTO: 8.8 FL (ref 7.5–11.1)
POTASSIUM SERPL-SCNC: 3.8 MMOL/L (ref 3.5–5.1)
RBC # BLD: 5.53 M/CU MM (ref 4.2–5.4)
RETICULOCYTE COUNT PCT: 0.2 % (ref 0.2–2.2)
SEGMENTED NEUTROPHILS ABSOLUTE COUNT: 1.4 K/CU MM
SEGMENTED NEUTROPHILS RELATIVE PERCENT: 46.8 % (ref 36–66)
SODIUM BLD-SCNC: 140 MMOL/L (ref 135–145)
TOTAL IRON BINDING CAPACITY: 199 UG/DL (ref 250–450)
TOTAL PROTEIN: 5.7 GM/DL (ref 6.4–8.2)
UNSATURATED IRON BINDING CAPACITY: 21 UG/DL (ref 110–370)
VITAMIN B-12: 270.8 PG/ML (ref 211–911)
WBC # BLD: 2.9 K/CU MM (ref 4–10.5)

## 2021-04-06 PROCEDURE — 85045 AUTOMATED RETICULOCYTE COUNT: CPT

## 2021-04-06 PROCEDURE — 3017F COLORECTAL CA SCREEN DOC REV: CPT | Performed by: INTERNAL MEDICINE

## 2021-04-06 PROCEDURE — 83615 LACTATE (LD) (LDH) ENZYME: CPT

## 2021-04-06 PROCEDURE — 80053 COMPREHEN METABOLIC PANEL: CPT

## 2021-04-06 PROCEDURE — 36415 COLL VENOUS BLD VENIPUNCTURE: CPT

## 2021-04-06 PROCEDURE — 85025 COMPLETE CBC W/AUTO DIFF WBC: CPT

## 2021-04-06 PROCEDURE — 83010 ASSAY OF HAPTOGLOBIN QUANT: CPT

## 2021-04-06 PROCEDURE — 1036F TOBACCO NON-USER: CPT | Performed by: INTERNAL MEDICINE

## 2021-04-06 PROCEDURE — 82607 VITAMIN B-12: CPT

## 2021-04-06 PROCEDURE — 83540 ASSAY OF IRON: CPT

## 2021-04-06 PROCEDURE — G8427 DOCREV CUR MEDS BY ELIG CLIN: HCPCS | Performed by: INTERNAL MEDICINE

## 2021-04-06 PROCEDURE — 83550 IRON BINDING TEST: CPT

## 2021-04-06 PROCEDURE — 82728 ASSAY OF FERRITIN: CPT

## 2021-04-06 PROCEDURE — 99214 OFFICE O/P EST MOD 30 MIN: CPT | Performed by: INTERNAL MEDICINE

## 2021-04-06 PROCEDURE — 82746 ASSAY OF FOLIC ACID SERUM: CPT

## 2021-04-06 PROCEDURE — G8417 CALC BMI ABV UP PARAM F/U: HCPCS | Performed by: INTERNAL MEDICINE

## 2021-04-06 PROCEDURE — 1123F ACP DISCUSS/DSCN MKR DOCD: CPT | Performed by: INTERNAL MEDICINE

## 2021-04-06 PROCEDURE — G8400 PT W/DXA NO RESULTS DOC: HCPCS | Performed by: INTERNAL MEDICINE

## 2021-04-06 PROCEDURE — 4040F PNEUMOC VAC/ADMIN/RCVD: CPT | Performed by: INTERNAL MEDICINE

## 2021-04-06 PROCEDURE — 99211 OFF/OP EST MAY X REQ PHY/QHP: CPT

## 2021-04-06 PROCEDURE — 1090F PRES/ABSN URINE INCON ASSESS: CPT | Performed by: INTERNAL MEDICINE

## 2021-04-06 NOTE — PROGRESS NOTES
Patient Name: Janette Flor  Patient : 1950  Patient MRN: [id number]     Primary Oncologist: Jamie Thomas MD  PCP: Dr Viktoria Estrada        Date of Service: 2021      Chief Complaint:   Chief Complaint   Patient presents with    Follow-up        Active Problem list  1. Squamous cell carcinoma of skin, unspecified    2. Thrombocytopenia, unspecified (HCC)           HPI:        Referred in 2016 for polycythemia, CBC with wbc 13.6, Hb 14.4 hct 47.8, plt 513K  16 Jak2 postive  Was started on Hydrea as h/o MI needing CABG and also h/o TIA x 2, dose adjusted as needed. Then with SHAILA, had EGD, Cscope and VCE in may 2016  Cscope with diverticulosis and nonbleeding internal hemorrhoids  EGD with erythematous stomach but biopsy neg for h.pylori or malignancy  Capsule endoscopy with apthous ulcers in the small bowel. 18: Hydrea 1000mg OD    2018: Ct abdomen and pelvis:Impression  No renal or ureteral stone. No bladder stone. Distal left ureter does not completely opacify but otherwise appears  unremarkable. Otherwise no filling defect within the renal or ureteral  collecting systems. Mild irregularity along the posterior bladder wall which may be related to  ureterovesical junction bilaterally. Recommend further evaluation with  cystoscopy given hematuria.       19: CBC with wbc 11, Hb 20.2, hct 60, mcv 105, plt 171K  Creatinine 1.06, alk [id number]  ldh 296    3/12/19:CBC with wbc 11.9, Hb 15.6, hct 49.2, mcv 106, plt 384K  Ferritin 9  sats 7    2020 LDH of 149 CMP with sodium 138 potassium 4.6 glucose of 37 creatinine 1 calcium 10.3 ALT 8 AST 10 alk phos 115 CBC with WBC 11.7 hemoglobin 13.1 hematocrit 47.7 MCV of 101.7 platelets of 406      2020 EGD with Possible barrettes but biopsy negative     Had phlebotomy  and 2021     PMH:   Polycythemia vera  Hypothyroidism  CAD  Lower back pain  HTN  HLP    PSH:  left rotator cuff repair 2017  Laminectomies x 2  cholecystectomy  hernia repair  removal of renal ca;culi  Total thyroidectomy  CABG  hemorrhoidectomy x 2  Hysterectomy  Lumpectomy left breast, non malignant  Eye lid surgery    Allergies: None    SH: Grand son lives with her. 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 FH: Brother has leukemia ?type. No other blood dyscrasias    Interval History  4/6/2021:Arrived alone to the clinic today. Reported that she has been feeling very tired. No fever, night sweats. Intentional weight loss of about 15 lbs total. No bleeding. Tenderness in the mid chest area. No increased sob. No abdominal pain. Shins painful. No LE edema. Has been taking oral iron bid and hydrea 1500mg po daily. Reported that she has cancer recurrence on the scalp and having surgery on April 8, 15 and 29. Review of Systems   Per interval history; otherwise 10 point ROS is negative              Vital Signs:  /73 (Site: Left Upper Arm, Position: Sitting, Cuff Size: Large Adult)   Pulse 76   Temp 97.6 °F (36.4 °C) (Temporal)   Resp 16   Ht 5' 3\" (1.6 m)   Wt 159 lb 9.6 oz (72.4 kg)   SpO2 94%   BMI 28.27 kg/m²     Physical Exam:    CONSTITUTIONAL: awake, alert, overweight/obese   EYES:No palor or icterus   ENT:NCAT   NECK: No JVD   HEMATOLOGIC/LYMPHATIC: no cervical, supraclavicular or axillary lymphadenopathy   LUNGS: CTAB, no wheeze   CARDIOVASCULAR:Sternal scar healed well. s1s2 rrr SM++  ABDOMEN: soft ntnd bs pos, no hsm  NEUROLOGIC: GI   SKIN: AKs and echymosis, Scalp scar well healed.   EXTREMITIES: no LE edema bilaterally      Labs:    Hematology:  Lab Results   Component Value Date    WBC 2.9 (L) 04/06/2021    RBC 5.53 (H) 04/06/2021    HGB 13.9 04/06/2021    HCT 45.2 04/06/2021    MCV 81.7 04/06/2021    MCH 25.1 (L) 04/06/2021    MCHC 30.8 (L) 04/06/2021    RDW 22.8 (H) 04/06/2021    PLT 57 (L) 04/06/2021    MPV 8.8 04/06/2021    BANDST 3 (L) 10/11/2019    SEGSPCT 46.8 04/06/2021    EOSRELPCT 4.1 (H) 04/06/2021    BASOPCT 0.3 04/06/2021    LYMPHOPCT 47.1 (H) 04/06/2021    MONOPCT 1.7 04/06/2021    BANDABS 0.38 10/11/2019    SEGSABS 1.4 04/06/2021    EOSABS 0.1 04/06/2021    BASOSABS 0.0 04/06/2021    LYMPHSABS 1.4 04/06/2021    MONOSABS 0.1 04/06/2021    DIFFTYPE AUTOMATED DIFFERENTIAL 04/06/2021    ANISOCYTOSIS 1+ 12/27/2019    POLYCHROM 1+ 12/27/2019    WBCMORP FEW  ATYPICAL LYMPH(S) NOTED   12/23/2019    PLTM RARE LARGE PLATELETS SEEN ON SMEAR 10/11/2019     No results found for: ESR    Chemistry:  Lab Results   Component Value Date     03/01/2021    K 5.2 (H) 03/01/2021     03/01/2021    CO2 29 03/01/2021    BUN 10 03/01/2021    CREATININE 1.1 03/01/2021    GLUCOSE 83 03/01/2021    CALCIUM 10.6 03/01/2021    PROT 6.4 03/01/2021    LABALBU 4.0 03/01/2021    BILITOT 0.6 03/01/2021    ALKPHOS 130 (H) 03/01/2021    AST 14 (L) 03/01/2021    ALT 11 03/01/2021    LABGLOM 49 (L) 03/01/2021    GFRAA 59 (L) 03/01/2021    MG 2.0 09/26/2015    POCCA 1.31 09/23/2015    POCGLU 104 (H) 09/25/2015     Lab Results   Component Value Date     03/01/2021     No components found for: LD  Lab Results   Component Value Date    TSHHS 2.720 05/06/2019    T4FREE 1.20 05/06/2019    FT3 2.8 11/08/2016       Immunology:  Lab Results   Component Value Date    PROT 6.4 03/01/2021     No results found for: MICAELA Shah  No results found for: B2M    Coagulation Panel:  Lab Results   Component Value Date    PROTIME 11.0 (L) 09/17/2020    INR 0.91 09/17/2020    APTT 31.3 09/17/2020       Anemia Panel:  Lab Results   Component Value Date    [id number] 479.8 11/18/2020    FOLATE 6.3 11/18/2020       Tumor Markers:  No results found for: , CEA, , LABCA2, PSA      Imaging: Reviewed     Pathology:Reviewed     Observations:  Performance Status: ECOG 0  Depression Status: No data recorded          Assessment & Plan:  PVera/Low ferritin: Low Erythropoetin and JAK2 +

## 2021-04-07 ENCOUNTER — TELEPHONE (OUTPATIENT)
Dept: ONCOLOGY | Age: 71
End: 2021-04-07

## 2021-04-07 LAB — HAPTOGLOBIN: 66 MG/DL (ref 30–200)

## 2021-04-07 NOTE — TELEPHONE ENCOUNTER
Per Dr Shwetha Wan, wants to repeat CBC and that she is concern with patient's platelet count and that she is having a procedure tomorrow on her scalp, advised her of this information and scheduled her for lab appt next Tues, 04/13 and that we will be faxing results to Dr Smith Camarena for him to decide if patient should have the procedure or not, faxed to [phone] and [phone] for Dr Smith Camarena to review, confirm rec'd, also patient states understanding

## 2021-04-13 ENCOUNTER — HOSPITAL ENCOUNTER (OUTPATIENT)
Dept: INFUSION THERAPY | Age: 71
Discharge: HOME | End: 2021-04-13
Payer: MEDICARE

## 2021-04-13 DIAGNOSIS — D45 POLYCYTHEMIA VERA (HCC): ICD-10-CM

## 2021-04-13 DIAGNOSIS — C44.92 SQUAMOUS CELL CARCINOMA OF SKIN, UNSPECIFIED: ICD-10-CM

## 2021-04-13 DIAGNOSIS — D69.6 THROMBOCYTOPENIA, UNSPECIFIED (HCC): ICD-10-CM

## 2021-04-13 LAB
BASOPHILS ABSOLUTE: 0 K/CU MM
BASOPHILS RELATIVE PERCENT: 0.5 % (ref 0–1)
DIFFERENTIAL TYPE: ABNORMAL
EOSINOPHILS ABSOLUTE: 0.1 K/CU MM
EOSINOPHILS RELATIVE PERCENT: 2.3 % (ref 0–3)
HCT VFR BLD CALC: 46.9 % (ref 37–47)
HEMOGLOBIN: 14.6 GM/DL (ref 12.5–16)
LYMPHOCYTES ABSOLUTE: 1.7 K/CU MM
LYMPHOCYTES RELATIVE PERCENT: 44 % (ref 24–44)
MCH RBC QN AUTO: 25.7 PG (ref 27–31)
MCHC RBC AUTO-ENTMCNC: 31.1 % (ref 32–36)
MCV RBC AUTO: 82.7 FL (ref 78–100)
MONOCYTES ABSOLUTE: 0.4 K/CU MM
MONOCYTES RELATIVE PERCENT: 9.8 % (ref 0–4)
PDW BLD-RTO: 24.1 % (ref 11.7–14.9)
PLATELET # BLD: 155 K/CU MM (ref 140–440)
PMV BLD AUTO: 9.4 FL (ref 7.5–11.1)
RBC # BLD: 5.67 M/CU MM (ref 4.2–5.4)
SEGMENTED NEUTROPHILS ABSOLUTE COUNT: 1.7 K/CU MM
SEGMENTED NEUTROPHILS RELATIVE PERCENT: 43.4 % (ref 36–66)
WBC # BLD: 3.9 K/CU MM (ref 4–10.5)

## 2021-04-13 PROCEDURE — 85025 COMPLETE CBC W/AUTO DIFF WBC: CPT

## 2021-04-13 PROCEDURE — 36415 COLL VENOUS BLD VENIPUNCTURE: CPT

## 2021-04-18 ENCOUNTER — APPOINTMENT (OUTPATIENT)
Dept: CT IMAGING | Age: 71
End: 2021-04-18
Payer: MEDICARE

## 2021-04-18 ENCOUNTER — HOSPITAL ENCOUNTER (EMERGENCY)
Age: 71
Discharge: HOME | End: 2021-04-18
Attending: EMERGENCY MEDICINE
Payer: MEDICARE

## 2021-04-18 VITALS
BODY MASS INDEX: 26.58 KG/M2 | HEART RATE: 74 BPM | DIASTOLIC BLOOD PRESSURE: 81 MMHG | WEIGHT: 150 LBS | HEIGHT: 63 IN | RESPIRATION RATE: 15 BRPM | SYSTOLIC BLOOD PRESSURE: 164 MMHG | TEMPERATURE: 97.8 F | OXYGEN SATURATION: 96 %

## 2021-04-18 DIAGNOSIS — R68.84 JAW PAIN: Primary | ICD-10-CM

## 2021-04-18 DIAGNOSIS — D21.9 OSTEOCHONDROMATOSIS, SYNOVIAL: ICD-10-CM

## 2021-04-18 LAB
ANION GAP SERPL CALCULATED.3IONS-SCNC: 7 MMOL/L (ref 4–16)
BASOPHILS ABSOLUTE: 0 K/CU MM
BASOPHILS RELATIVE PERCENT: 0.2 % (ref 0–1)
BUN BLDV-MCNC: 11 MG/DL (ref 6–23)
CALCIUM SERPL-MCNC: 10 MG/DL (ref 8.3–10.6)
CHLORIDE BLD-SCNC: 104 MMOL/L (ref 99–110)
CO2: 26 MMOL/L (ref 21–32)
CREAT SERPL-MCNC: 0.9 MG/DL (ref 0.6–1.1)
DIFFERENTIAL TYPE: ABNORMAL
EOSINOPHILS ABSOLUTE: 0.1 K/CU MM
EOSINOPHILS RELATIVE PERCENT: 1.2 % (ref 0–3)
GFR AFRICAN AMERICAN: >60 ML/MIN/1.73M2
GFR NON-AFRICAN AMERICAN: >60 ML/MIN/1.73M2
GLUCOSE BLD-MCNC: 92 MG/DL (ref 70–99)
HCT VFR BLD CALC: 44.4 % (ref 37–47)
HEMOGLOBIN: 13.3 GM/DL (ref 12.5–16)
IMMATURE NEUTROPHIL %: 1.2 % (ref 0–0.43)
LYMPHOCYTES ABSOLUTE: 1.5 K/CU MM
LYMPHOCYTES RELATIVE PERCENT: 28.1 % (ref 24–44)
MCH RBC QN AUTO: 25.3 PG (ref 27–31)
MCHC RBC AUTO-ENTMCNC: 30 % (ref 32–36)
MCV RBC AUTO: 84.4 FL (ref 78–100)
MONOCYTES ABSOLUTE: 0.4 K/CU MM
MONOCYTES RELATIVE PERCENT: 8.1 % (ref 0–4)
PDW BLD-RTO: 23.9 % (ref 11.7–14.9)
PLATELET # BLD: 229 K/CU MM (ref 140–440)
PMV BLD AUTO: 9.3 FL (ref 7.5–11.1)
POTASSIUM SERPL-SCNC: 4 MMOL/L (ref 3.5–5.1)
RBC # BLD: 5.26 M/CU MM (ref 4.2–5.4)
SEGMENTED NEUTROPHILS ABSOLUTE COUNT: 3.2 K/CU MM
SEGMENTED NEUTROPHILS RELATIVE PERCENT: 61.2 % (ref 36–66)
SODIUM BLD-SCNC: 137 MMOL/L (ref 135–145)
TOTAL IMMATURE NEUTOROPHIL: 0.06 K/CU MM
TROPONIN T: <0.01 NG/ML
WBC # BLD: 5.2 K/CU MM (ref 4–10.5)

## 2021-04-18 PROCEDURE — 96374 THER/PROPH/DIAG INJ IV PUSH: CPT

## 2021-04-18 PROCEDURE — 6360000002 HC RX W HCPCS: Performed by: EMERGENCY MEDICINE

## 2021-04-18 PROCEDURE — 80048 BASIC METABOLIC PNL TOTAL CA: CPT

## 2021-04-18 PROCEDURE — 6370000000 HC RX 637 (ALT 250 FOR IP): Performed by: EMERGENCY MEDICINE

## 2021-04-18 PROCEDURE — 70487 CT MAXILLOFACIAL W/DYE: CPT

## 2021-04-18 PROCEDURE — 2580000003 HC RX 258: Performed by: EMERGENCY MEDICINE

## 2021-04-18 PROCEDURE — 93005 ELECTROCARDIOGRAM TRACING: CPT | Performed by: EMERGENCY MEDICINE

## 2021-04-18 PROCEDURE — 99283 EMERGENCY DEPT VISIT LOW MDM: CPT

## 2021-04-18 PROCEDURE — 84484 ASSAY OF TROPONIN QUANT: CPT

## 2021-04-18 PROCEDURE — 85025 COMPLETE CBC W/AUTO DIFF WBC: CPT

## 2021-04-18 PROCEDURE — 6360000004 HC RX CONTRAST MEDICATION: Performed by: EMERGENCY MEDICINE

## 2021-04-18 RX ORDER — HYDROCODONE BITARTRATE AND ACETAMINOPHEN 5; 325 MG/1; MG/1
1 TABLET ORAL ONCE
Status: COMPLETED | OUTPATIENT
Start: 2021-04-18 | End: 2021-04-18

## 2021-04-18 RX ORDER — IBUPROFEN 600 MG/1
600 TABLET ORAL EVERY 6 HOURS PRN
Qty: 21 TABLET | Refills: 0 | Status: SHIPPED | OUTPATIENT
Start: 2021-04-18 | End: 2021-10-04

## 2021-04-18 RX ORDER — KETOROLAC TROMETHAMINE 30 MG/ML
15 INJECTION, SOLUTION INTRAMUSCULAR; INTRAVENOUS ONCE
Status: COMPLETED | OUTPATIENT
Start: 2021-04-18 | End: 2021-04-18

## 2021-04-18 RX ORDER — HYDROCODONE BITARTRATE AND ACETAMINOPHEN 5; 325 MG/1; MG/1
1 TABLET ORAL EVERY 6 HOURS PRN
Qty: 8 TABLET | Refills: 0 | Status: SHIPPED | OUTPATIENT
Start: 2021-04-18 | End: 2021-04-23

## 2021-04-18 RX ORDER — SODIUM CHLORIDE 0.9 % (FLUSH) 0.9 %
10 SYRINGE (ML) INJECTION PRN
Status: DISCONTINUED | OUTPATIENT
Start: 2021-04-18 | End: 2021-04-18 | Stop reason: HOSPADM

## 2021-04-18 RX ADMIN — IOPAMIDOL 75 ML: 755 INJECTION, SOLUTION INTRAVENOUS at 19:27

## 2021-04-18 RX ADMIN — KETOROLAC TROMETHAMINE 15 MG: 30 INJECTION, SOLUTION INTRAMUSCULAR; INTRAVENOUS at 20:30

## 2021-04-18 RX ADMIN — HYDROCODONE BITARTRATE AND ACETAMINOPHEN 1 TABLET: 5; 325 TABLET ORAL at 20:30

## 2021-04-18 RX ADMIN — HYDROCODONE BITARTRATE AND ACETAMINOPHEN 1 TABLET: 5; 325 TABLET ORAL at 18:19

## 2021-04-18 RX ADMIN — SODIUM CHLORIDE, PRESERVATIVE FREE 10 ML: 5 INJECTION INTRAVENOUS at 19:27

## 2021-04-18 ASSESSMENT — PAIN SCALES - GENERAL
PAINLEVEL_OUTOF10: 9
PAINLEVEL_OUTOF10: 9

## 2021-04-18 ASSESSMENT — PAIN DESCRIPTION - ORIENTATION: ORIENTATION: LEFT

## 2021-04-18 ASSESSMENT — PAIN DESCRIPTION - LOCATION: LOCATION: JAW

## 2021-04-18 NOTE — ED NOTES
Pt resting no distress, denies needs at this time.  Call light in use     Tamara Conrad RN  04/18/21 [id number]

## 2021-04-18 NOTE — ED PROVIDER NOTES
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 Worsened when she speaks. Has some associated facial swelling with this. Unaware of any triggering or modifying factors. Is tried taking ibuprofen without much improvement at home. Denies any other associated symptoms or complaints or concerns.       REVIEW OF SYSTEMS    Constitutional: negative for fever, chills  Neurological: negative for HA, focal weakness, loss of sensation  Ophthalmic: negative for vision change  ENT: negative for congestion, rhinorrhea  Cardiovascular: negative for chest pain  Respiratory: negative for SOB, cough  GI: negative for abdominal pain, nausea, vomiting, diarrhea, constipation  : negative for dysuria, hematuria  Musculoskeletal: negative for myalgias, decreased ROM, joint swelling  Dermatological: negative for rash, wounds  Heme: Negative for bleeding, bruising      PAST MEDICAL HISTORY  Past Medical History:   Diagnosis Date    Cancer [facility])     Coronary artery disease involving native coronary artery of native heart with angina pectoris ([facility] [address] 10/22/2015    Depression     GERD (gastroesophageal reflux disease)     Hyperlipidemia     Insomnia     Polycythemia     Thyroid disease        CURRENT MEDICATIONS  [unfilled]    ALLERGIES  Allergies   Allergen Reactions    Sulfa Antibiotics Other (See Comments)     Was told not to take       SURGICAL HISTORY  Past Surgical History:   Procedure Laterality Date    BACK SURGERY      2 surgeries    CARDIAC SURGERY      quad    CHOLECYSTECTOMY      HERNIA REPAIR      HYSTERECTOMY      KIDNEY STONE SURGERY      LAMINECTOMY      THYROID SURGERY were reviewed and are as charted. Constitutional: Minimal distress, Non-toxic appearance  Eyes: Conjunctiva normal, No discharge  HENT: There is palpation over the left cheek and left mandibular ramus and the location around the parotid gland, otherwise normocephalic, Atraumatic, bilateral external ears normal, posterior oropharynx is nonerythematous and without exudate, uvula is midline, no trismus, no \"hot potato voice\" or dysphonia, oropharynx moist, Logan's andStensen's ducts appear normal, floor of mouth is soft and not brawny and nontender  Neck: Supple, no stridor, no grossly visible or palpable masses  Cardiovascular: Regular rate and rhythm, No murmurs, No rubs, No gallops  Pulmonary/Chest: Normal breath sounds, No respiratory distress or accessory muscle use, No wheezing, crackles or rhonchi. Abdomen: Soft, nondistended and nonrigid, No tenderness or peritoneal signs, No masses, normal bowel sounds  Back: No midline point tenderness, No paraspinous muscle tenderness.  No CVA tenderness  Extremities: No gross deformities, no edema, no tenderness  Neurologic: Normal motor function, Normal sensory function, No focal deficits  Skin: Warm, Dry, No erythema, No rash, No cyanosis, No mottling  Lymphatic: No lymphadenopathy in the following location(s): cervical  Psychiatric: Alert and oriented x3, Affect normal          EKG Interpretation    Interpreted by emergency department physician    Rhythm: normal sinus   Rate: normal  Axis: normal  Ectopy: none  Conduction: normal  ST Segments: normal  T Waves: normal  Q Waves: none    Clinical Impression: Normal        RADIOLOGY/PROCEDURES/LABS/MEDICATIONS ADMINISTERED:    I have reviewed and interpreted all of the currently available lab results from this visit (if applicable):  Results for orders placed or performed during the hospital encounter of 04/18/21   EKG 12 Lead   Result Value Ref Range    Ventricular Rate 72 BPM    Atrial Rate 72 BPM    P-R Interval 148 ms    QRS Duration 84 ms    Q-T Interval 370 ms    QTc Calculation (Bazett) 405 ms    P Axis 51 degrees    R Axis 12 degrees    T Axis 50 degrees    Diagnosis       Normal sinus rhythm  Normal ECG  No previous ECGs available            ABNORMAL LABS:  Labs Reviewed   CBC WITH AUTO DIFFERENTIAL   BASIC METABOLIC PANEL W/ REFLEX TO MG FOR LOW K   TROPONIN         IMAGING STUDIES ORDERED:  CT MAXILLOFACIAL W CONTRAST    I have personally viewed the imaging studies. The radiologist interpretation is:   CT MAXILLOFACIAL W CONTRAST    (Results Pending)         MEDICATIONS ADMINISTERED:  Medications   HYDROcodone-acetaminophen (NORCO) 5-325 MG per tablet 1 tablet (1 tablet Oral Given 4/18/21 1819)         COURSE & MEDICAL DECISION MAKING  Last vitals: BP (!) 164/81   Pulse 74   Temp 97.8 °F (36.6 °C) (Infrared)   Resp 15   Ht 5' 3\" (1.6 m)   Wt 150 lb (68 kg)   SpO2 96%   BMI 26.57 kg/m²     69-year-old female left-sided jaw pain. Suspect parotiditis versus sialoadenitis versus sialolithiasis. No clinical evidence to suggest referred pain from a cardiac or intrathoracic source. CT of the face still pending at this time at the end of my shift and the patient was signed out to the overnight attending, Dr. Jann Olson, who will follow-up on this and determine final disposition. Clinical Impression:  Jaw Pain    Disposition referral (if applicable):  No follow-up provider specified. Disposition medications (if applicable):  New Prescriptions    No medications on file       ED Provider Disposition Time  DISPOSITION            Electronically signed by: Brooks Hughes M.D., 4/18/2021 6:22 PM      This dictation was created with voice recognition software. While attempts have been made to review the dictation as it is transcribed, on occasion the spoken word can be misinterpreted by the technology leading to omissions or inappropriate words, phrases or sentences.       Nancy Hancock MD  04/18/21 [id number]

## 2021-04-18 NOTE — ED PROVIDER NOTES
CARE RECEIVED FROM: Dr. Minda Ching  I reviewed the jackson elements of the history, physical exam and initial treatment plan at the bedside. ANCILLARY DATA:  I reviewed the images. 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 Parotid gland   appears normal.           Labs Reviewed   CBC WITH AUTO DIFFERENTIAL - Abnormal; Notable for the following components:       Result Value    MCH 25.3 (*)     MCHC 30.0 (*)     RDW 23.9 (*)     Monocytes % 8.1 (*)     Immature Neutrophil % 1.2 (*)     All other components within normal limits   BASIC METABOLIC PANEL W/ REFLEX TO MG FOR LOW K   TROPONIN     MEDICAL DECISION MAKING / PLAN:  69 yr old female presented to the ER with complaints of left-sided cheek/facial pain over the last 2 days. She was noted to have swelling as well. Patient had cardiac work-up prior to my evaluation which is reassuring. At time of signout she had CT imaging of the face pending to evaluate for what seems to likely be sialadenitis/sialolithiasis. 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 Parotid gland appears normal in the scan. At this time I spoke with the patient by her findings and her need for follow-up with ENT/plastic surgery. Provided a dose of Toradol and a second dose of Norco here for pain. Discharged home with a prescription for Motrin and Norco.  Given follow-up resource information for the aforementioned specialist.  Return precautions provided. FINAL IMPRESSION:  1. Jaw pain    2. Osteochondromatosis, synovial      ?  Electronically signed by:  Acacia Clemons, 4/18/2021        Acacia Clemons DO  04/18/21 [id number]

## 2021-04-19 ENCOUNTER — HOSPITAL ENCOUNTER (OUTPATIENT)
Dept: INFUSION THERAPY | Age: 71
Discharge: HOME | End: 2021-04-19
Payer: MEDICARE

## 2021-04-19 ENCOUNTER — OFFICE VISIT (OUTPATIENT)
Dept: ONCOLOGY | Age: 71
End: 2021-04-19
Payer: MEDICARE

## 2021-04-19 VITALS
DIASTOLIC BLOOD PRESSURE: 70 MMHG | BODY MASS INDEX: 28.07 KG/M2 | SYSTOLIC BLOOD PRESSURE: 119 MMHG | HEART RATE: 81 BPM | WEIGHT: 158.4 LBS | TEMPERATURE: 96.3 F | HEIGHT: 63 IN | OXYGEN SATURATION: 92 %

## 2021-04-19 DIAGNOSIS — D68.9 COAGULOPATHY (HCC): ICD-10-CM

## 2021-04-19 DIAGNOSIS — K90.9 INTESTINAL MALABSORPTION, UNSPECIFIED TYPE: ICD-10-CM

## 2021-04-19 DIAGNOSIS — D45 POLYCYTHEMIA VERA (HCC): Primary | ICD-10-CM

## 2021-04-19 DIAGNOSIS — C44.92 SQUAMOUS CELL CARCINOMA OF SKIN, UNSPECIFIED: ICD-10-CM

## 2021-04-19 DIAGNOSIS — D50.9 IRON DEFICIENCY ANEMIA, UNSPECIFIED IRON DEFICIENCY ANEMIA TYPE: ICD-10-CM

## 2021-04-19 DIAGNOSIS — E53.8 B12 DEFICIENCY: ICD-10-CM

## 2021-04-19 PROCEDURE — 1090F PRES/ABSN URINE INCON ASSESS: CPT | Performed by: INTERNAL MEDICINE

## 2021-04-19 PROCEDURE — 3017F COLORECTAL CA SCREEN DOC REV: CPT | Performed by: INTERNAL MEDICINE

## 2021-04-19 PROCEDURE — 99211 OFF/OP EST MAY X REQ PHY/QHP: CPT

## 2021-04-19 PROCEDURE — G8400 PT W/DXA NO RESULTS DOC: HCPCS | Performed by: INTERNAL MEDICINE

## 2021-04-19 PROCEDURE — 99214 OFFICE O/P EST MOD 30 MIN: CPT | Performed by: INTERNAL MEDICINE

## 2021-04-19 PROCEDURE — 1036F TOBACCO NON-USER: CPT | Performed by: INTERNAL MEDICINE

## 2021-04-19 PROCEDURE — G8417 CALC BMI ABV UP PARAM F/U: HCPCS | Performed by: INTERNAL MEDICINE

## 2021-04-19 PROCEDURE — G8427 DOCREV CUR MEDS BY ELIG CLIN: HCPCS | Performed by: INTERNAL MEDICINE

## 2021-04-19 PROCEDURE — 1123F ACP DISCUSS/DSCN MKR DOCD: CPT | Performed by: INTERNAL MEDICINE

## 2021-04-19 PROCEDURE — 4040F PNEUMOC VAC/ADMIN/RCVD: CPT | Performed by: INTERNAL MEDICINE

## 2021-04-19 ASSESSMENT — PATIENT HEALTH QUESTIONNAIRE - PHQ9
1. LITTLE INTEREST OR PLEASURE IN DOING THINGS: 0
SUM OF ALL RESPONSES TO PHQ QUESTIONS 1-9: 0
2. FEELING DOWN, DEPRESSED OR HOPELESS: 0

## 2021-04-19 NOTE — PROGRESS NOTES
Patient Name: Negra Garcia  Patient : 1950  Patient MRN: [id number]     Primary Oncologist: Linda Thompson MD  PCP: Dr Duncan Cisneros        Date of Service: 2021      Chief Complaint:   Chief Complaint   Patient presents with    Follow-up        Active Problem list  1. Polycythemia vera ([facility] [address]    2. Squamous cell carcinoma of skin, unspecified    3. Coagulopathy ([facility] [address]    4. Iron deficiency anemia, unspecified iron deficiency anemia type    5. Intestinal malabsorption, unspecified type           HPI:        Referred in 2016 for polycythemia, CBC with wbc 13.6, Hb 14.4 hct 47.8, plt 513K  16 Jak2 postive  Was started on Hydrea as h/o MI needing CABG and also h/o TIA x 2, dose adjusted as needed. Then with SHAILA, had EGD, Cscope and VCE in may 2016  Cscope with diverticulosis and nonbleeding internal hemorrhoids  EGD with erythematous stomach but biopsy neg for h.pylori or malignancy  Capsule endoscopy with apthous ulcers in the small bowel. 18: Hydrea 1000mg OD    2018: Ct abdomen and pelvis:Impression  No renal or ureteral stone. No bladder stone. Distal left ureter does not completely opacify but otherwise appears  unremarkable. Otherwise no filling defect within the renal or ureteral  collecting systems. Mild irregularity along the posterior bladder wall which may be related to  ureterovesical junction bilaterally. Recommend further evaluation with  cystoscopy given hematuria.       19: CBC with wbc 11, Hb 20.2, hct 60, mcv 105, plt 171K  Creatinine 1.06, alk [id number]  ldh 296    3/12/19:CBC with wbc 11.9, Hb 15.6, hct 49.2, mcv 106, plt 384K  Ferritin 9  sats 7    2020 LDH of 149 CMP with sodium 138 potassium 4.6 glucose of 37 creatinine 1 calcium 10.3 ALT 8 AST 10 alk phos 115 CBC with WBC 11.7 hemoglobin 13.1 hematocrit 47.7 MCV of 101.7 platelets of 907      2020 EGD with Possible barrettes but biopsy negative     Had phlebotomy  and 2021 ASA and hydrea were held sec to cytopenia    4/18/21: Presented to ER with left jaw swelling and pain. CT was done which revealed as below which started 4/17/21. No fever. No bleeding. 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  Parotid gland   appears normal.         PMH:   Polycythemia vera  Hypothyroidism  CAD  Lower back pain  HTN  HLP    PSH:  left rotator cuff repair 2017  Laminectomies x 2  cholecystectomy  hernia repair  removal of renal ca;culi  Total thyroidectomy  CABG  hemorrhoidectomy x 2  Hysterectomy  Lumpectomy left breast, non malignant  Eye lid surgery    Allergies: None    SH: Grand son lives with her. 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 FH: Brother has leukemia ?type. No other blood dyscrasias    Interval History  4/19/2021:Arrived alone to the clinic today. Reported that she parented to the ER with left jaw swelling and pain for 1-2 days. No fever. No bleeding. Has been prescribed Ibuprofen and Norco, pain and swelling slightly better. Has an appt with ENT. No night sweats, chest pain, increased sob. No weight loss. Is off of ASA and hydrea. Had resection of the skin lesion twice. Review of Systems   Per interval history; otherwise 10 point ROS is negative              Vital Signs:  /70 (Site: Right Upper Arm, Position: Sitting, Cuff Size: Medium Adult)   Pulse 81   Temp 96.3 °F (35.7 °C) (Temporal)   Ht 5' 3\" (1.6 m)   Wt 158 lb 6.4 oz (71.8 kg)   SpO2 92%   BMI 28.06 kg/m²     Physical Exam:    CONSTITUTIONAL: awake, alert, overweight/obese   EYES:No palor or icterus   ENT:NCAT, left sided facial swelling and ttp. No overlying erythema.     NECK: No JVD   HEMATOLOGIC/LYMPHATIC: no cervical, supraclavicular or axillary lymphadenopathy   LUNGS: CTAB, no wheeze   CARDIOVASCULAR:Sternal scar healed well. s1s2 rrr SM++  ABDOMEN: soft ntnd bs pos, no hsm  NEUROLOGIC: GI   SKIN: AKs and 09/17/2020    APTT 31.3 09/17/2020       Anemia Panel:  Lab Results   Component Value Date    YLVUZVKO20 270.8 04/06/2021    FOLATE 4.6 04/06/2021       Tumor Markers:  No results found for: , CEA, , LABCA2, PSA      Imaging: Reviewed     Pathology:Reviewed     Observations:  Performance Status: ECOG 0  Depression Status: PHQ-9 Total Score: 0 (4/19/2021  1:07 PM)          Assessment & Plan:  PVera/Low ferritin: Low Erythropoetin and JAK2 + suggestive of polycythemia vera. Started Hydrea jan 2018, as H/O thrombosis(needing CABG) and also Possible TIA. leukocytosis in may 2019 was most probably sec to steroids, Flow at that point with left shift but otherwise normal.  CBC march 2021  with wbc 21.5, Hb 18.5, hct 61, mcv 78.4, platelet 336. Ferritin 27, sats 7% . Recommended Increasing hydrea to 1500mg OD, increase iron to PO bid and phlebotomy x 2. Continued ASA and adequate hydration. Note severe decline in wbc and platelet counts , consistent upon recheck CBC. Could be sec to increased hydrea dose, phlebotomy. Hydrea and ASA held. No hemolysis. FLOW with no evidence of acute leukemia  Resume Hydrea at 500mg once daily. LD ASA may be resumed. Low b12 Parenteral supplements     Left sided facial/jaw swelling: CT MF as above. Follow with ENT. Iron indices and ferritin were suggestive of jeff. Note rectal bleed most probably sec to hemorrhoids, phlebotomy. Recent GI eval normal , no overt bleeding. On oral iron OD     Constipation: Recommend stool softener and laxatives as needed    Increased alk Po4: H/o cholecystectomy    Skin cancer: Is followed by dermatology. Looks like residual cancer and underwent Biopsy ? Mohs. Lipoma on the neck: Defer intervention per PCP    Easy bruising: PT/PTT normal    Screening: Is uptodate on mammogram and colonoscopies. Dexa scan 2019 normal. No further pap smears. Discussed the above findings and plan with the pt. She verbalizes understanding.  Answered all questions. Discussed healthy lifestyle including exercise and weight loss. Recommend follow up with PCP and other specialists. Please do not hesitate to call if you need any further information. She has received two  doses of COVID Vaccine    RTC May 17 2021  or earlier if new Sx. I have recommended that the patient follow CDC guidelines for prevention of COVID-19 infection.     [id number] Sarah Espinosa

## 2021-04-19 NOTE — PROGRESS NOTES
ELISHA Cuellar Questions  Patient: Nathan Billy  MRN: [id number]    Date: 4/19/2021        1. Do you have any new issues? yes - ER visit for Jaw and has had 2 surgeries for skin cancer         2. Do you need any refills on medications?    no    3. Have you had any imaging done since your last visit? yes - at [facility] ER     4. Have you been hospitalized or seen in the emergency room since your last visit here?   yes - 04/18    5. Did the patient have a depression screening completed today?  Yes    PHQ-9 Total Score: 0 (4/19/2021  1:07 PM)       PHQ-9 Given to (if applicable):               PHQ-9 Score (if applicable):                     [] Positive     []  Negative              Does question #9 need addressed (if applicable)                     [] Yes    []  No               Tarik Mitchell MA

## 2021-04-20 LAB
EKG ATRIAL RATE: 72 BPM
EKG DIAGNOSIS: NORMAL
EKG P AXIS: 51 DEGREES
EKG P-R INTERVAL: 148 MS
EKG Q-T INTERVAL: 370 MS
EKG QRS DURATION: 84 MS
EKG QTC CALCULATION (BAZETT): 405 MS
EKG R AXIS: 12 DEGREES
EKG T AXIS: 50 DEGREES
EKG VENTRICULAR RATE: 72 BPM

## 2021-04-20 PROCEDURE — 93010 ELECTROCARDIOGRAM REPORT: CPT | Performed by: INTERNAL MEDICINE

## 2021-04-22 ENCOUNTER — TELEPHONE (OUTPATIENT)
Dept: INFUSION THERAPY | Age: 71
End: 2021-04-22

## 2021-05-03 ENCOUNTER — HOSPITAL ENCOUNTER (OUTPATIENT)
Dept: INFUSION THERAPY | Age: 71
Discharge: HOME | End: 2021-05-03
Payer: MEDICARE

## 2021-05-03 DIAGNOSIS — E53.8 B12 DEFICIENCY: ICD-10-CM

## 2021-05-03 DIAGNOSIS — K90.9 INTESTINAL MALABSORPTION, UNSPECIFIED TYPE: ICD-10-CM

## 2021-05-03 DIAGNOSIS — C44.92 SQUAMOUS CELL CARCINOMA OF SKIN, UNSPECIFIED: ICD-10-CM

## 2021-05-03 DIAGNOSIS — D68.9 COAGULOPATHY (HCC): ICD-10-CM

## 2021-05-03 DIAGNOSIS — D45 POLYCYTHEMIA VERA (HCC): Primary | ICD-10-CM

## 2021-05-03 DIAGNOSIS — D50.9 IRON DEFICIENCY ANEMIA, UNSPECIFIED IRON DEFICIENCY ANEMIA TYPE: ICD-10-CM

## 2021-05-03 LAB
ALBUMIN SERPL-MCNC: 4 GM/DL (ref 3.4–5)
ALP BLD-CCNC: 127 IU/L (ref 40–129)
ALT SERPL-CCNC: 10 U/L (ref 10–40)
ANION GAP SERPL CALCULATED.3IONS-SCNC: 5 MMOL/L (ref 4–16)
AST SERPL-CCNC: 11 IU/L (ref 15–37)
BASOPHILS ABSOLUTE: 0 K/CU MM
BASOPHILS RELATIVE PERCENT: 0.5 % (ref 0–1)
BILIRUB SERPL-MCNC: 0.2 MG/DL (ref 0–1)
BUN BLDV-MCNC: 13 MG/DL (ref 6–23)
CALCIUM SERPL-MCNC: 10.3 MG/DL (ref 8.3–10.6)
CHLORIDE BLD-SCNC: 101 MMOL/L (ref 99–110)
CO2: 29 MMOL/L (ref 21–32)
CREAT SERPL-MCNC: 1.1 MG/DL (ref 0.6–1.1)
DIFFERENTIAL TYPE: ABNORMAL
EOSINOPHILS ABSOLUTE: 0.1 K/CU MM
EOSINOPHILS RELATIVE PERCENT: 1.4 % (ref 0–3)
FERRITIN: 222 NG/ML (ref 15–150)
GFR AFRICAN AMERICAN: 59 ML/MIN/1.73M2
GFR NON-AFRICAN AMERICAN: 49 ML/MIN/1.73M2
GLUCOSE BLD-MCNC: 98 MG/DL (ref 70–99)
HCT VFR BLD CALC: 42.8 % (ref 37–47)
HEMOGLOBIN: 13.6 GM/DL (ref 12.5–16)
IRON: 103 UG/DL (ref 37–145)
LACTATE DEHYDROGENASE: 168 IU/L (ref 120–246)
LYMPHOCYTES ABSOLUTE: 2.3 K/CU MM
LYMPHOCYTES RELATIVE PERCENT: 26.5 % (ref 24–44)
MCH RBC QN AUTO: 27.3 PG (ref 27–31)
MCHC RBC AUTO-ENTMCNC: 31.8 % (ref 32–36)
MCV RBC AUTO: 85.8 FL (ref 78–100)
MONOCYTES ABSOLUTE: 0.5 K/CU MM
MONOCYTES RELATIVE PERCENT: 5.7 % (ref 0–4)
PCT TRANSFERRIN: 49 % (ref 10–44)
PDW BLD-RTO: 28.2 % (ref 11.7–14.9)
PLATELET # BLD: 354 K/CU MM (ref 140–440)
PMV BLD AUTO: 9.1 FL (ref 7.5–11.1)
POTASSIUM SERPL-SCNC: 4.5 MMOL/L (ref 3.5–5.1)
RBC # BLD: 4.99 M/CU MM (ref 4.2–5.4)
SEGMENTED NEUTROPHILS ABSOLUTE COUNT: 5.8 K/CU MM
SEGMENTED NEUTROPHILS RELATIVE PERCENT: 65.9 % (ref 36–66)
SODIUM BLD-SCNC: 135 MMOL/L (ref 135–145)
TOTAL IRON BINDING CAPACITY: 210 UG/DL (ref 250–450)
TOTAL PROTEIN: 6.6 GM/DL (ref 6.4–8.2)
UNSATURATED IRON BINDING CAPACITY: 107 UG/DL (ref 110–370)
WBC # BLD: 8.8 K/CU MM (ref 4–10.5)

## 2021-05-03 PROCEDURE — 85025 COMPLETE CBC W/AUTO DIFF WBC: CPT

## 2021-05-03 PROCEDURE — 96372 THER/PROPH/DIAG INJ SC/IM: CPT

## 2021-05-03 PROCEDURE — 83615 LACTATE (LD) (LDH) ENZYME: CPT

## 2021-05-03 PROCEDURE — 80053 COMPREHEN METABOLIC PANEL: CPT

## 2021-05-03 PROCEDURE — 36415 COLL VENOUS BLD VENIPUNCTURE: CPT

## 2021-05-03 PROCEDURE — 83540 ASSAY OF IRON: CPT

## 2021-05-03 PROCEDURE — 83550 IRON BINDING TEST: CPT

## 2021-05-03 PROCEDURE — 6360000002 HC RX W HCPCS

## 2021-05-03 PROCEDURE — 82728 ASSAY OF FERRITIN: CPT

## 2021-05-03 RX ORDER — CYANOCOBALAMIN 1000 UG/ML
1000 INJECTION INTRAMUSCULAR; SUBCUTANEOUS ONCE
Status: CANCELLED
Start: 2021-05-31

## 2021-05-03 RX ORDER — CYANOCOBALAMIN 1000 UG/ML
INJECTION INTRAMUSCULAR; SUBCUTANEOUS
Status: COMPLETED
Start: 2021-05-03 | End: 2021-05-03

## 2021-05-03 RX ORDER — CYANOCOBALAMIN 1000 UG/ML
1000 INJECTION INTRAMUSCULAR; SUBCUTANEOUS ONCE
Status: COMPLETED
Start: 2021-05-03 | End: 2021-05-03

## 2021-05-03 RX ADMIN — CYANOCOBALAMIN 1000 MCG: 1000 INJECTION INTRAMUSCULAR; SUBCUTANEOUS at 14:37

## 2021-05-03 RX ADMIN — CYANOCOBALAMIN 1000 MCG: 1000 INJECTION, SOLUTION INTRAMUSCULAR at 14:37

## 2021-05-03 NOTE — PROGRESS NOTES
Here for IM B12. Injection administered as ordered. Tolerated well. Reviewed AVS, copy given to patient. Discharged in stable condition.

## 2021-05-17 ENCOUNTER — HOSPITAL ENCOUNTER (OUTPATIENT)
Dept: INFUSION THERAPY | Age: 71
Discharge: HOME | End: 2021-05-17
Payer: MEDICARE

## 2021-05-17 ENCOUNTER — OFFICE VISIT (OUTPATIENT)
Dept: ONCOLOGY | Age: 71
End: 2021-05-17
Payer: MEDICARE

## 2021-05-17 VITALS
HEART RATE: 79 BPM | DIASTOLIC BLOOD PRESSURE: 71 MMHG | WEIGHT: 160 LBS | TEMPERATURE: 96 F | OXYGEN SATURATION: 96 % | HEIGHT: 63 IN | SYSTOLIC BLOOD PRESSURE: 136 MMHG | BODY MASS INDEX: 28.35 KG/M2 | RESPIRATION RATE: 16 BRPM

## 2021-05-17 DIAGNOSIS — D45 POLYCYTHEMIA VERA (HCC): ICD-10-CM

## 2021-05-17 DIAGNOSIS — K90.9 INTESTINAL MALABSORPTION, UNSPECIFIED TYPE: ICD-10-CM

## 2021-05-17 DIAGNOSIS — C44.92 SQUAMOUS CELL CARCINOMA OF SKIN, UNSPECIFIED: Primary | ICD-10-CM

## 2021-05-17 DIAGNOSIS — D50.9 IRON DEFICIENCY ANEMIA, UNSPECIFIED IRON DEFICIENCY ANEMIA TYPE: ICD-10-CM

## 2021-05-17 DIAGNOSIS — D69.6 THROMBOCYTOPENIA, UNSPECIFIED (HCC): ICD-10-CM

## 2021-05-17 PROCEDURE — 99214 OFFICE O/P EST MOD 30 MIN: CPT | Performed by: INTERNAL MEDICINE

## 2021-05-17 PROCEDURE — G8400 PT W/DXA NO RESULTS DOC: HCPCS | Performed by: INTERNAL MEDICINE

## 2021-05-17 PROCEDURE — 99211 OFF/OP EST MAY X REQ PHY/QHP: CPT

## 2021-05-17 PROCEDURE — G8427 DOCREV CUR MEDS BY ELIG CLIN: HCPCS | Performed by: INTERNAL MEDICINE

## 2021-05-17 PROCEDURE — 1123F ACP DISCUSS/DSCN MKR DOCD: CPT | Performed by: INTERNAL MEDICINE

## 2021-05-17 PROCEDURE — 1090F PRES/ABSN URINE INCON ASSESS: CPT | Performed by: INTERNAL MEDICINE

## 2021-05-17 PROCEDURE — 4040F PNEUMOC VAC/ADMIN/RCVD: CPT | Performed by: INTERNAL MEDICINE

## 2021-05-17 PROCEDURE — G8417 CALC BMI ABV UP PARAM F/U: HCPCS | Performed by: INTERNAL MEDICINE

## 2021-05-17 PROCEDURE — 1036F TOBACCO NON-USER: CPT | Performed by: INTERNAL MEDICINE

## 2021-05-17 PROCEDURE — 3017F COLORECTAL CA SCREEN DOC REV: CPT | Performed by: INTERNAL MEDICINE

## 2021-05-17 NOTE — PROGRESS NOTES
Patient Name: Sigrid Gunderson  Patient : 1950  Patient MRN: [id number]     Primary Oncologist: Duane Hughes MD  PCP: Dr Gerda Gibbs        Date of Service: 2021      Chief Complaint:   Chief Complaint   Patient presents with    Discuss Labs        Active Problem list  1. Squamous cell carcinoma of skin, unspecified    2. Polycythemia vera ([address]    3. Iron deficiency anemia, unspecified iron deficiency anemia type    4. Intestinal malabsorption, unspecified type    5. Thrombocytopenia, unspecified (H[facility])           HPI:        Referred in 2016 for polycythemia, CBC with wbc 13.6, Hb 14.4 hct 47.8, plt 513K  16 Jak2 postive  Was started on Hydrea as h/o MI needing CABG and also h/o TIA x 2, dose adjusted as needed. Then with SHAILA, had EGD, Cscope and VCE in may 2016  Cscope with diverticulosis and nonbleeding internal hemorrhoids  EGD with erythematous stomach but biopsy neg for h.pylori or malignancy  Capsule endoscopy with apthous ulcers in the small bowel. 18: Hydrea 1000mg OD    2018: Ct abdomen and pelvis:Impression  No renal or ureteral stone. No bladder stone. Distal left ureter does not completely opacify but otherwise appears  unremarkable. Otherwise no filling defect within the renal or ureteral  collecting systems. Mild irregularity along the posterior bladder wall which may be related to  ureterovesical junction bilaterally. Recommend further evaluation with  cystoscopy given hematuria.       19: CBC with wbc 11, Hb 20.2, hct 60, mcv 105, plt 171K  Creatinine 1.06, alk [id number]  ldh 296    3/12/19:CBC with wbc 11.9, Hb 15.6, hct 49.2, mcv 106, plt 384K  Ferritin 9  sats 7    2020 LDH of 149 CMP with sodium 138 potassium 4.6 glucose of 37 creatinine 1 calcium 10.3 ALT 8 AST 10 alk phos 115 CBC with WBC 11.7 hemoglobin 13.1 hematocrit 47.7 MCV of 101.7 platelets of 450      2020 EGD with Possible barrettes but biopsy negative     Had phlebotomy  and 2021 SKIN: AKs and echymosis, Scalp scar well-healed  EXTREMITIES: no LE edema bilaterally      Labs:    Hematology:  Lab Results   Component Value Date    WBC 8.8 05/03/2021    RBC 4.99 05/03/2021    HGB 13.6 05/03/2021    HCT 42.8 05/03/2021    MCV 85.8 05/03/2021    MCH 27.3 05/03/2021    MCHC 31.8 (L) 05/03/2021    RDW 28.2 (H) 05/03/2021     05/03/2021    MPV 9.1 05/03/2021    BANDSPCT 3 (L) 10/11/2019    SEGSPCT 65.9 05/03/2021    EOSRELPCT 1.4 05/03/2021    BASOPCT 0.5 05/03/2021    LYMPHOPCT 26.5 05/03/2021    MONOPCT 5.7 (H) 05/03/2021    BANDABS 0.38 10/11/2019    SEGSABS 5.8 05/03/2021    EOSABS 0.1 05/03/2021    BASOSABS 0.0 05/03/2021    LYMPHSABS 2.3 05/03/2021    MONOSABS 0.5 05/03/2021    DIFFTYPE AUTOMATED DIFFERENTIAL 05/03/2021    ANISOCYTOSIS 1+ 12/27/2019    POLYCHROM 1+ 12/27/2019    WBCMORP FEW  ATYPICAL LYMPH(S) NOTED   12/23/2019    PLTM RARE LARGE PLATELETS SEEN ON SMEAR 10/11/2019     No results found for: ESR    Chemistry:  Lab Results   Component Value Date     05/03/2021    K 4.5 05/03/2021     05/03/2021    CO2 29 05/03/2021    BUN 13 05/03/2021    CREATININE 1.1 05/03/2021    GLUCOSE 98 05/03/2021    CALCIUM 10.3 05/03/2021    PROT 6.6 05/03/2021    LABALBU 4.0 05/03/2021    BILITOT 0.2 05/03/2021    ALKPHOS 127 05/03/2021    AST 11 (L) 05/03/2021    ALT 10 05/03/2021    LABGLOM 49 (L) 05/03/2021    GFRAA 59 (L) 05/03/2021    MG 2.0 09/26/2015    POCCA 1.31 09/23/2015    POCGLU 104 (H) 09/25/2015     Lab Results   Component Value Date     05/03/2021     No components found for: LD  Lab Results   Component Value Date    TSHHS 2.720 05/06/2019    T4FREE 1.20 05/06/2019    FT3 2.8 11/08/2016       Immunology:  Lab Results   Component Value Date    PROT 6.6 05/03/2021     No results found for: Dilip Eduardo, KLFLCR  No results found for: B2M    Coagulation Panel:  Lab Results   Component Value Date    PROTIME 11.0 (L) 09/17/2020    INR 0.91 09/17/2020 APTT 31.3 09/17/2020       Anemia Panel:  Lab Results   Component Value Date    OVVECRAY28 270.8 04/06/2021    FOLATE 4.6 04/06/2021       Tumor Markers:  No results found for: , CEA, , LABCA2, PSA      Imaging: Reviewed     Pathology:Reviewed     Observations:  Performance Status: ECOG 0  Depression Status: No data recorded          Assessment & Plan:  PVera/Low ferritin: Low Erythropoetin and JAK2 + suggestive of polycythemia vera. Started Hydrea jan 2018, as H/O thrombosis(needing CABG) and also Possible TIA. leukocytosis in may 2019 was most probably sec to steroids, Flow at that point with left shift but otherwise normal.  CBC march 2021  with wbc 21.5, Hb 18.5, hct 61, mcv 78.4, platelet 063. Ferritin 27, sats 7% . Recommended Increasing hydrea to 1500mg OD, increase iron to PO bid and phlebotomy x 2. Continued ASA and adequate hydration. Note severe decline in wbc and platelet counts , consistent upon recheck CBC. Could be sec to increased hydrea dose, phlebotomy. Hydrea and ASA were held. No hemolysis. FLOW with no evidence of acute leukemia  Resumed Hydrea and low-dose aspirin once counts have almost normalized. We will continue for now. Low b12 Parenteral supplements     Left sided facial/jaw swelling: CT MF as above. Follow with ENT. Iron indices and ferritin were suggestive of jeff. Note rectal bleed most probably sec to hemorrhoids, phlebotomy. Recent GI eval normal , no overt bleeding. Recommend oral iron twice a week. Constipation: Recommend stool softener and laxatives as needed    Increased alk Po4: H/o cholecystectomy    Skin cancer: Is followed by dermatology. We will get all the path results. Lipoma on the neck: Defer intervention per PCP    Easy bruising: PT/PTT normal    Screening: Is uptodate on mammogram and colonoscopies. Dexa scan 2019 normal. No further pap smears. Discussed the above findings and plan with the pt. She verbalizes understanding.  Answered all questions. Discussed healthy lifestyle including exercise and weight loss. Recommend follow up with PCP and other specialists. Please do not hesitate to call if you need any further information. She has received two  doses of COVID Vaccine    RTC June 2021 or earlier if new Sx. I have recommended that the patient follow CDC guidelines for prevention of COVID-19 infection.     [id number] Sarah Espinosa

## 2021-05-17 NOTE — PROGRESS NOTES
MA Rooming Questions  Patient: Dana Valdez  MRN: [id number]    Date: 5/17/2021        1. Do you have any new issues?   no         2. Do you need any refills on medications?    no    3. Have you had any imaging done since your last visit?   no    4. Have you been hospitalized or seen in the emergency room since your last visit here?   no    5. Did the patient have a depression screening completed today?  Yes    No data recorded     PHQ-9 Given to (if applicable):               PHQ-9 Score (if applicable):                     [] Positive     []  Negative              Does question #9 need addressed (if applicable)                     [] Yes    []  No               Ganga Wynn CMA

## 2021-05-27 LAB — COMMENT: NORMAL

## 2021-06-07 ENCOUNTER — HOSPITAL ENCOUNTER (OUTPATIENT)
Dept: INFUSION THERAPY | Age: 71
Discharge: HOME | End: 2021-06-07
Payer: MEDICARE

## 2021-06-07 DIAGNOSIS — E53.8 B12 DEFICIENCY: Primary | ICD-10-CM

## 2021-06-07 PROCEDURE — 6360000002 HC RX W HCPCS: Performed by: INTERNAL MEDICINE

## 2021-06-07 PROCEDURE — 96372 THER/PROPH/DIAG INJ SC/IM: CPT

## 2021-06-07 RX ORDER — CYANOCOBALAMIN 1000 UG/ML
1000 INJECTION INTRAMUSCULAR; SUBCUTANEOUS ONCE
Status: COMPLETED
Start: 2021-06-07 | End: 2021-06-07

## 2021-06-07 RX ORDER — CYANOCOBALAMIN 1000 UG/ML
1000 INJECTION INTRAMUSCULAR; SUBCUTANEOUS ONCE
Status: CANCELLED
Start: 2021-06-28

## 2021-06-07 RX ADMIN — CYANOCOBALAMIN 1000 MCG: 1000 INJECTION, SOLUTION INTRAMUSCULAR at 14:43

## 2021-06-07 NOTE — PROGRESS NOTES
Patient arrived to treatment suite for vitamin B-12 injection. No questions or concerns for the doctor at this time. Treatment approved and given IM in left deltoid, band-aid applied. Patient tolerated well. Left treatment suite ambulatory. Discharge instructions declined.

## 2021-06-18 ENCOUNTER — HOSPITAL ENCOUNTER (OUTPATIENT)
Dept: INFUSION THERAPY | Age: 71
Discharge: HOME | End: 2021-06-18
Payer: MEDICARE

## 2021-06-18 DIAGNOSIS — C44.92 SQUAMOUS CELL CARCINOMA OF SKIN, UNSPECIFIED: ICD-10-CM

## 2021-06-18 DIAGNOSIS — D50.9 IRON DEFICIENCY ANEMIA, UNSPECIFIED IRON DEFICIENCY ANEMIA TYPE: ICD-10-CM

## 2021-06-18 DIAGNOSIS — D69.6 THROMBOCYTOPENIA, UNSPECIFIED (HCC): ICD-10-CM

## 2021-06-18 DIAGNOSIS — D45 POLYCYTHEMIA VERA (HCC): ICD-10-CM

## 2021-06-18 DIAGNOSIS — K90.9 INTESTINAL MALABSORPTION, UNSPECIFIED TYPE: ICD-10-CM

## 2021-06-18 LAB
ALBUMIN SERPL-MCNC: 4.3 GM/DL (ref 3.4–5)
ALP BLD-CCNC: 113 IU/L (ref 40–129)
ALT SERPL-CCNC: 8 U/L (ref 10–40)
ANION GAP SERPL CALCULATED.3IONS-SCNC: 8 MMOL/L (ref 4–16)
AST SERPL-CCNC: 11 IU/L (ref 15–37)
BASOPHILS ABSOLUTE: 0.1 K/CU MM
BASOPHILS RELATIVE PERCENT: 0.8 % (ref 0–1)
BILIRUB SERPL-MCNC: 0.3 MG/DL (ref 0–1)
BUN BLDV-MCNC: 13 MG/DL (ref 6–23)
CALCIUM SERPL-MCNC: 10.3 MG/DL (ref 8.3–10.6)
CHLORIDE BLD-SCNC: 106 MMOL/L (ref 99–110)
CO2: 27 MMOL/L (ref 21–32)
CREAT SERPL-MCNC: 1 MG/DL (ref 0.6–1.1)
DIFFERENTIAL TYPE: ABNORMAL
EOSINOPHILS ABSOLUTE: 0.3 K/CU MM
EOSINOPHILS RELATIVE PERCENT: 3.4 % (ref 0–3)
FERRITIN: 33 NG/ML (ref 15–150)
FOLATE: 6.6 NG/ML (ref 3.1–17.5)
GFR AFRICAN AMERICAN: >60 ML/MIN/1.73M2
GFR NON-AFRICAN AMERICAN: 55 ML/MIN/1.73M2
GLUCOSE BLD-MCNC: 83 MG/DL (ref 70–99)
HCT VFR BLD CALC: 49.4 % (ref 37–47)
HEMOGLOBIN: 15.9 GM/DL (ref 12.5–16)
IRON: 44 UG/DL (ref 37–145)
LYMPHOCYTES ABSOLUTE: 1.6 K/CU MM
LYMPHOCYTES RELATIVE PERCENT: 18.3 % (ref 24–44)
MCH RBC QN AUTO: 33.1 PG (ref 27–31)
MCHC RBC AUTO-ENTMCNC: 32.2 % (ref 32–36)
MCV RBC AUTO: 102.7 FL (ref 78–100)
MONOCYTES ABSOLUTE: 0.3 K/CU MM
MONOCYTES RELATIVE PERCENT: 3 % (ref 0–4)
PCT TRANSFERRIN: 18 % (ref 10–44)
PDW BLD-RTO: 23.3 % (ref 11.7–14.9)
PLATELET # BLD: 302 K/CU MM (ref 140–440)
PMV BLD AUTO: 9.4 FL (ref 7.5–11.1)
POTASSIUM SERPL-SCNC: 4.6 MMOL/L (ref 3.5–5.1)
RBC # BLD: 4.81 M/CU MM (ref 4.2–5.4)
SEGMENTED NEUTROPHILS ABSOLUTE COUNT: 6.6 K/CU MM
SEGMENTED NEUTROPHILS RELATIVE PERCENT: 74.5 % (ref 36–66)
SODIUM BLD-SCNC: 141 MMOL/L (ref 135–145)
TOTAL IRON BINDING CAPACITY: 245 UG/DL (ref 250–450)
TOTAL PROTEIN: 6.2 GM/DL (ref 6.4–8.2)
UNSATURATED IRON BINDING CAPACITY: 201 UG/DL (ref 110–370)
VITAMIN B-12: 711.3 PG/ML (ref 211–911)
WBC # BLD: 8.9 K/CU MM (ref 4–10.5)

## 2021-06-18 PROCEDURE — 80053 COMPREHEN METABOLIC PANEL: CPT

## 2021-06-18 PROCEDURE — 82746 ASSAY OF FOLIC ACID SERUM: CPT

## 2021-06-18 PROCEDURE — 85025 COMPLETE CBC W/AUTO DIFF WBC: CPT

## 2021-06-18 PROCEDURE — 82728 ASSAY OF FERRITIN: CPT

## 2021-06-18 PROCEDURE — 83550 IRON BINDING TEST: CPT

## 2021-06-18 PROCEDURE — 83540 ASSAY OF IRON: CPT

## 2021-06-18 PROCEDURE — 82607 VITAMIN B-12: CPT

## 2021-06-18 PROCEDURE — 36415 COLL VENOUS BLD VENIPUNCTURE: CPT

## 2021-06-25 ENCOUNTER — OFFICE VISIT (OUTPATIENT)
Dept: ONCOLOGY | Age: 71
End: 2021-06-25
Payer: MEDICARE

## 2021-06-25 ENCOUNTER — HOSPITAL ENCOUNTER (OUTPATIENT)
Dept: INFUSION THERAPY | Age: 71
Discharge: HOME | End: 2021-06-25
Payer: MEDICARE

## 2021-06-25 VITALS
DIASTOLIC BLOOD PRESSURE: 75 MMHG | TEMPERATURE: 96.9 F | HEIGHT: 63 IN | HEART RATE: 72 BPM | SYSTOLIC BLOOD PRESSURE: 140 MMHG | RESPIRATION RATE: 16 BRPM | BODY MASS INDEX: 28.7 KG/M2 | WEIGHT: 162 LBS | OXYGEN SATURATION: 96 %

## 2021-06-25 DIAGNOSIS — D50.9 IRON DEFICIENCY ANEMIA, UNSPECIFIED IRON DEFICIENCY ANEMIA TYPE: ICD-10-CM

## 2021-06-25 DIAGNOSIS — D69.6 THROMBOCYTOPENIA, UNSPECIFIED (HCC): ICD-10-CM

## 2021-06-25 DIAGNOSIS — K90.9 INTESTINAL MALABSORPTION, UNSPECIFIED TYPE: ICD-10-CM

## 2021-06-25 DIAGNOSIS — R60.0 LOWER EXTREMITY EDEMA: ICD-10-CM

## 2021-06-25 DIAGNOSIS — D45 POLYCYTHEMIA VERA (HCC): ICD-10-CM

## 2021-06-25 DIAGNOSIS — C44.92 SQUAMOUS CELL CARCINOMA OF SKIN, UNSPECIFIED: Primary | ICD-10-CM

## 2021-06-25 PROCEDURE — 1090F PRES/ABSN URINE INCON ASSESS: CPT | Performed by: INTERNAL MEDICINE

## 2021-06-25 PROCEDURE — G8427 DOCREV CUR MEDS BY ELIG CLIN: HCPCS | Performed by: INTERNAL MEDICINE

## 2021-06-25 PROCEDURE — 4040F PNEUMOC VAC/ADMIN/RCVD: CPT | Performed by: INTERNAL MEDICINE

## 2021-06-25 PROCEDURE — 1036F TOBACCO NON-USER: CPT | Performed by: INTERNAL MEDICINE

## 2021-06-25 PROCEDURE — 1123F ACP DISCUSS/DSCN MKR DOCD: CPT | Performed by: INTERNAL MEDICINE

## 2021-06-25 PROCEDURE — G8400 PT W/DXA NO RESULTS DOC: HCPCS | Performed by: INTERNAL MEDICINE

## 2021-06-25 PROCEDURE — 99214 OFFICE O/P EST MOD 30 MIN: CPT | Performed by: INTERNAL MEDICINE

## 2021-06-25 PROCEDURE — G8417 CALC BMI ABV UP PARAM F/U: HCPCS | Performed by: INTERNAL MEDICINE

## 2021-06-25 PROCEDURE — 99211 OFF/OP EST MAY X REQ PHY/QHP: CPT

## 2021-06-25 PROCEDURE — 3017F COLORECTAL CA SCREEN DOC REV: CPT | Performed by: INTERNAL MEDICINE

## 2021-06-25 RX ORDER — RIVAROXABAN 20 MG/1
1 TABLET, FILM COATED ORAL DAILY
COMMUNITY
Start: 2021-06-17

## 2021-06-25 RX ORDER — HYDROXYUREA 500 MG/1
500 CAPSULE ORAL DAILY
COMMUNITY
End: 2021-09-23 | Stop reason: SDUPTHER

## 2021-06-25 NOTE — PROGRESS NOTES
Patient Name: Sigrid Gunderson  Patient : 1950  Patient MRN: [id number]     Primary Oncologist: Duane Hughes MD  PCP: Dr Gerda Gibbs        Date of Service: 2021      Chief Complaint:   Chief Complaint   Patient presents with    Follow-up        Active Problem list  1. Squamous cell carcinoma of skin, unspecified    2. Polycythemia vera ([address]    3. Thrombocytopenia, unspecified ([address]    4. Iron deficiency anemia, unspecified iron deficiency anemia type    5. Intestinal malabsorption, unspecified type           HPI:        Referred in 2016 for polycythemia, CBC with wbc 13.6, Hb 14.4 hct 47.8, plt 513K  16 Jak2 postive  Was started on Hydrea as h/o MI needing CABG and also h/o TIA x 2, dose adjusted as needed. Then with SHAILA, had EGD, Cscope and VCE in may 2016  Cscope with diverticulosis and nonbleeding internal hemorrhoids  EGD with erythematous stomach but biopsy neg for h.pylori or malignancy  Capsule endoscopy with apthous ulcers in the small bowel. 18: Hydrea 1000mg OD    2018: Ct abdomen and pelvis:Impression  No renal or ureteral stone. No bladder stone. Distal left ureter does not completely opacify but otherwise appears  unremarkable. Otherwise no filling defect within the renal or ureteral  collecting systems. Mild irregularity along the posterior bladder wall which may be related to  ureterovesical junction bilaterally. Recommend further evaluation with  cystoscopy given hematuria.       19: CBC with wbc 11, Hb 20.2, hct 60, mcv 105, plt 171K  Creatinine 1.06, alk [id number]  ldh 296    3/12/19:CBC with wbc 11.9, Hb 15.6, hct 49.2, mcv 106, plt 384K  Ferritin 9  sats 7    2020 LDH of 149 CMP with sodium 138 potassium 4.6 glucose of 37 creatinine 1 calcium 10.3 ALT 8 AST 10 alk phos 115 CBC with WBC 11.7 hemoglobin 13.1 hematocrit 47.7 MCV of 101.7 platelets of 978      2020 EGD with Possible barrettes but biopsy negative     Had phlebotomy  and 2021 April ASA and hydrea were held sec to cytopenia    4/18/21: Presented to ER with left jaw swelling and pain. CT was done which revealed as below which started 4/17/21. No fever. No bleeding. 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  Parotid gland   appears normal.         PMH:   Polycythemia vera  Hypothyroidism  CAD  Lower back pain  HTN  HLP    PSH:  left rotator cuff repair 2017  Laminectomies x 2  cholecystectomy  hernia repair  removal of renal ca;culi  Total thyroidectomy  CABG  hemorrhoidectomy x 2  Hysterectomy  Lumpectomy left breast, non malignant  Eye lid surgery    Allergies: None    SH: Grand son lives with her. 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 FH: Brother has leukemia ?type. No other blood dyscrasias    Interval History  6/25/2021:Arrived alone to the clinic today. Overall feels good other than fatigue. . No new skin rash. Denied any fever, night sweats, lymphadenopathy. Lost 10 lbs, partly intentional.  Intermittent chest pain, is being followed by cardiology. Denied  increase shortness of breath, palpitations or dizziness. Denied any overt bleeding. Denied any abdominal pain, nausea, vomiting, diarrhea, constipation. Denied any lower extremity edema. Taking oral iron 3 a week. Takes hydrea 500mg every day. Review of Systems   Per interval history; otherwise 10 point ROS is negative              Vital Signs:  BP (!) 140/75 (Site: Right Upper Arm, Position: Sitting, Cuff Size: Medium Adult)   Pulse 72   Temp 96.9 °F (36.1 °C) (Infrared)   Resp 16   Ht 5' 3\" (1.6 m)   Wt 162 lb (73.5 kg)   SpO2 96%   BMI 28.70 kg/m²     Physical Exam:    CONSTITUTIONAL: awake, alert, overweight  EYES:No palor or icterus   ENT:NCAT  NECK: No JVD   HEMATOLOGIC/LYMPHATIC: no cervical, supraclavicular or axillary lymphadenopathy   LUNGS: CTAB, no wheeze   CARDIOVASCULAR:Sternal scar healed well. s1s2 rrr SM  ABDOMEN: soft ntnd bs pos, no hsm  NEUROLOGIC: GI   SKIN: AKs and echymosis, Scalp scar well-healed  EXTREMITIES: right LE edema      Labs:    Hematology:  Lab Results   Component Value Date    WBC 8.9 06/18/2021    RBC 4.81 06/18/2021    HGB 15.9 06/18/2021    HCT 49.4 (H) 06/18/2021    .7 (H) 06/18/2021    MCH 33.1 (H) 06/18/2021    MCHC 32.2 06/18/2021    RDW 23.3 (H) 06/18/2021     06/18/2021    MPV 9.4 06/18/2021    BANDSPCT 3 (L) 10/11/2019    SEGSPCT 74.5 (H) 06/18/2021    EOSRELPCT 3.4 (H) 06/18/2021    BASOPCT 0.8 06/18/2021    LYMPHOPCT 18.3 (L) 06/18/2021    MONOPCT 3.0 06/18/2021    BANDABS 0.38 10/11/2019    SEGSABS 6.6 06/18/2021    EOSABS 0.3 06/18/2021    BASOSABS 0.1 06/18/2021    LYMPHSABS 1.6 06/18/2021    MONOSABS 0.3 06/18/2021    DIFFTYPE AUTOMATED DIFFERENTIAL 06/18/2021    ANISOCYTOSIS 1+ 12/27/2019    POLYCHROM 1+ 12/27/2019    WBCMORP FEW  ATYPICAL LYMPH(S) NOTED   12/23/2019    PLTM RARE LARGE PLATELETS SEEN ON SMEAR 10/11/2019     No results found for: ESR    Chemistry:  Lab Results   Component Value Date     06/18/2021    K 4.6 06/18/2021     06/18/2021    CO2 27 06/18/2021    BUN 13 06/18/2021    CREATININE 1.0 06/18/2021    GLUCOSE 83 06/18/2021    CALCIUM 10.3 06/18/2021    PROT 6.2 (L) 06/18/2021    LABALBU 4.3 06/18/2021    BILITOT 0.3 06/18/2021    ALKPHOS 113 06/18/2021    AST 11 (L) 06/18/2021    ALT 8 (L) 06/18/2021    LABGLOM 55 (L) 06/18/2021    GFRAA >60 06/18/2021    MG 2.0 09/26/2015    POCCA 1.31 09/23/2015    POCGLU 104 (H) 09/25/2015     Lab Results   Component Value Date     05/03/2021     No components found for: LD  Lab Results   Component Value Date    TSHHS 2.720 05/06/2019    T4FREE 1.20 05/06/2019    FT3 2.8 11/08/2016       Immunology:  Lab Results   Component Value Date    PROT 6.2 (L) 06/18/2021     No results found for: Andreina Fish, KLFLCR  No results found for: B2M    Coagulation Panel:  Lab Results Component Value Date    PROTIME 11.0 (L) 09/17/2020    INR 0.91 09/17/2020    APTT 31.3 09/17/2020       Anemia Panel:  Lab Results   Component Value Date    KAUELAME97 711.3 06/18/2021    FOLATE 6.6 06/18/2021       Tumor Markers:  No results found for: , CEA, , LABCA2, PSA      Imaging: Reviewed     Pathology:Reviewed     Observations:  Performance Status: ECOG 0  Depression Status: No data recorded          Assessment & Plan:  PVera/Low ferritin: Low Erythropoetin and JAK2 + suggestive of polycythemia vera. Started Hydrea jan 2018, as H/O thrombosis(needing CABG) and also Possible TIA. leukocytosis in may 2019 was most probably sec to steroids, Flow at that point with left shift but otherwise normal.  CBC march 2021  with wbc 21.5, Hb 18.5, hct 61, mcv 78.4, platelet 208. Ferritin 27, sats 7% . Recommended Increasing hydrea to 1500mg OD, increase iron to PO bid and phlebotomy x 2. Continued ASA and adequate hydration. Note severe decline in wbc and platelet counts , consistent upon recheck CBC. Could be sec to increased hydrea dose, phlebotomy. Hydrea and ASA were held. No hemolysis. FLOW with no evidence of acute leukemia  Resumed Hydrea and low-dose aspirin once counts were normalized  Is currently on hydrea 500mg daily, continue the same dose as of June 2021. No need for phlebotomy     Low b12 Parenteral supplements     Left sided facial/jaw swelling: CT MF as above. Resolved. ? etiology. Iron indices and ferritin were suggestive of jeff. Recent GI eval normal , no overt bleeding. Recommend oral iron OD    RLE edema: Ultrasound ordered. Constipation: Recommend stool softener and laxatives as needed    Increased alk Po4: H/o cholecystectomy    Skin cancer ? Sq cell: Is followed by dermatology. Lipoma on the neck: Is being followed    Easy bruising: PT/PTT normal    Screening: Is uptodate on mammogram(2021 in [place] was normal)and colonoscopies.  Dexa scan 2019 normal. No further pap

## 2021-06-25 NOTE — PROGRESS NOTES
MA Rooming Questions  Patient: Ada Arguello  MRN: [id number]    Date: 6/25/2021        1. Do you have any new issues?   no         2. Do you need any refills on medications?    no    3. Have you had any imaging done since your last visit?   no    4. Have you been hospitalized or seen in the emergency room since your last visit here?   no    5. Did the patient have a depression screening completed today?  No    No data recorded     PHQ-9 Given to (if applicable):               PHQ-9 Score (if applicable):                     [] Positive     []  Negative              Does question #9 need addressed (if applicable)                     [] Yes    []  No               Nasra Liu CMA

## 2021-06-29 ENCOUNTER — HOSPITAL ENCOUNTER (OUTPATIENT)
Dept: ULTRASOUND IMAGING | Age: 71
Discharge: HOME | End: 2021-06-29
Payer: MEDICARE

## 2021-06-29 DIAGNOSIS — R60.0 LOWER EXTREMITY EDEMA: ICD-10-CM

## 2021-06-29 PROCEDURE — 93971 EXTREMITY STUDY: CPT

## 2021-07-06 ENCOUNTER — HOSPITAL ENCOUNTER (OUTPATIENT)
Dept: INFUSION THERAPY | Age: 71
Discharge: HOME | End: 2021-07-06
Payer: MEDICARE

## 2021-07-06 VITALS
SYSTOLIC BLOOD PRESSURE: 145 MMHG | RESPIRATION RATE: 16 BRPM | WEIGHT: 161.13 LBS | DIASTOLIC BLOOD PRESSURE: 76 MMHG | BODY MASS INDEX: 28.54 KG/M2 | HEART RATE: 83 BPM | TEMPERATURE: 97 F | OXYGEN SATURATION: 95 %

## 2021-07-06 DIAGNOSIS — E53.8 B12 DEFICIENCY: Primary | ICD-10-CM

## 2021-07-06 PROCEDURE — 96372 THER/PROPH/DIAG INJ SC/IM: CPT

## 2021-07-06 PROCEDURE — 6360000002 HC RX W HCPCS: Performed by: INTERNAL MEDICINE

## 2021-07-06 RX ORDER — CYANOCOBALAMIN 1000 UG/ML
1000 INJECTION INTRAMUSCULAR; SUBCUTANEOUS ONCE
Status: COMPLETED
Start: 2021-07-06 | End: 2021-07-06

## 2021-07-06 RX ORDER — CYANOCOBALAMIN 1000 UG/ML
1000 INJECTION INTRAMUSCULAR; SUBCUTANEOUS ONCE
Status: CANCELLED
Start: 2021-08-02

## 2021-07-06 RX ADMIN — CYANOCOBALAMIN 1000 MCG: 1000 INJECTION, SOLUTION INTRAMUSCULAR at 14:25

## 2021-07-06 NOTE — PROGRESS NOTES
Pt seen for B-12 injection, pt tolerated well. Pt denies changes or concerns at this time. Pt d/c in stable condition. No additional needs voiced at this time.

## 2021-08-02 ENCOUNTER — HOSPITAL ENCOUNTER (OUTPATIENT)
Dept: INFUSION THERAPY | Age: 71
Discharge: HOME | End: 2021-08-02
Payer: MEDICARE

## 2021-08-02 DIAGNOSIS — E53.8 B12 DEFICIENCY: Primary | ICD-10-CM

## 2021-08-02 PROCEDURE — 96372 THER/PROPH/DIAG INJ SC/IM: CPT

## 2021-08-02 PROCEDURE — 6360000002 HC RX W HCPCS: Performed by: INTERNAL MEDICINE

## 2021-08-02 RX ORDER — CYANOCOBALAMIN 1000 UG/ML
1000 INJECTION INTRAMUSCULAR; SUBCUTANEOUS ONCE
Status: CANCELLED
Start: 2021-08-03

## 2021-08-02 RX ORDER — CYANOCOBALAMIN 1000 UG/ML
1000 INJECTION INTRAMUSCULAR; SUBCUTANEOUS ONCE
Status: COMPLETED
Start: 2021-08-02 | End: 2021-08-02

## 2021-08-02 RX ADMIN — CYANOCOBALAMIN 1000 MCG: 1000 INJECTION, SOLUTION INTRAMUSCULAR at 14:43

## 2021-08-02 NOTE — PROGRESS NOTES
Ambulated to infusion area. No concerns voiced. B12 injection administered as ordered. Tolerated well.

## 2021-08-06 ENCOUNTER — HOSPITAL ENCOUNTER (OUTPATIENT)
Dept: INFUSION THERAPY | Age: 71
Discharge: HOME | End: 2021-08-06
Payer: MEDICARE

## 2021-08-06 ENCOUNTER — CLINICAL DOCUMENTATION (OUTPATIENT)
Dept: ONCOLOGY | Age: 71
End: 2021-08-06

## 2021-08-06 DIAGNOSIS — D45 POLYCYTHEMIA VERA (HCC): ICD-10-CM

## 2021-08-06 DIAGNOSIS — D50.9 IRON DEFICIENCY ANEMIA, UNSPECIFIED IRON DEFICIENCY ANEMIA TYPE: ICD-10-CM

## 2021-08-06 DIAGNOSIS — D45 POLYCYTHEMIA VERA (HCC): Primary | ICD-10-CM

## 2021-08-06 LAB
ALBUMIN SERPL-MCNC: 4.7 GM/DL (ref 3.4–5)
ALP BLD-CCNC: 136 IU/L (ref 40–129)
ALT SERPL-CCNC: 13 U/L (ref 10–40)
ANION GAP SERPL CALCULATED.3IONS-SCNC: 9 MMOL/L (ref 4–16)
AST SERPL-CCNC: 14 IU/L (ref 15–37)
BASOPHILS ABSOLUTE: 0.1 K/CU MM
BASOPHILS RELATIVE PERCENT: 0.7 % (ref 0–1)
BILIRUB SERPL-MCNC: 0.2 MG/DL (ref 0–1)
BUN BLDV-MCNC: 18 MG/DL (ref 6–23)
CALCIUM SERPL-MCNC: 10.4 MG/DL (ref 8.3–10.6)
CHLORIDE BLD-SCNC: 100 MMOL/L (ref 99–110)
CO2: 28 MMOL/L (ref 21–32)
CREAT SERPL-MCNC: 1 MG/DL (ref 0.6–1.1)
DIFFERENTIAL TYPE: ABNORMAL
EOSINOPHILS ABSOLUTE: 0.3 K/CU MM
EOSINOPHILS RELATIVE PERCENT: 3.5 % (ref 0–3)
FERRITIN: 37 NG/ML (ref 15–150)
FOLATE: 2.9 NG/ML (ref 3.1–17.5)
GFR AFRICAN AMERICAN: >60 ML/MIN/1.73M2
GFR NON-AFRICAN AMERICAN: 55 ML/MIN/1.73M2
GLUCOSE BLD-MCNC: 79 MG/DL (ref 70–99)
HCT VFR BLD CALC: 59.7 % (ref 37–47)
HEMOGLOBIN: 19.1 GM/DL (ref 12.5–16)
LACTATE DEHYDROGENASE: 190 IU/L (ref 120–246)
LYMPHOCYTES ABSOLUTE: 1.7 K/CU MM
LYMPHOCYTES RELATIVE PERCENT: 22.9 % (ref 24–44)
MCH RBC QN AUTO: 33.2 PG (ref 27–31)
MCHC RBC AUTO-ENTMCNC: 32 % (ref 32–36)
MCV RBC AUTO: 103.8 FL (ref 78–100)
MONOCYTES ABSOLUTE: 0.3 K/CU MM
MONOCYTES RELATIVE PERCENT: 3.8 % (ref 0–4)
PDW BLD-RTO: 14.2 % (ref 11.7–14.9)
PLATELET # BLD: 179 K/CU MM (ref 140–440)
PMV BLD AUTO: 9.8 FL (ref 7.5–11.1)
POTASSIUM SERPL-SCNC: 4.7 MMOL/L (ref 3.5–5.1)
RBC # BLD: 5.75 M/CU MM (ref 4.2–5.4)
SEGMENTED NEUTROPHILS ABSOLUTE COUNT: 5.1 K/CU MM
SEGMENTED NEUTROPHILS RELATIVE PERCENT: 69.1 % (ref 36–66)
SODIUM BLD-SCNC: 137 MMOL/L (ref 135–145)
TOTAL PROTEIN: 6.9 GM/DL (ref 6.4–8.2)
VITAMIN B-12: 939 PG/ML (ref 211–911)
WBC # BLD: 7.4 K/CU MM (ref 4–10.5)

## 2021-08-06 PROCEDURE — 85025 COMPLETE CBC W/AUTO DIFF WBC: CPT

## 2021-08-06 PROCEDURE — 83615 LACTATE (LD) (LDH) ENZYME: CPT

## 2021-08-06 PROCEDURE — 82728 ASSAY OF FERRITIN: CPT

## 2021-08-06 PROCEDURE — 82746 ASSAY OF FOLIC ACID SERUM: CPT

## 2021-08-06 PROCEDURE — 36415 COLL VENOUS BLD VENIPUNCTURE: CPT

## 2021-08-06 PROCEDURE — 80053 COMPREHEN METABOLIC PANEL: CPT

## 2021-08-06 PROCEDURE — 82607 VITAMIN B-12: CPT

## 2021-08-06 RX ORDER — 0.9 % SODIUM CHLORIDE 0.9 %
250 INTRAVENOUS SOLUTION INTRAVENOUS ONCE
Status: CANCELLED | OUTPATIENT
Start: 2021-08-06 | End: 2021-08-06

## 2021-08-06 RX ORDER — 0.9 % SODIUM CHLORIDE 0.9 %
500 INTRAVENOUS SOLUTION INTRAVENOUS ONCE
Status: CANCELLED | OUTPATIENT
Start: 2021-08-06 | End: 2021-08-06

## 2021-08-10 ENCOUNTER — TELEPHONE (OUTPATIENT)
Dept: ONCOLOGY | Age: 71
End: 2021-08-10

## 2021-08-10 DIAGNOSIS — D45 POLYCYTHEMIA VERA (HCC): Primary | ICD-10-CM

## 2021-08-10 NOTE — TELEPHONE ENCOUNTER
Fernandez Levi from infusion called and indicated CBC's  with directions for phlebotomy need to be added to the phlebotomy therapy plan.

## 2021-08-16 ENCOUNTER — HOSPITAL ENCOUNTER (OUTPATIENT)
Dept: INFUSION THERAPY | Age: 71
Setting detail: INFUSION SERIES
Discharge: HOME | End: 2021-08-16
Payer: MEDICARE

## 2021-08-16 VITALS
SYSTOLIC BLOOD PRESSURE: 126 MMHG | HEART RATE: 84 BPM | OXYGEN SATURATION: 93 % | DIASTOLIC BLOOD PRESSURE: 70 MMHG | RESPIRATION RATE: 16 BRPM | TEMPERATURE: 97.3 F

## 2021-08-16 DIAGNOSIS — D45 POLYCYTHEMIA VERA (HCC): Primary | ICD-10-CM

## 2021-08-16 PROCEDURE — 99211 OFF/OP EST MAY X REQ PHY/QHP: CPT

## 2021-08-16 PROCEDURE — 99195 PHLEBOTOMY: CPT

## 2021-08-16 NOTE — PROGRESS NOTES
Diagnosis: POLYCYTHEMIA VERA    Pre-Phlebotomy: /82, HR 77    Post-Phlebotomy: /93, HR 84    Volume Removed: 859 grams    Complications: NONE    Comments: TOLERATED PHLEBOTOMY WELL.             LOT# [id number]    Exp: 5-23      Kristie Locke RN

## 2021-08-30 ENCOUNTER — HOSPITAL ENCOUNTER (OUTPATIENT)
Dept: INFUSION THERAPY | Age: 71
Setting detail: INFUSION SERIES
Discharge: HOME | End: 2021-08-30
Payer: MEDICARE

## 2021-08-30 VITALS
SYSTOLIC BLOOD PRESSURE: 116 MMHG | OXYGEN SATURATION: 92 % | HEART RATE: 78 BPM | DIASTOLIC BLOOD PRESSURE: 81 MMHG | RESPIRATION RATE: 16 BRPM

## 2021-08-30 DIAGNOSIS — D45 POLYCYTHEMIA VERA (HCC): Primary | ICD-10-CM

## 2021-08-30 LAB
HCT VFR BLD CALC: 61.4 % (ref 37–47)
HEMOGLOBIN: 19.1 GM/DL (ref 12.5–16)
MCH RBC QN AUTO: 32.2 PG (ref 27–31)
MCHC RBC AUTO-ENTMCNC: 31.1 % (ref 32–36)
MCV RBC AUTO: 103.5 FL (ref 78–100)
PDW BLD-RTO: 13.8 % (ref 11.7–14.9)
PLATELET # BLD: 197 K/CU MM (ref 140–440)
PMV BLD AUTO: 9.7 FL (ref 7.5–11.1)
RBC # BLD: 5.93 M/CU MM (ref 4.2–5.4)
WBC # BLD: 6.9 K/CU MM (ref 4–10.5)

## 2021-08-30 PROCEDURE — 85027 COMPLETE CBC AUTOMATED: CPT

## 2021-08-30 PROCEDURE — 99211 OFF/OP EST MAY X REQ PHY/QHP: CPT

## 2021-08-30 PROCEDURE — 99195 PHLEBOTOMY: CPT

## 2021-08-30 NOTE — PROGRESS NOTES
Diagnosis: Polycythemia Vera    Pre-Phlebotomy: /83, HR 77    Post-Phlebotomy: /86, HR 77    Volume Removed: 049 grams    Complications: None    Comments:  Tolerated Phlebotomy well            LOT# [id number]    Exp: 5-23      Min Bustamante RN

## 2021-09-07 ENCOUNTER — HOSPITAL ENCOUNTER (OUTPATIENT)
Dept: INFUSION THERAPY | Age: 71
Discharge: HOME | End: 2021-09-07
Payer: MEDICARE

## 2021-09-07 DIAGNOSIS — E53.8 B12 DEFICIENCY: Primary | ICD-10-CM

## 2021-09-07 PROCEDURE — 6360000002 HC RX W HCPCS

## 2021-09-07 PROCEDURE — 96372 THER/PROPH/DIAG INJ SC/IM: CPT

## 2021-09-07 RX ORDER — CYANOCOBALAMIN 1000 UG/ML
1000 INJECTION INTRAMUSCULAR; SUBCUTANEOUS ONCE
Status: CANCELLED
Start: 2021-09-28

## 2021-09-07 RX ORDER — CYANOCOBALAMIN 1000 UG/ML
INJECTION INTRAMUSCULAR; SUBCUTANEOUS
Status: COMPLETED
Start: 2021-09-07 | End: 2021-09-07

## 2021-09-07 RX ORDER — CYANOCOBALAMIN 1000 UG/ML
1000 INJECTION INTRAMUSCULAR; SUBCUTANEOUS ONCE
Status: COMPLETED
Start: 2021-09-07 | End: 2021-09-07

## 2021-09-07 RX ADMIN — CYANOCOBALAMIN 1000 MCG: 1000 INJECTION INTRAMUSCULAR; SUBCUTANEOUS at 14:49

## 2021-09-07 RX ADMIN — CYANOCOBALAMIN 1000 MCG: 1000 INJECTION, SOLUTION INTRAMUSCULAR at 14:49

## 2021-09-13 ENCOUNTER — HOSPITAL ENCOUNTER (OUTPATIENT)
Dept: INFUSION THERAPY | Age: 71
Setting detail: INFUSION SERIES
Discharge: HOME | End: 2021-09-13
Payer: MEDICARE

## 2021-09-13 VITALS
TEMPERATURE: 97.2 F | SYSTOLIC BLOOD PRESSURE: 116 MMHG | RESPIRATION RATE: 16 BRPM | HEART RATE: 80 BPM | DIASTOLIC BLOOD PRESSURE: 74 MMHG

## 2021-09-13 DIAGNOSIS — D45 POLYCYTHEMIA VERA (HCC): Primary | ICD-10-CM

## 2021-09-13 LAB
HCT VFR BLD CALC: 56.4 % (ref 37–47)
HEMOGLOBIN: 17.5 GM/DL (ref 12.5–16)
MCH RBC QN AUTO: 31.5 PG (ref 27–31)
MCHC RBC AUTO-ENTMCNC: 31 % (ref 32–36)
MCV RBC AUTO: 101.6 FL (ref 78–100)
PDW BLD-RTO: 14.3 % (ref 11.7–14.9)
PLATELET # BLD: 201 K/CU MM (ref 140–440)
PMV BLD AUTO: 10 FL (ref 7.5–11.1)
RBC # BLD: 5.55 M/CU MM (ref 4.2–5.4)
WBC # BLD: 7.8 K/CU MM (ref 4–10.5)

## 2021-09-13 PROCEDURE — 85027 COMPLETE CBC AUTOMATED: CPT

## 2021-09-13 PROCEDURE — 99195 PHLEBOTOMY: CPT

## 2021-09-13 PROCEDURE — 99211 OFF/OP EST MAY X REQ PHY/QHP: CPT

## 2021-09-20 ENCOUNTER — HOSPITAL ENCOUNTER (OUTPATIENT)
Dept: INFUSION THERAPY | Age: 71
Discharge: HOME | End: 2021-09-20
Payer: MEDICARE

## 2021-09-20 DIAGNOSIS — D45 POLYCYTHEMIA VERA (HCC): ICD-10-CM

## 2021-09-20 DIAGNOSIS — D50.9 IRON DEFICIENCY ANEMIA, UNSPECIFIED IRON DEFICIENCY ANEMIA TYPE: ICD-10-CM

## 2021-09-20 LAB
BASOPHILS ABSOLUTE: 0.1 K/CU MM
BASOPHILS RELATIVE PERCENT: 1 % (ref 0–1)
DIFFERENTIAL TYPE: ABNORMAL
EOSINOPHILS ABSOLUTE: 0.2 K/CU MM
EOSINOPHILS RELATIVE PERCENT: 3 % (ref 0–3)
FERRITIN: 53 NG/ML (ref 15–150)
HCT VFR BLD CALC: 50.9 % (ref 37–47)
HEMOGLOBIN: 16.2 GM/DL (ref 12.5–16)
LYMPHOCYTES ABSOLUTE: 1.5 K/CU MM
LYMPHOCYTES RELATIVE PERCENT: 24.9 % (ref 24–44)
MCH RBC QN AUTO: 31.7 PG (ref 27–31)
MCHC RBC AUTO-ENTMCNC: 31.8 % (ref 32–36)
MCV RBC AUTO: 99.6 FL (ref 78–100)
MONOCYTES ABSOLUTE: 0.2 K/CU MM
MONOCYTES RELATIVE PERCENT: 2.7 % (ref 0–4)
PDW BLD-RTO: 16.3 % (ref 11.7–14.9)
PLATELET # BLD: 128 K/CU MM (ref 140–440)
PMV BLD AUTO: 9.6 FL (ref 7.5–11.1)
RBC # BLD: 5.11 M/CU MM (ref 4.2–5.4)
SEGMENTED NEUTROPHILS ABSOLUTE COUNT: 4.1 K/CU MM
SEGMENTED NEUTROPHILS RELATIVE PERCENT: 68.4 % (ref 36–66)
WBC # BLD: 5.9 K/CU MM (ref 4–10.5)

## 2021-09-20 PROCEDURE — 82728 ASSAY OF FERRITIN: CPT

## 2021-09-20 PROCEDURE — 85025 COMPLETE CBC W/AUTO DIFF WBC: CPT

## 2021-09-20 PROCEDURE — 36415 COLL VENOUS BLD VENIPUNCTURE: CPT

## 2021-09-23 ENCOUNTER — HOSPITAL ENCOUNTER (OUTPATIENT)
Dept: INFUSION THERAPY | Age: 71
Discharge: HOME | End: 2021-09-23
Payer: MEDICARE

## 2021-09-23 ENCOUNTER — OFFICE VISIT (OUTPATIENT)
Dept: ONCOLOGY | Age: 71
End: 2021-09-23
Payer: MEDICARE

## 2021-09-23 VITALS
RESPIRATION RATE: 16 BRPM | HEART RATE: 77 BPM | TEMPERATURE: 97.9 F | HEIGHT: 63 IN | WEIGHT: 166.6 LBS | DIASTOLIC BLOOD PRESSURE: 80 MMHG | SYSTOLIC BLOOD PRESSURE: 145 MMHG | OXYGEN SATURATION: 96 % | BODY MASS INDEX: 29.52 KG/M2

## 2021-09-23 DIAGNOSIS — D45 POLYCYTHEMIA VERA (HCC): Primary | ICD-10-CM

## 2021-09-23 DIAGNOSIS — D69.6 THROMBOCYTOPENIA, UNSPECIFIED (HCC): ICD-10-CM

## 2021-09-23 DIAGNOSIS — D50.9 IRON DEFICIENCY ANEMIA, UNSPECIFIED IRON DEFICIENCY ANEMIA TYPE: ICD-10-CM

## 2021-09-23 DIAGNOSIS — K90.9 INTESTINAL MALABSORPTION, UNSPECIFIED TYPE: ICD-10-CM

## 2021-09-23 DIAGNOSIS — C44.92 SQUAMOUS CELL CARCINOMA OF SKIN, UNSPECIFIED: ICD-10-CM

## 2021-09-23 PROCEDURE — 1123F ACP DISCUSS/DSCN MKR DOCD: CPT | Performed by: INTERNAL MEDICINE

## 2021-09-23 PROCEDURE — 3017F COLORECTAL CA SCREEN DOC REV: CPT | Performed by: INTERNAL MEDICINE

## 2021-09-23 PROCEDURE — G8417 CALC BMI ABV UP PARAM F/U: HCPCS | Performed by: INTERNAL MEDICINE

## 2021-09-23 PROCEDURE — 99214 OFFICE O/P EST MOD 30 MIN: CPT | Performed by: INTERNAL MEDICINE

## 2021-09-23 PROCEDURE — G8428 CUR MEDS NOT DOCUMENT: HCPCS | Performed by: INTERNAL MEDICINE

## 2021-09-23 PROCEDURE — 1036F TOBACCO NON-USER: CPT | Performed by: INTERNAL MEDICINE

## 2021-09-23 PROCEDURE — 4040F PNEUMOC VAC/ADMIN/RCVD: CPT | Performed by: INTERNAL MEDICINE

## 2021-09-23 PROCEDURE — G8400 PT W/DXA NO RESULTS DOC: HCPCS | Performed by: INTERNAL MEDICINE

## 2021-09-23 PROCEDURE — 99211 OFF/OP EST MAY X REQ PHY/QHP: CPT

## 2021-09-23 PROCEDURE — 1090F PRES/ABSN URINE INCON ASSESS: CPT | Performed by: INTERNAL MEDICINE

## 2021-09-23 RX ORDER — HYDROXYUREA 500 MG/1
1000 CAPSULE ORAL DAILY
Qty: 180 CAPSULE | Refills: 3 | Status: SHIPPED | OUTPATIENT
Start: 2021-09-23 | End: 2021-12-07 | Stop reason: SDUPTHER

## 2021-09-23 ASSESSMENT — PATIENT HEALTH QUESTIONNAIRE - PHQ9
SUM OF ALL RESPONSES TO PHQ9 QUESTIONS 1 & 2: 0
2. FEELING DOWN, DEPRESSED OR HOPELESS: 0
1. LITTLE INTEREST OR PLEASURE IN DOING THINGS: 0
SUM OF ALL RESPONSES TO PHQ QUESTIONS 1-9: 0

## 2021-09-23 NOTE — PROGRESS NOTES
Patient Name: Lobo Kidd  Patient : 1950  Patient MRN: [id number]     Primary Oncologist: Ariana Webster MD  PCP: Dr Leeroy Kirk        Date of Service: 2021      Chief Complaint:   Chief Complaint   Patient presents with    Discuss Labs        Active Problem list  1. Polycythemia vera ([facility]    2. Squamous cell carcinoma of skin, unspecified    3. Thrombocytopenia, unspecified ([facility]    4. Iron deficiency anemia, unspecified iron deficiency anemia type    5. Intestinal malabsorption, unspecified type           HPI:        Referred in 2016 for polycythemia, CBC with wbc 13.6, Hb 14.4 hct 47.8, plt 513K  16 Jak2 postive  Was started on Hydrea as h/o MI needing CABG and also h/o TIA x 2, dose adjusted as needed. Then with SHAILA, had EGD, Cscope and VCE in may 2016  Cscope with diverticulosis and nonbleeding internal hemorrhoids  EGD with erythematous stomach but biopsy neg for h.pylori or malignancy  Capsule endoscopy with apthous ulcers in the small bowel. 18: Hydrea 1000mg OD    2018: Ct abdomen and pelvis:Impression  No renal or ureteral stone. No bladder stone. Distal left ureter does not completely opacify but otherwise appears  unremarkable. Otherwise no filling defect within the renal or ureteral  collecting systems. Mild irregularity along the posterior bladder wall which may be related to  ureterovesical junction bilaterally. Recommend further evaluation with  cystoscopy given hematuria.       19: CBC with wbc 11, Hb 20.2, hct 60, mcv 105, plt 171K  Creatinine 1.06, alk oc3269  ldh 296    3/12/19:CBC with wbc 11.9, Hb 15.6, hct 49.2, mcv 106, plt 384K  Ferritin 9  sats 7    2020 LDH of 149 CMP with sodium 138 potassium 4.6 glucose of 37 creatinine 1 calcium 10.3 ALT 8 AST 10 alk phos 115 CBC with WBC 11.7 hemoglobin 13.1 hematocrit 47.7 MCV of 101.7 platelets of 964      2020 EGD with Possible barrettes but biopsy negative     Had phlebotomy  and 2021 April ASA and hydrea were held sec to cytopenia    4/18/21: Presented to ER with left jaw swelling and pain,which started 4/17/21. No fever. No bleeding. . 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  Parotid gland   appears normal.     6/29/2021 no DVT in the right lower extremity    9/20/2021 CBC with hemoglobin of 16.2 hematocrit of 50.9 platelets of 056, ferritin of 53    PMH:   Polycythemia vera  Hypothyroidism  CAD  Lower back pain  HTN  HLP    PSH:  left rotator cuff repair 2017  Laminectomies x 2  cholecystectomy  hernia repair  removal of renal ca;culi  Total thyroidectomy  CABG  hemorrhoidectomy x 2  Hysterectomy  Lumpectomy left breast, non malignant  Eye lid surgery    Allergies: None    SH: Grand son lives with her. 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 FH: Brother has leukemia ?type. No other blood dyscrasias    Interval History  9/23/2021:Arrived alone to the clinic today. Reported that she continues to be tired. Reported that she had basal cell carcinoma resected over the upper lip on the left side. . Denied any fever, night sweats, lymphadenopathy. No further weight loss per her intermittent chest pain, is being followed by cardiology. Denied  increase shortness of breath, palpitations or dizziness. Denied any overt bleeding. Denied any abdominal pain, nausea, vomiting, diarrhea, constipation. Denied any lower extremity edema. Has been taking iron 1 twice daily, 1000 mg of hydroxyurea. And also on B12 injections.       Review of Systems   Per interval history; otherwise 10 point ROS is negative              Vital Signs:  BP (!) 145/80 (Site: Right Upper Arm, Position: Sitting, Cuff Size: Large Adult)   Pulse 77   Temp 97.9 °F (36.6 °C) (Infrared)   Resp 16   Ht 5' 3\" (1.6 m)   Wt 166 lb 9.6 oz (75.6 kg)   SpO2 96%   BMI 29.51 kg/m²     Physical Exam:    CONSTITUTIONAL: awake, alert, overweight  EYES:No palor or icterus   ENT:NCAT  NECK: No JVD   HEMATOLOGIC/LYMPHATIC: no cervical, supraclavicular or axillary lymphadenopathy   LUNGS: CTAB, no wheeze   CARDIOVASCULAR:Sternal scar healed well. s1s2 rrr SM  ABDOMEN: soft ntnd bs pos, no hsm  NEUROLOGIC: GI   SKIN: AKs and echymosis, Scalp scar well-healed  EXTREMITIES: right LE edema      Labs:    Hematology:  Lab Results   Component Value Date    WBC 5.9 09/20/2021    RBC 5.11 09/20/2021    HGB 16.2 (H) 09/20/2021    HCT 50.9 (H) 09/20/2021    MCV 99.6 09/20/2021    MCH 31.7 (H) 09/20/2021    MCHC 31.8 (L) 09/20/2021    RDW 16.3 (H) 09/20/2021     (L) 09/20/2021    MPV 9.6 09/20/2021    BANDSPCT 3 (L) 10/11/2019    SEGSPCT 68.4 (H) 09/20/2021    EOSRELPCT 3.0 09/20/2021    BASOPCT 1.0 09/20/2021    LYMPHOPCT 24.9 09/20/2021    MONOPCT 2.7 09/20/2021    BANDABS 0.38 10/11/2019    SEGSABS 4.1 09/20/2021    EOSABS 0.2 09/20/2021    BASOSABS 0.1 09/20/2021    LYMPHSABS 1.5 09/20/2021    MONOSABS 0.2 09/20/2021    DIFFTYPE AUTOMATED DIFFERENTIAL 09/20/2021    ANISOCYTOSIS 1+ 12/27/2019    POLYCHROM 1+ 12/27/2019    WBCMORP FEW  ATYPICAL LYMPH(S) NOTED   12/23/2019    PLTM RARE LARGE PLATELETS SEEN ON SMEAR 10/11/2019     No results found for: ESR    Chemistry:  Lab Results   Component Value Date     08/06/2021    K 4.7 08/06/2021     08/06/2021    CO2 28 08/06/2021    BUN 18 08/06/2021    CREATININE 1.0 08/06/2021    GLUCOSE 79 08/06/2021    CALCIUM 10.4 08/06/2021    PROT 6.9 08/06/2021    LABALBU 4.7 08/06/2021    BILITOT 0.2 08/06/2021    ALKPHOS 136 (H) 08/06/2021    AST 14 (L) 08/06/2021    ALT 13 08/06/2021    LABGLOM 55 (L) 08/06/2021    GFRAA >60 08/06/2021    MG 2.0 09/26/2015    POCCA 1.31 09/23/2015    POCGLU 104 (H) 09/25/2015     Lab Results   Component Value Date     08/06/2021     No components found for: LD  Lab Results   Component Value Date    TSHHS 2.720 05/06/2019 T4FREE 1.20 05/06/2019    FT3 2.8 11/08/2016       Immunology:  Lab Results   Component Value Date    PROT 6.9 08/06/2021     No results found for: Krystina Crockett, KLFLCR  No results found for: B2M    Coagulation Panel:  Lab Results   Component Value Date    PROTIME 11.0 (L) 09/17/2020    INR 0.91 09/17/2020    APTT 31.3 09/17/2020       Anemia Panel:  Lab Results   Component Value Date    Yessenia Buchanan 939.0 (H) 08/06/2021    FOLATE 2.9 (L) 08/06/2021       Tumor Markers:  No results found for: , CEA, , LABCA2, PSA      Imaging: Reviewed     Pathology:Reviewed     Observations:  Performance Status: ECOG 0  Depression Status: PHQ-9 Total Score: 0 (9/23/2021 10:49 AM)          Assessment & Plan:  PVera/Low ferritin: Low Erythropoetin and JAK2 + suggestive of polycythemia vera. Started Hydrea jan 2018, as H/O thrombosis(needing CABG) and also Possible TIA. leukocytosis in may 2019 was most probably sec to steroids, Flow at that point with left shift but otherwise normal.  CBC march 2021  with wbc 21.5, Hb 18.5, hct 61, mcv 78.4, platelet 269. Ferritin 27, sats 7% . Recommended Increasing hydrea to 1500mg OD, increase iron to PO bid and phlebotomy x 2. Continued ASA and adequate hydration. Note severe decline in wbc and platelet counts , consistent upon recheck CBC. Could be sec to increased hydrea dose, phlebotomy. Hydrea and ASA were held. No hemolysis. FLOW with no evidence of acute leukemia  Resumed Hydrea and low-dose aspirin once counts were normalized, but aspirin was discontinued by cardiology. Is currently on hydrea 1000 mg daily mg daily, continue the same dose as of September 0919, recommend folic acid supplementation. No need for phlebotomy currently. No thrombocytopenia, adjust doses accordingly and will monitor the counts closely    Low b12: Adequate supplementation with parenteral B12, may continue only oral supplementation. Left sided facial/jaw swelling: CT MF as above. Resolved.

## 2021-09-23 NOTE — PROGRESS NOTES
MA Rooming Questions  Patient: Poppy Mendez  MRN: [id number]    Date: 9/23/2021        1. Do you have any new issues?   no         2. Do you need any refills on medications? yes - hydrea    3. Have you had any imaging done since your last visit?   no    4. Have you been hospitalized or seen in the emergency room since your last visit here?   no    5. Did the patient have a depression screening completed today?  Yes    PHQ-9 Total Score: 0 (9/23/2021 10:49 AM)       PHQ-9 Given to (if applicable):               PHQ-9 Score (if applicable):                     [] Positive     []  Negative              Does question #9 need addressed (if applicable)                     [] Yes    []  No               Jovana Mercado CMA

## 2021-10-04 ENCOUNTER — APPOINTMENT (OUTPATIENT)
Dept: CT IMAGING | Age: 71
End: 2021-10-04
Payer: MEDICARE

## 2021-10-04 ENCOUNTER — APPOINTMENT (OUTPATIENT)
Dept: GENERAL RADIOLOGY | Age: 71
End: 2021-10-04
Payer: MEDICARE

## 2021-10-04 ENCOUNTER — HOSPITAL ENCOUNTER (EMERGENCY)
Age: 71
Discharge: HOME | End: 2021-10-04
Attending: EMERGENCY MEDICINE
Payer: MEDICARE

## 2021-10-04 VITALS
HEIGHT: 63 IN | WEIGHT: 150 LBS | RESPIRATION RATE: 18 BRPM | OXYGEN SATURATION: 95 % | BODY MASS INDEX: 26.58 KG/M2 | SYSTOLIC BLOOD PRESSURE: 135 MMHG | HEART RATE: 77 BPM | TEMPERATURE: 97.6 F | DIASTOLIC BLOOD PRESSURE: 75 MMHG

## 2021-10-04 DIAGNOSIS — G45.9 TIA (TRANSIENT ISCHEMIC ATTACK): ICD-10-CM

## 2021-10-04 DIAGNOSIS — R47.81 SLURRED SPEECH: Primary | ICD-10-CM

## 2021-10-04 DIAGNOSIS — R41.0 CONFUSION: ICD-10-CM

## 2021-10-04 LAB
ALBUMIN SERPL-MCNC: 4 GM/DL (ref 3.4–5)
ALP BLD-CCNC: 179 IU/L (ref 40–129)
ALT SERPL-CCNC: 20 U/L (ref 10–40)
ANION GAP SERPL CALCULATED.3IONS-SCNC: 11 MMOL/L (ref 4–16)
AST SERPL-CCNC: 20 IU/L (ref 15–37)
BASOPHILS ABSOLUTE: 0 K/CU MM
BASOPHILS RELATIVE PERCENT: 0.4 % (ref 0–1)
BILIRUB SERPL-MCNC: 0.5 MG/DL (ref 0–1)
BUN BLDV-MCNC: 15 MG/DL (ref 6–23)
CALCIUM SERPL-MCNC: 11.3 MG/DL (ref 8.3–10.6)
CHLORIDE BLD-SCNC: 99 MMOL/L (ref 99–110)
CO2: 25 MMOL/L (ref 21–32)
CREAT SERPL-MCNC: 0.9 MG/DL (ref 0.6–1.1)
DIFFERENTIAL TYPE: ABNORMAL
EKG ATRIAL RATE: 74 BPM
EKG DIAGNOSIS: NORMAL
EKG P AXIS: 51 DEGREES
EKG P-R INTERVAL: 148 MS
EKG Q-T INTERVAL: 364 MS
EKG QRS DURATION: 86 MS
EKG QTC CALCULATION (BAZETT): 404 MS
EKG R AXIS: 11 DEGREES
EKG T AXIS: 56 DEGREES
EKG VENTRICULAR RATE: 74 BPM
EOSINOPHILS ABSOLUTE: 0.2 K/CU MM
EOSINOPHILS RELATIVE PERCENT: 2.5 % (ref 0–3)
GFR AFRICAN AMERICAN: >60 ML/MIN/1.73M2
GFR NON-AFRICAN AMERICAN: >60 ML/MIN/1.73M2
GLUCOSE BLD-MCNC: 101 MG/DL (ref 70–99)
GLUCOSE BLD-MCNC: 108 MG/DL
GLUCOSE BLD-MCNC: 108 MG/DL (ref 70–99)
HCT VFR BLD CALC: 52.1 % (ref 37–47)
HEMOGLOBIN: 16.3 GM/DL (ref 12.5–16)
IMMATURE NEUTROPHIL %: 0.7 % (ref 0–0.43)
INR BLD: 1.76 INDEX
LYMPHOCYTES ABSOLUTE: 1.6 K/CU MM
LYMPHOCYTES RELATIVE PERCENT: 19.2 % (ref 24–44)
MCH RBC QN AUTO: 31.1 PG (ref 27–31)
MCHC RBC AUTO-ENTMCNC: 31.3 % (ref 32–36)
MCV RBC AUTO: 99.4 FL (ref 78–100)
MONOCYTES ABSOLUTE: 0.3 K/CU MM
MONOCYTES RELATIVE PERCENT: 3.3 % (ref 0–4)
PDW BLD-RTO: 17.8 % (ref 11.7–14.9)
PLATELET # BLD: 230 K/CU MM (ref 140–440)
PMV BLD AUTO: 9.8 FL (ref 7.5–11.1)
POTASSIUM SERPL-SCNC: 4.3 MMOL/L (ref 3.5–5.1)
PROTHROMBIN TIME: 20.2 SECONDS (ref 11.7–14.5)
RBC # BLD: 5.24 M/CU MM (ref 4.2–5.4)
SEGMENTED NEUTROPHILS ABSOLUTE COUNT: 6 K/CU MM
SEGMENTED NEUTROPHILS RELATIVE PERCENT: 73.9 % (ref 36–66)
SODIUM BLD-SCNC: 135 MMOL/L (ref 135–145)
TOTAL IMMATURE NEUTOROPHIL: 0.06 K/CU MM
TOTAL PROTEIN: 6.6 GM/DL (ref 6.4–8.2)
TROPONIN T: <0.01 NG/ML
WBC # BLD: 8.1 K/CU MM (ref 4–10.5)

## 2021-10-04 PROCEDURE — 85610 PROTHROMBIN TIME: CPT

## 2021-10-04 PROCEDURE — 70450 CT HEAD/BRAIN W/O DYE: CPT

## 2021-10-04 PROCEDURE — 70498 CT ANGIOGRAPHY NECK: CPT

## 2021-10-04 PROCEDURE — 84484 ASSAY OF TROPONIN QUANT: CPT

## 2021-10-04 PROCEDURE — 93010 ELECTROCARDIOGRAM REPORT: CPT | Performed by: INTERNAL MEDICINE

## 2021-10-04 PROCEDURE — 80053 COMPREHEN METABOLIC PANEL: CPT

## 2021-10-04 PROCEDURE — 71045 X-RAY EXAM CHEST 1 VIEW: CPT

## 2021-10-04 PROCEDURE — 93005 ELECTROCARDIOGRAM TRACING: CPT | Performed by: EMERGENCY MEDICINE

## 2021-10-04 PROCEDURE — 82962 GLUCOSE BLOOD TEST: CPT

## 2021-10-04 PROCEDURE — 85025 COMPLETE CBC W/AUTO DIFF WBC: CPT

## 2021-10-04 PROCEDURE — 99283 EMERGENCY DEPT VISIT LOW MDM: CPT

## 2021-10-04 PROCEDURE — 70496 CT ANGIOGRAPHY HEAD: CPT

## 2021-10-04 PROCEDURE — 6360000004 HC RX CONTRAST MEDICATION: Performed by: EMERGENCY MEDICINE

## 2021-10-04 RX ORDER — SODIUM CHLORIDE 9 MG/ML
25 INJECTION, SOLUTION INTRAVENOUS PRN
Status: DISCONTINUED | OUTPATIENT
Start: 2021-10-04 | End: 2021-10-04 | Stop reason: HOSPADM

## 2021-10-04 RX ORDER — SODIUM CHLORIDE 0.9 % (FLUSH) 0.9 %
5-40 SYRINGE (ML) INJECTION PRN
Status: DISCONTINUED | OUTPATIENT
Start: 2021-10-04 | End: 2021-10-04 | Stop reason: HOSPADM

## 2021-10-04 RX ORDER — SODIUM CHLORIDE 0.9 % (FLUSH) 0.9 %
5-40 SYRINGE (ML) INJECTION EVERY 12 HOURS SCHEDULED
Status: DISCONTINUED | OUTPATIENT
Start: 2021-10-04 | End: 2021-10-04 | Stop reason: HOSPADM

## 2021-10-04 RX ADMIN — IOPAMIDOL 75 ML: 755 INJECTION, SOLUTION INTRAVENOUS at 15:01

## 2021-10-04 NOTE — ED NOTES
Pt verbalized understanding of discharge instructions and follow-up care, denies any questions or concerns at this time. No new prescriptions provided; pt encouraged to return to the ED for any new or worsening symptoms.      Cammie Duane, RN  10/04/21 [id number]

## 2021-10-04 NOTE — ED PROVIDER NOTES
EMERGENCY DEPARTMENT ENCOUNTER      CHIEF COMPLAINT:   Episode of slurred speech and confusion    HPI: Yaritza Alicea is a 66 y.o. female who presents to the emergency department, for evaluation, after she had an episode of slurred speech and confusion at home. Family noted that her speech became slurred and difficult to understand. She was confused. Symptoms started around 8:30 AM.  They have since resolved. She originally went to [facility] for evaluation, but left after waiting for an hour and not being seen. Her symptoms have completely resolved. She denies any associated blurred vision, facial droop, numbness, tingling or weakness in the extremities. She denies a headache. She denies any recent trauma. She has a history of atrial fibrillation for which she takes Xarelto. She has had prior TIAs. She thinks that she may have had another one. She denies fevers, chills, chest pain, cough, abdominal pain, vomiting or any other complaints. REVIEW OF SYSTEMS:   Constitutional:  Denies fever or chills  Eyes:  Denies change in visual acuity  HENT:  Denies nasal congestion or sore throat  Respiratory:  Denies cough or shortness of breath  Cardiovascular:  Denies chest pain or edema  GI:  Denies abdominal pain, nausea, vomiting, bloody stools or diarrhea  :  Denies dysuria  Musculoskeletal:  Denies back pain or joint pain  Integument:  Denies rash  Neurologic: See HPI  \"Remaining review of systems reviewed and negative. I have reviewed the nursing triage documentation and agree unless otherwise noted below. \"      PAST MEDICAL HISTORY:   Past Medical History:   Diagnosis Date    Cancer [facility])     Coronary artery disease involving native coronary artery of native heart with angina pectoris ([facility] [address] 10/22/2015    Depression     GERD (gastroesophageal reflux disease)     Hyperlipidemia     Insomnia     Polycythemia     Thyroid disease     TIA (transient ischemic attack)        CURRENT MEDICATIONS:   Home medications reviewed. SURGICAL HISTORY:   Past Surgical History:   Procedure Laterality Date    BACK SURGERY      2 surgeries    CARDIAC SURGERY      quad    CHOLECYSTECTOMY      CORONARY ARTERY BYPASS GRAFT      HERNIA REPAIR      HYSTERECTOMY      KIDNEY STONE SURGERY      LAMINECTOMY      THYROID SURGERY         FAMILY HISTORY:   Family History   Problem Relation Age of Onset    Cancer Sister     Heart Disease Brother     Heart Disease Brother     Heart Disease Brother     Heart Disease Brother     Heart Disease Brother     Heart Disease Brother        SOCIAL HISTORY:   Social History     Socioeconomic History    Marital status:      Spouse name: Not on file    Number of children: Not on file    Years of education: Not on file    Highest education level: Not on file   Occupational History    Occupation: retired   Tobacco Use    Smoking status: Never Smoker    Smokeless tobacco: Never Used   Vaping Use    Vaping Use: Never used   Substance and Sexual Activity    Alcohol use: No    Drug use: No    Sexual activity: Yes     Partners: Male   Other Topics Concern    Not on file   Social History Narrative    Not on file     Social Determinants of Health     Financial Resource Strain:     Difficulty of Paying Living Expenses:    Food Insecurity:     Worried About [address] in the Last Year:     [address] in the Last Year:    Transportation Needs:     Lack of Transportation (Medical):      Lack of Transportation (Non-Medical):    Physical Activity:     Days of Exercise per Week:     Minutes of Exercise per Session:    Stress:     Feeling of Stress :    Social Connections:     Frequency of Communication with Friends and Family:     Frequency of Social Gatherings with Friends and Family:     Attends Anab[employer]ist Services:     Active Member of Clubs or Organizations:     Attends Club or Organization Meetings:     Marital Status:    Intimate Partner Violence:     Fear of Current or Ex-Partner:     Emotionally Abused:     Physically Abused:     Sexually Abused: ALLERGIES: Sulfa antibiotics    PHYSICAL EXAM:  VITAL SIGNS:   ED Triage Vitals [10/04/21 1415]   Enc Vitals Group      BP (!) 151/94      Pulse 79      Resp 20      Temp 97.6 °F (36.4 °C)      Temp Source Oral      SpO2 96 %      Weight 150 lb (68 kg)      Height 5' 3\" (1.6 m)      Head Circumference       Peak Flow       Pain Score       Pain Loc       Pain Edu? Excl. in [address]? Constitutional:  Non-toxic appearance  HENT: Normocephalic, Atraumatic, Bilateral external ears normal, Oropharynx moist, No oral exudates, Nose normal.  Eyes:  PERRL, EOMI, Conjunctiva normal, No discharge. Neck: Normal range of motion, No tenderness, Supple, No stridor, No lymphadenopathy. Cardiovascular:  Normal heart rate, Normal rhythm  Pulmonary/Chest:  Normal breath sounds, No respiratory distress, No wheezing  Abdomen:   Bowel sounds normal, Soft, No tenderness, No masses, No pulsatile masses  Back:  No tenderness, No CVA tenderness  Extremities:  Normal range of motion, Intact distal pulses, No edema, No tenderness  Neurologic:  Alert & oriented x 3, Normal motor function, Sensation intact to light touch throughout, No focal deficits  Skin:  Warm, Dry, No erythema, No rash      EKG Interpretation  Interpreted by me  Compared to 4/18/2021  Rhythm: normal sinus  Rate: normal 74   Axis: normal  Ectopy: none  Conduction: normal  ST Segments: no acute change  T Waves: no acute change  Clinical Impression: normal sinus rhythm, no acute change    Cardiac Monitor Strip Interpretation  Interpreted by me  Monitor strip interpreted for greater than 10 seconds  Rhythm: normal sinus  Rate: normal  Ectopy: none  ST Segments: normal      Radiology / Procedures:  CTA HEAD W CONTRAST (Final result)  Result time 10/04/21 16:09:16  Final result by Mary Alcala MD (10/04/21 16:09:16)                Impression: No large vessel occlusion in the head or neck. Narrative:    EXAMINATION:   CTA OF THE HEAD WITH CONTRAST; CTA OF THE NECK 10/4/2021 2:30 pm     TECHNIQUE:   CTA of the head/brain was performed with the administration of intravenous   contrast. Multiplanar reformatted images are provided for review.  MIP images   are provided for review. Dose modulation, iterative reconstruction, and/or   weight based adjustment of the mA/kV was utilized to reduce the radiation   dose to as low as reasonably achievable.; CTA of the neck was performed with   the administration of intravenous contrast. Multiplanar reformatted images   are provided for review.  MIP images are provided for review. Stenosis of the   internal carotid arteries measured using NASCET criteria. Dose modulation,   iterative reconstruction, and/or weight based adjustment of the mA/kV was   utilized to reduce the radiation dose to as low as reasonably achievable. COMPARISON:   None     HISTORY:   ORDERING SYSTEM PROVIDED HISTORY: Slurred speech   TECHNOLOGIST PROVIDED HISTORY:   Has a \"code stroke\" or \"stroke alert\" been called? ->No   Reason for exam:->Slurred speech   Decision Support Exception - unselect if not a suspected or confirmed   emergency medical condition->Emergency Medical Condition (MA)   Reason for Exam: Slurred speech; ORDERING SYSTEM PROVIDED HISTORY: Slurred   speech   TECHNOLOGIST PROVIDED HISTORY:   Has a \"code stroke\" or \"stroke alert\" been called? ->No   Reason for exam:->Slurred speech   Decision Support Exception - unselect if not a suspected or confirmed   emergency medical condition->Emergency Medical Condition (MA)   Reason for Exam: Altered Mental Status; Aphasia     FINDINGS:     CTA NECK:     AORTIC ARCH/ARCH VESSELS: No dissection or arterial injury.  No significant   stenosis of the brachiocephalic or subclavian arteries.      CAROTID ARTERIES: No dissection, arterial injury, or hemodynamically   significant stenosis by NASCET criteria. VERTEBRAL ARTERIES: No dissection, arterial injury, or significant stenosis. SOFT TISSUES: The lung apices are clear.  No cervical or superior mediastinal   lymphadenopathy.  The larynx and pharynx are unremarkable.  No acute   abnormality of the salivary and thyroid glands. BONES: No acute osseous abnormality. CTA HEAD:     ANTERIOR CIRCULATION: No significant stenosis of the intracranial internal   carotid, anterior cerebral, or middle cerebral arteries. No aneurysm. POSTERIOR CIRCULATION: There is a very mild stenosis of proximal basilar   artery.  The posteroinferior cerebellar and superior cerebellar arteries are   patent.  There is a moderate stenosis of the left posterior cerebral artery   P2 segment.  The right P1 segment is hypoplastic. OTHER: No dural venous sinus thrombosis on this non-dedicated study. BRAIN: No mass effect or midline shift. No extra-axial fluid collection. The   gray-white differentiation is maintained.                     CTA NECK W CONTRAST (Final result)  Result time 10/04/21 16:09:16  Final result by Dominique Tom MD (10/04/21 16:09:16)                Impression:    No large vessel occlusion in the head or neck. Narrative:    EXAMINATION:   CTA OF THE HEAD WITH CONTRAST; CTA OF THE NECK 10/4/2021 2:30 pm     TECHNIQUE:   CTA of the head/brain was performed with the administration of intravenous   contrast. Multiplanar reformatted images are provided for review.  MIP images   are provided for review. Dose modulation, iterative reconstruction, and/or   weight based adjustment of the mA/kV was utilized to reduce the radiation   dose to as low as reasonably achievable.; CTA of the neck was performed with   the administration of intravenous contrast. Multiplanar reformatted images   are provided for review.  MIP images are provided for review.  Stenosis of the   internal carotid arteries measured using NASCET criteria. Dose modulation,   iterative reconstruction, and/or weight based adjustment of the mA/kV was   utilized to reduce the radiation dose to as low as reasonably achievable. COMPARISON:   None     HISTORY:   ORDERING SYSTEM PROVIDED HISTORY: Slurred speech   TECHNOLOGIST PROVIDED HISTORY:   Has a \"code stroke\" or \"stroke alert\" been called? ->No   Reason for exam:->Slurred speech   Decision Support Exception - unselect if not a suspected or confirmed   emergency medical condition->Emergency Medical Condition (MA)   Reason for Exam: Slurred speech; ORDERING SYSTEM PROVIDED HISTORY: Slurred   speech   TECHNOLOGIST PROVIDED HISTORY:   Has a \"code stroke\" or \"stroke alert\" been called? ->No   Reason for exam:->Slurred speech   Decision Support Exception - unselect if not a suspected or confirmed   emergency medical condition->Emergency Medical Condition (MA)   Reason for Exam: Altered Mental Status; Aphasia     FINDINGS:     CTA NECK:     AORTIC ARCH/ARCH VESSELS: No dissection or arterial injury.  No significant   stenosis of the brachiocephalic or subclavian arteries. CAROTID ARTERIES: No dissection, arterial injury, or hemodynamically   significant stenosis by NASCET criteria. VERTEBRAL ARTERIES: No dissection, arterial injury, or significant stenosis. SOFT TISSUES: The lung apices are clear.  No cervical or superior mediastinal   lymphadenopathy.  The larynx and pharynx are unremarkable.  No acute   abnormality of the salivary and thyroid glands. BONES: No acute osseous abnormality. CTA HEAD:     ANTERIOR CIRCULATION: No significant stenosis of the intracranial internal   carotid, anterior cerebral, or middle cerebral arteries. No aneurysm. POSTERIOR CIRCULATION: There is a very mild stenosis of proximal basilar   artery.  The posteroinferior cerebellar and superior cerebellar arteries are   patent.  There is a moderate stenosis of the left posterior cerebral artery   P2 segment.  The right P1 segment is hypoplastic. OTHER: No dural venous sinus thrombosis on this non-dedicated study. BRAIN: No mass effect or midline shift. No extra-axial fluid collection. The   gray-white differentiation is maintained.                     CT HEAD WO CONTRAST (Final result)  Result time 10/04/21 15:19:32  Final result by Elpidio Bueno MD (10/04/21 15:19:32)                Impression: 1. No acute intracranial abnormality. Narrative:    EXAMINATION:   CT OF THE HEAD WITHOUT CONTRAST  10/4/2021 2:30 pm     TECHNIQUE:   CT of the head was performed without the administration of intravenous   contrast. Dose modulation, iterative reconstruction, and/or weight based   adjustment of the mA/kV was utilized to reduce the radiation dose to as low   as reasonably achievable. COMPARISON:   None. HISTORY:   ORDERING SYSTEM PROVIDED HISTORY: Slurred speech   TECHNOLOGIST PROVIDED HISTORY:   Has a \"code stroke\" or \"stroke alert\" been called? ->No   Reason for exam:->Slurred speech   Decision Support Exception - unselect if not a suspected or confirmed   emergency medical condition->Emergency Medical Condition (MA)     FINDINGS:   BRAIN/VENTRICLES: There is no acute intracranial hemorrhage, mass effect or   midline shift. No abnormal extra-axial fluid collection.  The gray-white   differentiation is maintained without evidence of an acute infarct. There is   prominence of the ventricles and sulci due to global parenchymal volume loss. There are nonspecific areas of hypoattenuation within the periventricular and   subcortical white matter, which likely represent chronic microvascular   ischemic change. Hollace Gondola is prominent perivascular space or old lacune infarct   in the right basal ganglia. ORBITS: The visualized portion of the orbits demonstrate no acute abnormality. SINUSES: The visualized paranasal sinuses and mastoid air cells demonstrate   no acute abnormality.      SOFT TISSUES/SKULL: No acute abnormality of the visualized skull or soft   tissues.                     XR CHEST PORTABLE (Final result)  Result time 10/04/21 15:21:11  Final result by Kayy Grant MD (10/04/21 15:21:11)                Impression:    No evidence of acute process. Narrative:    EXAMINATION:   ONE XRAY VIEW OF THE CHEST     10/4/2021 2:29 pm     COMPARISON:   09/26/2015     HISTORY:   ORDERING SYSTEM PROVIDED HISTORY: Slurred speech   TECHNOLOGIST PROVIDED HISTORY:   Reason for exam:->Slurred speech     FINDINGS:   Previous sternotomy.  Normal heart size and pulmonary vasculature.  No focal   consolidations, pleural effusions, or pneumothorax. Labs Reviewed   CBC WITH AUTO DIFFERENTIAL - Abnormal; Notable for the following components:       Result Value    Hemoglobin 16.3 (*)     Hematocrit 52.1 (*)     MCH 31.1 (*)     MCHC 31.3 (*)     RDW 17.8 (*)     Segs Relative 73.9 (*)     Lymphocytes % 19.2 (*)     Immature Neutrophil % 0.7 (*)     All other components within normal limits   COMPREHENSIVE METABOLIC PANEL W/ REFLEX TO MG FOR LOW K - Abnormal; Notable for the following components:    Glucose 101 (*)     Calcium 11.3 (*)     Alkaline Phosphatase 179 (*)     All other components within normal limits   PROTIME-INR - Abnormal; Notable for the following components:    Protime 20.2 (*)     All other components within normal limits   POCT GLUCOSE - Abnormal; Notable for the following components:    POC Glucose 108 (*)     All other components within normal limits   POCT GLUCOSE - Normal   TROPONIN   POCT GLUCOSE       ED COURSE & MEDICAL DECISION MAKING:  Pertinent Labs & Imaging studies reviewed. (See chart for details)  On exam, the patient is afebrile and nontoxic appearing. She is mildly hypertensive on arrival with a history of hypertension and is asymptomatic. This resolves without treatment. She is otherwise hemodynamically stable and neurologically intact.   NIH stroke scale is 0. Stroke alert was not called as the patient is now asymptomatic. EKG shows a normal sinus rhythm with no ST elevation or depression. Labs are obtained and are significant for a slightly elevated hemoglobin which appears to be chronic in nature. .  INR is 1.76. There are no other clinically significant lab abnormalities. CT head is negative. CTA head and neck are negative. The patient declined aspirin as she is on Xarelto and has been told not to take aspirin. I suspect that the patient had a TIA. I have a low suspicion for intracranial hemorrhage, meningitis, seizure, status epilepticus, large vessel occlusion or sepsis. I recommended transfer for admission to the hospital but the patient declined stating that she feels fine and wants to go home to pursue further outpatient work-up. She states she knows that there are no beds anywhere and that she would have to wait hours to be admitted somewhere. She prefers to go home. She is to follow-up with her doctor in the next 1 to 2 days. Return precautions are given. The patient is instructed to call 911 and go to the closest hospital should her symptoms return. The patient verbalized understanding, was agreeable with plan, and the patient was discharged home in stable condition. Clinical Impression:  1. Slurred speech    2. Confusion    3.  TIA (transient ischemic attack)        Disposition referral (if applicable):  Jennifer Rodriguez MD  [address] [facility] [address] [id number] [phone]    Schedule an appointment as soon as possible for a visit in 1 day      [place] [address] [place] [phone]  Go to   If symptoms worsen      Disposition medications (if applicable):  Discharge Medication List as of 10/4/2021  4:25 PM            Comment: Please note this report has been produced using speech recognition software and may contain errors related to that system including errors in grammar, punctuation, and spelling, as well as words and phrases that may be inappropriate. If there are any questions or concerns please feel free to contact the dictating provider for clarification.         Derek Gamez MD  10/04/21 Richard Cheng

## 2021-10-04 NOTE — ED NOTES
The patient presents to the er today with complaints of confusion and slurred speech this morning. She reports of a recent right foot surgery and this morning her family noted some slurred speech and confusion. They had originally went to [facility], but left after waiting an hour and not being seen. She was completely alert and oriented on arrival, has feeling and movement in all extremities, and has no slurred speech.       Amada Szymanski RN  10/04/21 1627

## 2021-10-20 ENCOUNTER — OFFICE VISIT (OUTPATIENT)
Dept: ONCOLOGY | Age: 71
End: 2021-10-20
Payer: MEDICARE

## 2021-10-20 ENCOUNTER — HOSPITAL ENCOUNTER (OUTPATIENT)
Dept: INFUSION THERAPY | Age: 71
Discharge: HOME | End: 2021-10-20
Payer: MEDICARE

## 2021-10-20 VITALS
BODY MASS INDEX: 29.06 KG/M2 | HEART RATE: 79 BPM | DIASTOLIC BLOOD PRESSURE: 82 MMHG | TEMPERATURE: 96.2 F | OXYGEN SATURATION: 96 % | WEIGHT: 164 LBS | HEIGHT: 63 IN | RESPIRATION RATE: 16 BRPM | SYSTOLIC BLOOD PRESSURE: 128 MMHG

## 2021-10-20 DIAGNOSIS — E53.8 B12 DEFICIENCY: ICD-10-CM

## 2021-10-20 DIAGNOSIS — D50.9 IRON DEFICIENCY ANEMIA, UNSPECIFIED IRON DEFICIENCY ANEMIA TYPE: ICD-10-CM

## 2021-10-20 DIAGNOSIS — D69.6 THROMBOCYTOPENIA, UNSPECIFIED (HCC): ICD-10-CM

## 2021-10-20 DIAGNOSIS — D45 POLYCYTHEMIA VERA (HCC): ICD-10-CM

## 2021-10-20 DIAGNOSIS — C44.92 SQUAMOUS CELL CARCINOMA OF SKIN, UNSPECIFIED: ICD-10-CM

## 2021-10-20 DIAGNOSIS — K90.9 INTESTINAL MALABSORPTION, UNSPECIFIED TYPE: ICD-10-CM

## 2021-10-20 DIAGNOSIS — D45 POLYCYTHEMIA VERA (HCC): Primary | ICD-10-CM

## 2021-10-20 LAB
ALBUMIN SERPL-MCNC: 4.4 GM/DL (ref 3.4–5)
ALP BLD-CCNC: 118 IU/L (ref 40–129)
ALT SERPL-CCNC: 10 U/L (ref 10–40)
ANION GAP SERPL CALCULATED.3IONS-SCNC: 8 MMOL/L (ref 4–16)
AST SERPL-CCNC: 12 IU/L (ref 15–37)
BASOPHILS ABSOLUTE: 0.1 K/CU MM
BASOPHILS RELATIVE PERCENT: 1.2 % (ref 0–1)
BILIRUB SERPL-MCNC: 0.4 MG/DL (ref 0–1)
BUN BLDV-MCNC: 7 MG/DL (ref 6–23)
CALCIUM SERPL-MCNC: 10.8 MG/DL (ref 8.3–10.6)
CHLORIDE BLD-SCNC: 101 MMOL/L (ref 99–110)
CO2: 28 MMOL/L (ref 21–32)
CREAT SERPL-MCNC: 0.6 MG/DL (ref 0.6–1.1)
DIFFERENTIAL TYPE: ABNORMAL
EOSINOPHILS ABSOLUTE: 0.1 K/CU MM
EOSINOPHILS RELATIVE PERCENT: 1.7 % (ref 0–3)
FERRITIN: 77 NG/ML (ref 15–150)
FOLATE: >20 NG/ML (ref 3.1–17.5)
GFR AFRICAN AMERICAN: >60 ML/MIN/1.73M2
GFR NON-AFRICAN AMERICAN: >60 ML/MIN/1.73M2
GLUCOSE BLD-MCNC: 87 MG/DL (ref 70–99)
HCT VFR BLD CALC: 51.8 % (ref 37–47)
HEMOGLOBIN: 16.7 GM/DL (ref 12.5–16)
IRON: 58 UG/DL (ref 37–145)
LACTATE DEHYDROGENASE: 168 IU/L (ref 120–246)
LYMPHOCYTES ABSOLUTE: 1.9 K/CU MM
LYMPHOCYTES RELATIVE PERCENT: 25.4 % (ref 24–44)
MCH RBC QN AUTO: 33 PG (ref 27–31)
MCHC RBC AUTO-ENTMCNC: 32.2 % (ref 32–36)
MCV RBC AUTO: 102.4 FL (ref 78–100)
MONOCYTES ABSOLUTE: 0.3 K/CU MM
MONOCYTES RELATIVE PERCENT: 3.8 % (ref 0–4)
PCT TRANSFERRIN: 24 % (ref 10–44)
PDW BLD-RTO: 20.2 % (ref 11.7–14.9)
PLATELET # BLD: 250 K/CU MM (ref 140–440)
PMV BLD AUTO: 9.9 FL (ref 7.5–11.1)
POTASSIUM SERPL-SCNC: 4.9 MMOL/L (ref 3.5–5.1)
RBC # BLD: 5.06 M/CU MM (ref 4.2–5.4)
SEGMENTED NEUTROPHILS ABSOLUTE COUNT: 5.2 K/CU MM
SEGMENTED NEUTROPHILS RELATIVE PERCENT: 67.9 % (ref 36–66)
SODIUM BLD-SCNC: 137 MMOL/L (ref 135–145)
TOTAL IRON BINDING CAPACITY: 237 UG/DL (ref 250–450)
TOTAL PROTEIN: 6.8 GM/DL (ref 6.4–8.2)
UNSATURATED IRON BINDING CAPACITY: 179 UG/DL (ref 110–370)
VITAMIN B-12: 971 PG/ML (ref 211–911)
WBC # BLD: 7.7 K/CU MM (ref 4–10.5)

## 2021-10-20 PROCEDURE — G8417 CALC BMI ABV UP PARAM F/U: HCPCS | Performed by: INTERNAL MEDICINE

## 2021-10-20 PROCEDURE — 1090F PRES/ABSN URINE INCON ASSESS: CPT | Performed by: INTERNAL MEDICINE

## 2021-10-20 PROCEDURE — 36415 COLL VENOUS BLD VENIPUNCTURE: CPT

## 2021-10-20 PROCEDURE — 99214 OFFICE O/P EST MOD 30 MIN: CPT | Performed by: INTERNAL MEDICINE

## 2021-10-20 PROCEDURE — 83615 LACTATE (LD) (LDH) ENZYME: CPT

## 2021-10-20 PROCEDURE — 85025 COMPLETE CBC W/AUTO DIFF WBC: CPT

## 2021-10-20 PROCEDURE — 82607 VITAMIN B-12: CPT

## 2021-10-20 PROCEDURE — 82728 ASSAY OF FERRITIN: CPT

## 2021-10-20 PROCEDURE — 3017F COLORECTAL CA SCREEN DOC REV: CPT | Performed by: INTERNAL MEDICINE

## 2021-10-20 PROCEDURE — G8484 FLU IMMUNIZE NO ADMIN: HCPCS | Performed by: INTERNAL MEDICINE

## 2021-10-20 PROCEDURE — 82746 ASSAY OF FOLIC ACID SERUM: CPT

## 2021-10-20 PROCEDURE — G8400 PT W/DXA NO RESULTS DOC: HCPCS | Performed by: INTERNAL MEDICINE

## 2021-10-20 PROCEDURE — 4040F PNEUMOC VAC/ADMIN/RCVD: CPT | Performed by: INTERNAL MEDICINE

## 2021-10-20 PROCEDURE — 99211 OFF/OP EST MAY X REQ PHY/QHP: CPT

## 2021-10-20 PROCEDURE — 1123F ACP DISCUSS/DSCN MKR DOCD: CPT | Performed by: INTERNAL MEDICINE

## 2021-10-20 PROCEDURE — 80053 COMPREHEN METABOLIC PANEL: CPT

## 2021-10-20 PROCEDURE — G8427 DOCREV CUR MEDS BY ELIG CLIN: HCPCS | Performed by: INTERNAL MEDICINE

## 2021-10-20 PROCEDURE — 83540 ASSAY OF IRON: CPT

## 2021-10-20 PROCEDURE — 1036F TOBACCO NON-USER: CPT | Performed by: INTERNAL MEDICINE

## 2021-10-20 PROCEDURE — 83550 IRON BINDING TEST: CPT

## 2021-10-20 NOTE — PROGRESS NOTES
Patient Name: Bertha Tejada  Patient : 1950  Patient MRN: [id number]     Primary Oncologist: Victor Manuel Reed MD  PCP: Dr Estrada Rivas        Date of Service: 10/20/2021      Chief Complaint:   Chief Complaint   Patient presents with    Follow-up        Active Problem list  1. Polycythemia vera ([facility] [address]    2. Squamous cell carcinoma of skin, unspecified    3. Thrombocytopenia, unspecified ([facility] [address]    4. Iron deficiency anemia, unspecified iron deficiency anemia type    5. Intestinal malabsorption, unspecified type    6. B12 deficiency           HPI:        Referred in 2016 for polycythemia, CBC with wbc 13.6, Hb 14.4 hct 47.8, plt 513K  16 Jak2 postive  Was started on Hydrea as h/o MI needing CABG and also h/o TIA x 2, dose adjusted as needed. Then with SHAILA, had EGD, Cscope and VCE in may 2016  Cscope with diverticulosis and nonbleeding internal hemorrhoids  EGD with erythematous stomach but biopsy neg for h.pylori or malignancy  Capsule endoscopy with apthous ulcers in the small bowel. 18: Hydrea 1000mg OD    2018: Ct abdomen and pelvis:Impression  No renal or ureteral stone. No bladder stone. Distal left ureter does not completely opacify but otherwise appears  unremarkable. Otherwise no filling defect within the renal or ureteral  collecting systems. Mild irregularity along the posterior bladder wall which may be related to  ureterovesical junction bilaterally. Recommend further evaluation with  cystoscopy given hematuria.       19: CBC with wbc 11, Hb 20.2, hct 60, mcv 105, plt 171K  Creatinine 1.06, alk [id number]  ldh 296    3/12/19:CBC with wbc 11.9, Hb 15.6, hct 49.2, mcv 106, plt 384K  Ferritin 9  sats 7    2020 LDH of 149 CMP with sodium 138 potassium 4.6 glucose of 37 creatinine 1 calcium 10.3 ALT 8 AST 10 alk phos 115 CBC with WBC 11.7 hemoglobin 13.1 hematocrit 47.7 MCV of 101.7 platelets of 508      2020 EGD with Possible barrettes but biopsy negative     Had phlebotomy Physical Exam:    CONSTITUTIONAL: awake, alert, overweight  EYES:No palor or icterus   ENT:NCAT  NECK: No JVD   HEMATOLOGIC/LYMPHATIC: no cervical, supraclavicular or axillary lymphadenopathy   LUNGS: CTAB, no wheeze   CARDIOVASCULAR:Sternal scar healed well. s1s2 rrr SM  ABDOMEN: soft ntnd bs pos, no hsm  NEUROLOGIC: GI   SKIN: AKs and echymosis, Scalp scar well-healed  EXTREMITIES: right LE edema, right foot in boot and ace wrap on.        Labs:    Hematology:  Lab Results   Component Value Date    WBC 8.1 10/04/2021    RBC 5.24 10/04/2021    HGB 16.3 (H) 10/04/2021    HCT 52.1 (H) 10/04/2021    MCV 99.4 10/04/2021    MCH 31.1 (H) 10/04/2021    MCHC 31.3 (L) 10/04/2021    RDW 17.8 (H) 10/04/2021     10/04/2021    MPV 9.8 10/04/2021    BANDSPCT 3 (L) 10/11/2019    SEGSPCT 73.9 (H) 10/04/2021    EOSRELPCT 2.5 10/04/2021    BASOPCT 0.4 10/04/2021    LYMPHOPCT 19.2 (L) 10/04/2021    MONOPCT 3.3 10/04/2021    BANDABS 0.38 10/11/2019    SEGSABS 6.0 10/04/2021    EOSABS 0.2 10/04/2021    BASOSABS 0.0 10/04/2021    LYMPHSABS 1.6 10/04/2021    MONOSABS 0.3 10/04/2021    DIFFTYPE AUTOMATED DIFFERENTIAL 10/04/2021    ANISOCYTOSIS 1+ 12/27/2019    POLYCHROM 1+ 12/27/2019    WBCMORP FEW  ATYPICAL LYMPH(S) NOTED   12/23/2019    PLTM RARE LARGE PLATELETS SEEN ON SMEAR 10/11/2019     No results found for: ESR    Chemistry:  Lab Results   Component Value Date     10/04/2021    K 4.3 10/04/2021    CL 99 10/04/2021    CO2 25 10/04/2021    BUN 15 10/04/2021    CREATININE 0.9 10/04/2021    GLUCOSE 108 10/04/2021    CALCIUM 11.3 (H) 10/04/2021    PROT 6.6 10/04/2021    LABALBU 4.0 10/04/2021    BILITOT 0.5 10/04/2021    ALKPHOS 179 (H) 10/04/2021    AST 20 10/04/2021    ALT 20 10/04/2021    LABGLOM >60 10/04/2021    GFRAA >60 10/04/2021    MG 2.0 09/26/2015    POCCA 1.31 09/23/2015    POCGLU 108 (H) 10/04/2021     Lab Results   Component Value Date     08/06/2021     No components found for: LD  Lab Results Component Value Date    TSHHS 2.720 05/06/2019    T4FREE 1.20 05/06/2019    FT3 2.8 11/08/2016       Immunology:  Lab Results   Component Value Date    PROT 6.6 10/04/2021     No results found for: Db Hernandez, KLFLCR  No results found for: B2M    Coagulation Panel:  Lab Results   Component Value Date    PROTIME 20.2 (H) 10/04/2021    INR 1.76 10/04/2021    APTT 31.3 09/17/2020       Anemia Panel:  Lab Results   Component Value Date    Luiz Achilles 939.0 (H) 08/06/2021    FOLATE 2.9 (L) 08/06/2021       Tumor Markers:  No results found for: , CEA, , LABCA2, PSA      Imaging: Reviewed     Pathology:Reviewed     Observations:  Performance Status: ECOG 0  Depression Status: No data recorded          Assessment & Plan:  PVera/Low ferritin: Low Erythropoetin and JAK2 + suggestive of polycythemia vera. Started Hydrea jan 2018, as H/O thrombosis(needing CABG) and also Possible TIA. leukocytosis in may 2019 was most probably sec to steroids, Flow at that point with left shift but otherwise normal.  CBC march 2021  with wbc 21.5, Hb 18.5, hct 61, mcv 78.4, platelet 103. Ferritin 27, sats 7% . Recommended Increasing hydrea to 1500mg OD, increase iron to PO bid and phlebotomy x 2. Continued ASA and adequate hydration. Note severe decline in wbc and platelet counts , consistent upon recheck CBC. Could be sec to increased hydrea dose, phlebotomy. Hydrea and ASA were held. No hemolysis. FLOW with no evidence of acute leukemia  Resumed Hydrea and low-dose aspirin once counts were normalized, but aspirin was discontinued by cardiology and she does not want to resume it  Is currently on hydrea 1000 mg daily mg daily, continue the same dose as of October 20 8034, recommend folic acid supplementation. CBC pending and phlebotomy recs pending that. Will adjust dose of hydrea per CBC. Low b12:  continue only oral supplementation. Left sided facial/jaw swelling: CT MF as above. Resolved. ? etiology.      Iron indices and ferritin were suggestive of jeff. Recent GI eval normal , no overt bleeding. Recommend oral iron twice daily, adequate bowel regimen. Repeat levels pending October 20 2021     RLE edema: Ultrasound negative for any DVT. Had bunionectomy recently    TIA: Is on xarelto. ASA d/c per cardiology. Phlebotomy to keep Hct <47%    Constipation: Recommend stool softener and laxatives as needed    Increased alk Po4: H/o cholecystectomy    Skin cancer, followed by surgery    Lipoma on the neck: Is being followed    Easy bruising: PT/PTT normal    Screening: Is uptodate on mammogram(2021 in [place] was normal)and colonoscopies. Dexa scan 2019 normal. No further pap smears. Discussed the above findings and plan with the pt. She verbalizes understanding. Answered all questions. Discussed healthy lifestyle including exercise and weight loss. Recommend follow up with PCP and other specialists. Please do not hesitate to call if you need any further information. She has received two  doses of COVID Vaccine. Flu vaccine not yet. RTC November 2021or earlier if new Sx. I have recommended that the patient follow CDC guidelines for prevention of COVID-19 infection.     [address] Sarah Espinosa

## 2021-10-20 NOTE — PROGRESS NOTES
MA Rooming Questions  Patient: Lilly Mcfarland  MRN: [id number]    Date: 10/20/2021        1. Do you have any new issues?   no         2. Do you need any refills on medications?    no    3. Have you had any imaging done since your last visit? yes - CTA Head and neck    4. Have you been hospitalized or seen in the emergency room since your last visit here?   yes - 10/4/21 Slurred Speech, TIA    5. Did the patient have a depression screening completed today?  No    No data recorded     PHQ-9 Given to (if applicable):               PHQ-9 Score (if applicable):                     [] Positive     []  Negative              Does question #9 need addressed (if applicable)                     [] Yes    []  No               Santos Cox MA

## 2021-11-30 ENCOUNTER — HOSPITAL ENCOUNTER (OUTPATIENT)
Dept: INFUSION THERAPY | Age: 71
Discharge: HOME | End: 2021-11-30
Payer: MEDICARE

## 2021-11-30 ENCOUNTER — HOSPITAL ENCOUNTER (OUTPATIENT)
Age: 71
Setting detail: SPECIMEN
Discharge: HOME | End: 2021-11-30
Payer: MEDICARE

## 2021-11-30 DIAGNOSIS — E53.8 B12 DEFICIENCY: ICD-10-CM

## 2021-11-30 DIAGNOSIS — D50.9 IRON DEFICIENCY ANEMIA, UNSPECIFIED IRON DEFICIENCY ANEMIA TYPE: ICD-10-CM

## 2021-11-30 DIAGNOSIS — D69.6 THROMBOCYTOPENIA, UNSPECIFIED (HCC): ICD-10-CM

## 2021-11-30 DIAGNOSIS — D45 POLYCYTHEMIA VERA (HCC): ICD-10-CM

## 2021-11-30 DIAGNOSIS — C44.92 SQUAMOUS CELL CARCINOMA OF SKIN, UNSPECIFIED: ICD-10-CM

## 2021-11-30 DIAGNOSIS — D45 POLYCYTHEMIA VERA (HCC): Primary | ICD-10-CM

## 2021-11-30 DIAGNOSIS — K90.9 INTESTINAL MALABSORPTION, UNSPECIFIED TYPE: ICD-10-CM

## 2021-11-30 LAB
ALBUMIN SERPL-MCNC: 4.7 GM/DL (ref 3.4–5)
ALBUMIN SERPL-MCNC: 4.8 GM/DL (ref 3.4–5)
ALP BLD-CCNC: 156 IU/L (ref 40–129)
ALP BLD-CCNC: 157 IU/L (ref 40–129)
ALT SERPL-CCNC: 11 U/L (ref 10–40)
ALT SERPL-CCNC: 13 U/L (ref 10–40)
ANION GAP SERPL CALCULATED.3IONS-SCNC: 11 MMOL/L (ref 4–16)
ANION GAP SERPL CALCULATED.3IONS-SCNC: 9 MMOL/L (ref 4–16)
AST SERPL-CCNC: 14 IU/L (ref 15–37)
AST SERPL-CCNC: 15 IU/L (ref 15–37)
BASOPHILS ABSOLUTE: 0.1 K/CU MM
BASOPHILS RELATIVE PERCENT: 0.6 % (ref 0–1)
BILIRUB SERPL-MCNC: 0.6 MG/DL (ref 0–1)
BILIRUB SERPL-MCNC: 0.6 MG/DL (ref 0–1)
BILIRUBIN DIRECT: 0.2 MG/DL (ref 0–0.3)
BILIRUBIN, INDIRECT: 0.4 MG/DL (ref 0–0.7)
BUN BLDV-MCNC: 10 MG/DL (ref 6–23)
BUN BLDV-MCNC: 11 MG/DL (ref 6–23)
CALCIUM SERPL-MCNC: 11 MG/DL (ref 8.3–10.6)
CALCIUM SERPL-MCNC: 11.1 MG/DL (ref 8.3–10.6)
CHLORIDE BLD-SCNC: 103 MMOL/L (ref 99–110)
CHLORIDE BLD-SCNC: 105 MMOL/L (ref 99–110)
CHOLESTEROL: 198 MG/DL
CO2: 29 MMOL/L (ref 21–32)
CO2: 29 MMOL/L (ref 21–32)
CREAT SERPL-MCNC: 0.9 MG/DL (ref 0.6–1.1)
CREAT SERPL-MCNC: 0.9 MG/DL (ref 0.6–1.1)
DIFFERENTIAL TYPE: ABNORMAL
EOSINOPHILS ABSOLUTE: 0.2 K/CU MM
EOSINOPHILS RELATIVE PERCENT: 1.6 % (ref 0–3)
FERRITIN: 63 NG/ML (ref 15–150)
FOLATE: >20 NG/ML (ref 3.1–17.5)
GFR AFRICAN AMERICAN: >60 ML/MIN/1.73M2
GFR AFRICAN AMERICAN: >60 ML/MIN/1.73M2
GFR NON-AFRICAN AMERICAN: >60 ML/MIN/1.73M2
GFR NON-AFRICAN AMERICAN: >60 ML/MIN/1.73M2
GLUCOSE BLD-MCNC: 103 MG/DL (ref 70–99)
GLUCOSE BLD-MCNC: 106 MG/DL (ref 70–99)
HCT VFR BLD CALC: 54 % (ref 37–47)
HDLC SERPL-MCNC: 53 MG/DL
HEMOGLOBIN: 18 GM/DL (ref 12.5–16)
IRON: 67 UG/DL (ref 37–145)
LACTATE DEHYDROGENASE: 255 IU/L (ref 120–246)
LDL CHOLESTEROL DIRECT: 112 MG/DL
LYMPHOCYTES ABSOLUTE: 1.7 K/CU MM
LYMPHOCYTES RELATIVE PERCENT: 15.2 % (ref 24–44)
MCH RBC QN AUTO: 35.7 PG (ref 27–31)
MCHC RBC AUTO-ENTMCNC: 33.3 % (ref 32–36)
MCV RBC AUTO: 107.1 FL (ref 78–100)
MONOCYTES ABSOLUTE: 0.4 K/CU MM
MONOCYTES RELATIVE PERCENT: 3.8 % (ref 0–4)
PCT TRANSFERRIN: 24 % (ref 10–44)
PDW BLD-RTO: 19.4 % (ref 11.7–14.9)
PLATELET # BLD: 297 K/CU MM (ref 140–440)
PMV BLD AUTO: 10.1 FL (ref 7.5–11.1)
POTASSIUM SERPL-SCNC: 5 MMOL/L (ref 3.5–5.1)
POTASSIUM SERPL-SCNC: 5.2 MMOL/L (ref 3.5–5.1)
RBC # BLD: 5.04 M/CU MM (ref 4.2–5.4)
SEGMENTED NEUTROPHILS ABSOLUTE COUNT: 9 K/CU MM
SEGMENTED NEUTROPHILS RELATIVE PERCENT: 78.8 % (ref 36–66)
SODIUM BLD-SCNC: 143 MMOL/L (ref 135–145)
SODIUM BLD-SCNC: 143 MMOL/L (ref 135–145)
TOTAL IRON BINDING CAPACITY: 276 UG/DL (ref 250–450)
TOTAL PROTEIN: 7.2 GM/DL (ref 6.4–8.2)
TOTAL PROTEIN: 7.3 GM/DL (ref 6.4–8.2)
TRIGL SERPL-MCNC: 237 MG/DL
UNSATURATED IRON BINDING CAPACITY: 209 UG/DL (ref 110–370)
VITAMIN B-12: >2000 PG/ML (ref 211–911)
WBC # BLD: 11.4 K/CU MM (ref 4–10.5)

## 2021-11-30 PROCEDURE — 82728 ASSAY OF FERRITIN: CPT

## 2021-11-30 PROCEDURE — 36415 COLL VENOUS BLD VENIPUNCTURE: CPT

## 2021-11-30 PROCEDURE — 83721 ASSAY OF BLOOD LIPOPROTEIN: CPT

## 2021-11-30 PROCEDURE — 83540 ASSAY OF IRON: CPT

## 2021-11-30 PROCEDURE — 80061 LIPID PANEL: CPT

## 2021-11-30 PROCEDURE — 85025 COMPLETE CBC W/AUTO DIFF WBC: CPT

## 2021-11-30 PROCEDURE — 82607 VITAMIN B-12: CPT

## 2021-11-30 PROCEDURE — 83550 IRON BINDING TEST: CPT

## 2021-11-30 PROCEDURE — 80053 COMPREHEN METABOLIC PANEL: CPT

## 2021-11-30 PROCEDURE — 82746 ASSAY OF FOLIC ACID SERUM: CPT

## 2021-11-30 PROCEDURE — 83615 LACTATE (LD) (LDH) ENZYME: CPT

## 2021-11-30 PROCEDURE — 82248 BILIRUBIN DIRECT: CPT

## 2021-12-01 ENCOUNTER — CLINICAL DOCUMENTATION (OUTPATIENT)
Dept: ONCOLOGY | Age: 71
End: 2021-12-01

## 2021-12-07 ENCOUNTER — HOSPITAL ENCOUNTER (OUTPATIENT)
Dept: INFUSION THERAPY | Age: 71
Setting detail: INFUSION SERIES
Discharge: HOME | End: 2021-12-07
Payer: MEDICARE

## 2021-12-07 ENCOUNTER — OFFICE VISIT (OUTPATIENT)
Dept: ONCOLOGY | Age: 71
End: 2021-12-07
Payer: MEDICARE

## 2021-12-07 ENCOUNTER — HOSPITAL ENCOUNTER (OUTPATIENT)
Dept: INFUSION THERAPY | Age: 71
Discharge: HOME | End: 2021-12-07
Payer: MEDICARE

## 2021-12-07 VITALS
HEIGHT: 63 IN | HEART RATE: 84 BPM | TEMPERATURE: 96.1 F | BODY MASS INDEX: 29.48 KG/M2 | OXYGEN SATURATION: 96 % | DIASTOLIC BLOOD PRESSURE: 73 MMHG | WEIGHT: 166.4 LBS | SYSTOLIC BLOOD PRESSURE: 137 MMHG

## 2021-12-07 VITALS
RESPIRATION RATE: 16 BRPM | TEMPERATURE: 97.3 F | SYSTOLIC BLOOD PRESSURE: 128 MMHG | HEART RATE: 76 BPM | DIASTOLIC BLOOD PRESSURE: 77 MMHG | OXYGEN SATURATION: 99 %

## 2021-12-07 DIAGNOSIS — D45 POLYCYTHEMIA VERA (HCC): Primary | ICD-10-CM

## 2021-12-07 DIAGNOSIS — C44.92 SQUAMOUS CELL CARCINOMA OF SKIN, UNSPECIFIED: ICD-10-CM

## 2021-12-07 DIAGNOSIS — K90.9 INTESTINAL MALABSORPTION, UNSPECIFIED TYPE: ICD-10-CM

## 2021-12-07 DIAGNOSIS — D69.6 THROMBOCYTOPENIA, UNSPECIFIED (HCC): ICD-10-CM

## 2021-12-07 DIAGNOSIS — D50.9 IRON DEFICIENCY ANEMIA, UNSPECIFIED IRON DEFICIENCY ANEMIA TYPE: ICD-10-CM

## 2021-12-07 DIAGNOSIS — E53.8 B12 DEFICIENCY: ICD-10-CM

## 2021-12-07 PROCEDURE — 3017F COLORECTAL CA SCREEN DOC REV: CPT | Performed by: INTERNAL MEDICINE

## 2021-12-07 PROCEDURE — 99211 OFF/OP EST MAY X REQ PHY/QHP: CPT

## 2021-12-07 PROCEDURE — 1123F ACP DISCUSS/DSCN MKR DOCD: CPT | Performed by: INTERNAL MEDICINE

## 2021-12-07 PROCEDURE — G8400 PT W/DXA NO RESULTS DOC: HCPCS | Performed by: INTERNAL MEDICINE

## 2021-12-07 PROCEDURE — G8427 DOCREV CUR MEDS BY ELIG CLIN: HCPCS | Performed by: INTERNAL MEDICINE

## 2021-12-07 PROCEDURE — 1036F TOBACCO NON-USER: CPT | Performed by: INTERNAL MEDICINE

## 2021-12-07 PROCEDURE — G8484 FLU IMMUNIZE NO ADMIN: HCPCS | Performed by: INTERNAL MEDICINE

## 2021-12-07 PROCEDURE — 4040F PNEUMOC VAC/ADMIN/RCVD: CPT | Performed by: INTERNAL MEDICINE

## 2021-12-07 PROCEDURE — G8417 CALC BMI ABV UP PARAM F/U: HCPCS | Performed by: INTERNAL MEDICINE

## 2021-12-07 PROCEDURE — 1090F PRES/ABSN URINE INCON ASSESS: CPT | Performed by: INTERNAL MEDICINE

## 2021-12-07 PROCEDURE — 99214 OFFICE O/P EST MOD 30 MIN: CPT | Performed by: INTERNAL MEDICINE

## 2021-12-07 PROCEDURE — 99195 PHLEBOTOMY: CPT

## 2021-12-07 RX ORDER — HYDROXYUREA 500 MG/1
1500 CAPSULE ORAL DAILY
Qty: 270 CAPSULE | Refills: 5 | Status: SHIPPED | OUTPATIENT
Start: 2021-12-07 | End: 2022-03-07

## 2021-12-07 RX ORDER — FERROUS GLUCONATE 324(37.5)
324 TABLET ORAL 2 TIMES DAILY
Qty: 180 TABLET | Refills: 5 | Status: SHIPPED | OUTPATIENT
Start: 2021-12-07 | End: 2022-06-06

## 2021-12-07 NOTE — PROGRESS NOTES
MA Rooming Questions  Patient: Jacqueline Garcia  MRN: [id number]    Date: 12/7/2021        1. Do you have any new issues?   no         2. Do you need any refills on medications? yes - Hydrea    3. Have you had any imaging done since your last visit?   no    4. Have you been hospitalized or seen in the emergency room since your last visit here?   no    5. Did the patient have a depression screening completed today?  No    No data recorded     PHQ-9 Given to (if applicable):               PHQ-9 Score (if applicable):                     [] Positive     []  Negative              Does question #9 need addressed (if applicable)                     [] Yes    []  No               Jenaro Tierney CMA

## 2021-12-07 NOTE — PROGRESS NOTES
Patient Name: Ezequiel Murillo  Patient : 1950  Patient MRN: [id number]     Primary Oncologist: Violeta Daley MD  PCP: Dr Argelia Valentin        Date of Service: 2021      Chief Complaint:   Chief Complaint   Patient presents with    Follow-up        Active Problem list  1. Polycythemia vera ([facility] [address]    2. Squamous cell carcinoma of skin, unspecified    3. B12 deficiency    4. Thrombocytopenia, unspecified ([address]    5. Intestinal malabsorption, unspecified type    6. Iron deficiency anemia, unspecified iron deficiency anemia type           HPI:        Referred in 2016 for polycythemia, CBC with wbc 13.6, Hb 14.4 hct 47.8, plt 513K  16 Jak2 postive  Was started on Hydrea as h/o MI needing CABG and also h/o TIA x 2, dose adjusted as needed. Then with SHAILA, had EGD, Cscope and VCE in may 2016  Cscope with diverticulosis and nonbleeding internal hemorrhoids  EGD with erythematous stomach but biopsy neg for h.pylori or malignancy  Capsule endoscopy with apthous ulcers in the small bowel. 18: Hydrea 1000mg OD    2018: Ct abdomen and pelvis:Impression  No renal or ureteral stone. No bladder stone. Distal left ureter does not completely opacify but otherwise appears  unremarkable. Otherwise no filling defect within the renal or ureteral  collecting systems. Mild irregularity along the posterior bladder wall which may be related to  ureterovesical junction bilaterally. Recommend further evaluation with  cystoscopy given hematuria.       19: CBC with wbc 11, Hb 20.2, hct 60, mcv 105, plt 171K  Creatinine 1.06, alk [id number]  ldh 296    3/12/19:CBC with wbc 11.9, Hb 15.6, hct 49.2, mcv 106, plt 384K  Ferritin 9  sats 7    2020 LDH of 149 CMP with sodium 138 potassium 4.6 glucose of 37 creatinine 1 calcium 10.3 ALT 8 AST 10 alk phos 115 CBC with WBC 11.7 hemoglobin 13.1 hematocrit 47.7 MCV of 101.7 platelets of 806      2020 EGD with Possible barrettes but biopsy negative     Had phlebotomy march 5 and 19 2021 April ASA and hydrea were held sec to cytopenia    4/18/21: Presented to ER with left jaw swelling and pain,which started 4/17/21. No fever. No bleeding. . 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  Parotid gland   appears normal.     6/29/2021 no DVT in the right lower extremity    9/20/2021 CBC with hemoglobin of 16.2 hematocrit of 50.9 platelets of 853, ferritin of 53    9/27/21: left bunionectomy    10/7/21: TIA sx.    10/4/21: CXR/CT head/CTA neck/head negative    PMH:   Polycythemia vera  Hypothyroidism  CAD  Lower back pain  HTN  HLP    PSH:  left rotator cuff repair 2017  Laminectomies x 2  cholecystectomy  hernia repair  removal of renal ca;culi  Total thyroidectomy  CABG  hemorrhoidectomy x 2  Hysterectomy  Lumpectomy left breast, non malignant  Eye lid surgery    Allergies: None    SH: Grand son lives with her. 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 FH: Brother has leukemia ?type. No other blood dyscrasias    Interval History  12/7/2021:Arrived alone to the clinic today. Is on hydrea 1000 mg per day. 12/7/21 had a phlebotomy. Is on oral iron 2 tablets OD. No chest pain. No increased sob, palpitation sor any dizziness. Has been having presyncopal episodes. Reported that she continues to be tired. Denied any fever, night sweats, lymphadenopathy. Denied any overt bleeding. Denied any abdominal pain, nausea, vomiting, diarrhea, constipation. Denied any lower extremity edema.       Review of Systems   Per interval history; otherwise 10 point ROS is negative              Vital Signs:  /73 (Site: Right Upper Arm, Position: Sitting, Cuff Size: Medium Adult)   Pulse 84   Temp 96.1 °F (35.6 °C) (Infrared)   Ht 5' 3\" (1.6 m)   Wt 166 lb 6.4 oz (75.5 kg)   SpO2 96%   BMI 29.48 kg/m²     Physical Exam:    CONSTITUTIONAL: awake, alert, overweight  EYES:No palor or icterus   ENT:NCAT  NECK: No JVD   HEMATOLOGIC/LYMPHATIC: no cervical, supraclavicular or axillary lymphadenopathy   LUNGS: CTAB, no wheeze   CARDIOVASCULAR:Sternal scar healed well. s1s2 rrr SM  ABDOMEN: soft ntnd bs pos, no hsm  NEUROLOGIC: GI   SKIN: AKs and echymosis, Scalp scar well-healed  EXTREMITIES: right LE edema, right foot in boot and ace wrap on.        Labs:    Hematology:  Lab Results   Component Value Date    WBC 11.4 (H) 11/30/2021    RBC 5.04 11/30/2021    HGB 18.0 (H) 11/30/2021    HCT 54.0 (H) 11/30/2021    .1 (H) 11/30/2021    MCH 35.7 (H) 11/30/2021    MCHC 33.3 11/30/2021    RDW 19.4 (H) 11/30/2021     11/30/2021    MPV 10.1 11/30/2021    BANDSPCT 3 (L) 10/11/2019    SEGSPCT 78.8 (H) 11/30/2021    EOSRELPCT 1.6 11/30/2021    BASOPCT 0.6 11/30/2021    LYMPHOPCT 15.2 (L) 11/30/2021    MONOPCT 3.8 11/30/2021    BANDABS 0.38 10/11/2019    SEGSABS 9.0 11/30/2021    EOSABS 0.2 11/30/2021    BASOSABS 0.1 11/30/2021    LYMPHSABS 1.7 11/30/2021    MONOSABS 0.4 11/30/2021    DIFFTYPE AUTOMATED DIFFERENTIAL 11/30/2021    ANISOCYTOSIS 1+ 12/27/2019    POLYCHROM 1+ 12/27/2019    WBCMORP FEW  ATYPICAL LYMPH(S) NOTED   12/23/2019    PLTM RARE LARGE PLATELETS SEEN ON SMEAR 10/11/2019     No results found for: ESR    Chemistry:  Lab Results   Component Value Date     11/30/2021     11/30/2021    K 5.2 (H) 11/30/2021    K 5.0 11/30/2021     11/30/2021     11/30/2021    CO2 29 11/30/2021    CO2 29 11/30/2021    BUN 10 11/30/2021    BUN 11 11/30/2021    CREATININE 0.9 11/30/2021    CREATININE 0.9 11/30/2021    GLUCOSE 106 (H) 11/30/2021    GLUCOSE 103 (H) 11/30/2021    CALCIUM 11.1 (H) 11/30/2021    CALCIUM 11.0 (H) 11/30/2021    PROT 7.2 11/30/2021    PROT 7.3 11/30/2021    LABALBU 4.7 11/30/2021    LABALBU 4.8 11/30/2021    BILITOT 0.6 11/30/2021    BILITOT 0.6 11/30/2021    ALKPHOS 156 (H) 11/30/2021    ALKPHOS 157 (H) 11/30/2021    AST 15 11/30/2021    AST 14 (L) 11/30/2021    ALT 11 11/30/2021    ALT 13 11/30/2021    LABGLOM >60 11/30/2021    LABGLOM >60 11/30/2021    GFRAA >60 11/30/2021    GFRAA >60 11/30/2021    MG 2.0 09/26/2015    POCCA 1.31 09/23/2015    POCGLU 108 (H) 10/04/2021     Lab Results   Component Value Date     (H) 11/30/2021     No components found for: LD  Lab Results   Component Value Date    TSHHS 2.720 05/06/2019    T4FREE 1.20 05/06/2019    FT3 2.8 11/08/2016       Immunology:  Lab Results   Component Value Date    PROT 7.2 11/30/2021    PROT 7.3 11/30/2021     No results found for: Tony Chiu, KLFLCR  No results found for: B2M    Coagulation Panel:  Lab Results   Component Value Date    PROTIME 20.2 (H) 10/04/2021    INR 1.76 10/04/2021    APTT 31.3 09/17/2020       Anemia Panel:  Lab Results   Component Value Date    RRDLFXLX78 >2000 (H) 11/30/2021    FOLATE >20.0 (H) 11/30/2021       Tumor Markers:  No results found for: , CEA, , LABCA2, PSA      Imaging: Reviewed     Pathology:Reviewed     Observations:  Performance Status: ECOG 0  Depression Status: No data recorded          Assessment & Plan:  PVera/Low ferritin: Low Erythropoetin and JAK2 + suggestive of polycythemia vera. Started Hydrea jan 2018, as H/O thrombosis(needing CABG) and also Possible TIA. leukocytosis in may 2019 was most probably sec to steroids, Flow at that point with left shift but otherwise normal.  CBC march 2021  with wbc 21.5, Hb 18.5, hct 61, mcv 78.4, platelet 004. Ferritin 27, sats 7% . Recommended Increasing hydrea to 1500mg OD, increase iron to PO bid and phlebotomy x 2. Continued ASA and adequate hydration. Note severe decline in wbc and platelet counts , consistent upon recheck CBC. Could be sec to increased hydrea dose, phlebotomy. Hydrea and ASA were held. No hemolysis.  FLOW with no evidence of acute leukemia  Resumed Hydrea and low-dose aspirin once counts were normalized, but aspirin was discontinued by cardiology and she does not want to resume it  Is currently on hydrea 1000 mg daily mg daily,   Will increase to 1500mg/1000mg alternate dosing  Phlebotomy  X 2 has also been ordered    Low b12:  continue only oral supplementation. Left sided facial/jaw swelling: CT MF as above. Resolved. ? etiology. Iron indices and ferritin were suggestive of jeff. Recent GI eval normal , no overt bleeding. Recommend oral iron twice daily, adequate bowel regimen. RLE edema: Ultrasound negative for any DVT. Had bunionectomy recently    TIA: Is on xarelto. LD ASA added. Phlebotomy to keep Hct <47%    Constipation: Recommend stool softener and laxatives as needed    Increased alk Po4: H/o cholecystectomy    Skin cancer, followed by surgery    Lipoma on the neck: Is being followed    Easy bruising: PT/PTT normal    Mild hypercalcemia: Hold off on calcium supplements for now and adequate hydration. Screening: Is uptodate on mammogram(2021 in [place] was normal)and colonoscopies. Dexa scan 2019 normal. No further pap smears. Discussed the above findings and plan with the pt. She verbalizes understanding. Answered all questions. Discussed healthy lifestyle including exercise and weight loss. Recommend follow up with PCP and other specialists. Please do not hesitate to call if you need any further information. She has received two  doses of COVID Vaccine. Flu vaccine not yet. RTC jan 14 2021. or earlier if new Sx. I have recommended that the patient follow CDC guidelines for prevention of COVID-19 infection.  Received COVID vaccine, booster, flu vaccine    KIM

## 2021-12-07 NOTE — PROGRESS NOTES
Diagnosis: POLYCYTHEMIA VERA    Pre-Phlebotomy: BP /69   Pulse 73   Temp 97.3 °F (36.3 °C) (Infrared)   Resp 16   SpO2 99%       Post-Phlebotomy: /92, HR 74    Volume Removed: 635 grams    Complications: NONE    Comments: TOLERATED PHLEBOTOMY WELL    /69   Pulse 73   Temp 97.3 °F (36.3 °C) (Infrared)   Resp 16   SpO2 99%       Lynda Ren RN

## 2021-12-07 NOTE — PROGRESS NOTES
Tolerated PHLEBOTOMY well. Reviewed discharge instruction, voiced understanding. Copies of AVS given. Pt discharged HOME. Pt to exit via AMBULATION. No orders of the defined types were placed in this encounter.

## 2021-12-21 ENCOUNTER — HOSPITAL ENCOUNTER (OUTPATIENT)
Dept: INFUSION THERAPY | Age: 71
Setting detail: INFUSION SERIES
Discharge: HOME | End: 2021-12-21
Payer: MEDICARE

## 2021-12-21 VITALS
HEART RATE: 90 BPM | TEMPERATURE: 96.1 F | RESPIRATION RATE: 18 BRPM | OXYGEN SATURATION: 99 % | SYSTOLIC BLOOD PRESSURE: 126 MMHG | DIASTOLIC BLOOD PRESSURE: 89 MMHG

## 2021-12-21 DIAGNOSIS — D45 POLYCYTHEMIA VERA (HCC): Primary | ICD-10-CM

## 2021-12-21 PROCEDURE — 99195 PHLEBOTOMY: CPT

## 2021-12-21 PROCEDURE — 99211 OFF/OP EST MAY X REQ PHY/QHP: CPT

## 2021-12-21 NOTE — PROGRESS NOTES
Diagnosis: Polycythemia Vera    Pre-Phlebotomy: BP BP (!) 154/79   Pulse 81   Temp 96.1 °F (35.6 °C) (Infrared)   Resp 18   SpO2 99%       Post-Phlebotomy: /72, HR 92    Volume Removed: 616 grams    Complications: None    Comments:  Tolerated Phlebotomy procedure well        LOT# [id number]    Exp: 6/22      Andrea Carrizales RN

## 2022-01-03 ENCOUNTER — HOSPITAL ENCOUNTER (OUTPATIENT)
Dept: INFUSION THERAPY | Age: 72
Setting detail: INFUSION SERIES
Discharge: HOME | End: 2022-01-03
Payer: MEDICARE

## 2022-01-03 VITALS
SYSTOLIC BLOOD PRESSURE: 133 MMHG | HEART RATE: 87 BPM | DIASTOLIC BLOOD PRESSURE: 77 MMHG | OXYGEN SATURATION: 100 % | RESPIRATION RATE: 16 BRPM | TEMPERATURE: 97 F

## 2022-01-03 DIAGNOSIS — D45 POLYCYTHEMIA VERA (HCC): Primary | ICD-10-CM

## 2022-01-03 PROCEDURE — 99211 OFF/OP EST MAY X REQ PHY/QHP: CPT

## 2022-01-03 PROCEDURE — 99195 PHLEBOTOMY: CPT

## 2022-01-12 ENCOUNTER — HOSPITAL ENCOUNTER (OUTPATIENT)
Dept: INFUSION THERAPY | Age: 72
Discharge: HOME | End: 2022-01-12
Payer: MEDICARE

## 2022-01-12 DIAGNOSIS — E53.8 B12 DEFICIENCY: ICD-10-CM

## 2022-01-12 DIAGNOSIS — D50.9 IRON DEFICIENCY ANEMIA, UNSPECIFIED IRON DEFICIENCY ANEMIA TYPE: ICD-10-CM

## 2022-01-12 DIAGNOSIS — D45 POLYCYTHEMIA VERA (HCC): ICD-10-CM

## 2022-01-12 DIAGNOSIS — C44.92 SQUAMOUS CELL CARCINOMA OF SKIN, UNSPECIFIED: ICD-10-CM

## 2022-01-12 DIAGNOSIS — K90.9 INTESTINAL MALABSORPTION, UNSPECIFIED TYPE: ICD-10-CM

## 2022-01-12 DIAGNOSIS — D69.6 THROMBOCYTOPENIA, UNSPECIFIED (HCC): ICD-10-CM

## 2022-01-12 LAB
ALBUMIN SERPL-MCNC: 4.3 GM/DL (ref 3.4–5)
ALP BLD-CCNC: 144 IU/L (ref 40–129)
ALT SERPL-CCNC: 13 U/L (ref 10–40)
ANION GAP SERPL CALCULATED.3IONS-SCNC: 11 MMOL/L (ref 4–16)
AST SERPL-CCNC: 14 IU/L (ref 15–37)
BASOPHILS ABSOLUTE: 0.1 K/CU MM
BASOPHILS RELATIVE PERCENT: 0.5 % (ref 0–1)
BILIRUB SERPL-MCNC: 0.7 MG/DL (ref 0–1)
BUN BLDV-MCNC: 10 MG/DL (ref 6–23)
CALCIUM SERPL-MCNC: 11 MG/DL (ref 8.3–10.6)
CHLORIDE BLD-SCNC: 100 MMOL/L (ref 99–110)
CO2: 25 MMOL/L (ref 21–32)
CREAT SERPL-MCNC: 0.9 MG/DL (ref 0.6–1.1)
DIFFERENTIAL TYPE: ABNORMAL
EOSINOPHILS ABSOLUTE: 0.2 K/CU MM
EOSINOPHILS RELATIVE PERCENT: 2.5 % (ref 0–3)
FERRITIN: 50 NG/ML (ref 15–150)
FOLATE: >20 NG/ML (ref 3.1–17.5)
GFR AFRICAN AMERICAN: >60 ML/MIN/1.73M2
GFR NON-AFRICAN AMERICAN: >60 ML/MIN/1.73M2
GLUCOSE BLD-MCNC: 115 MG/DL (ref 70–99)
HCT VFR BLD CALC: 48.7 % (ref 37–47)
HEMOGLOBIN: 16.3 GM/DL (ref 12.5–16)
IRON: 71 UG/DL (ref 37–145)
LYMPHOCYTES ABSOLUTE: 2 K/CU MM
LYMPHOCYTES RELATIVE PERCENT: 21.6 % (ref 24–44)
MCH RBC QN AUTO: 37.7 PG (ref 27–31)
MCHC RBC AUTO-ENTMCNC: 33.5 % (ref 32–36)
MCV RBC AUTO: 112.7 FL (ref 78–100)
MONOCYTES ABSOLUTE: 0.3 K/CU MM
MONOCYTES RELATIVE PERCENT: 3.7 % (ref 0–4)
PCT TRANSFERRIN: 23 % (ref 10–44)
PDW BLD-RTO: 14.4 % (ref 11.7–14.9)
PLATELET # BLD: 300 K/CU MM (ref 140–440)
PMV BLD AUTO: 9.8 FL (ref 7.5–11.1)
POTASSIUM SERPL-SCNC: 4.6 MMOL/L (ref 3.5–5.1)
RBC # BLD: 4.32 M/CU MM (ref 4.2–5.4)
SEGMENTED NEUTROPHILS ABSOLUTE COUNT: 6.6 K/CU MM
SEGMENTED NEUTROPHILS RELATIVE PERCENT: 71.7 % (ref 36–66)
SODIUM BLD-SCNC: 136 MMOL/L (ref 135–145)
TOTAL IRON BINDING CAPACITY: 313 UG/DL (ref 250–450)
TOTAL PROTEIN: 6.9 GM/DL (ref 6.4–8.2)
UNSATURATED IRON BINDING CAPACITY: 242 UG/DL (ref 110–370)
VITAMIN B-12: >2000 PG/ML (ref 211–911)
WBC # BLD: 9.2 K/CU MM (ref 4–10.5)

## 2022-01-12 PROCEDURE — 82746 ASSAY OF FOLIC ACID SERUM: CPT

## 2022-01-12 PROCEDURE — 36415 COLL VENOUS BLD VENIPUNCTURE: CPT

## 2022-01-12 PROCEDURE — 80053 COMPREHEN METABOLIC PANEL: CPT

## 2022-01-12 PROCEDURE — 82728 ASSAY OF FERRITIN: CPT

## 2022-01-12 PROCEDURE — 83550 IRON BINDING TEST: CPT

## 2022-01-12 PROCEDURE — 85025 COMPLETE CBC W/AUTO DIFF WBC: CPT

## 2022-01-12 PROCEDURE — 82607 VITAMIN B-12: CPT

## 2022-01-12 PROCEDURE — 83540 ASSAY OF IRON: CPT

## 2022-01-19 ENCOUNTER — OFFICE VISIT (OUTPATIENT)
Dept: ONCOLOGY | Age: 72
End: 2022-01-19
Payer: MEDICARE

## 2022-01-19 ENCOUNTER — HOSPITAL ENCOUNTER (OUTPATIENT)
Dept: INFUSION THERAPY | Age: 72
Discharge: HOME | End: 2022-01-19

## 2022-01-19 VITALS
DIASTOLIC BLOOD PRESSURE: 71 MMHG | RESPIRATION RATE: 16 BRPM | OXYGEN SATURATION: 99 % | WEIGHT: 169.4 LBS | HEIGHT: 63 IN | TEMPERATURE: 96 F | SYSTOLIC BLOOD PRESSURE: 138 MMHG | HEART RATE: 88 BPM | BODY MASS INDEX: 30.02 KG/M2

## 2022-01-19 DIAGNOSIS — C44.92 SQUAMOUS CELL CARCINOMA OF SKIN, UNSPECIFIED: ICD-10-CM

## 2022-01-19 DIAGNOSIS — D50.9 IRON DEFICIENCY ANEMIA, UNSPECIFIED IRON DEFICIENCY ANEMIA TYPE: ICD-10-CM

## 2022-01-19 DIAGNOSIS — E53.8 B12 DEFICIENCY: ICD-10-CM

## 2022-01-19 DIAGNOSIS — D68.9 COAGULOPATHY (HCC): ICD-10-CM

## 2022-01-19 DIAGNOSIS — D45 POLYCYTHEMIA VERA (HCC): Primary | ICD-10-CM

## 2022-01-19 DIAGNOSIS — D69.6 THROMBOCYTOPENIA, UNSPECIFIED (HCC): ICD-10-CM

## 2022-01-19 PROCEDURE — 1036F TOBACCO NON-USER: CPT | Performed by: INTERNAL MEDICINE

## 2022-01-19 PROCEDURE — 3017F COLORECTAL CA SCREEN DOC REV: CPT | Performed by: INTERNAL MEDICINE

## 2022-01-19 PROCEDURE — 4040F PNEUMOC VAC/ADMIN/RCVD: CPT | Performed by: INTERNAL MEDICINE

## 2022-01-19 PROCEDURE — 1123F ACP DISCUSS/DSCN MKR DOCD: CPT | Performed by: INTERNAL MEDICINE

## 2022-01-19 PROCEDURE — 1090F PRES/ABSN URINE INCON ASSESS: CPT | Performed by: INTERNAL MEDICINE

## 2022-01-19 PROCEDURE — G8417 CALC BMI ABV UP PARAM F/U: HCPCS | Performed by: INTERNAL MEDICINE

## 2022-01-19 PROCEDURE — G8484 FLU IMMUNIZE NO ADMIN: HCPCS | Performed by: INTERNAL MEDICINE

## 2022-01-19 PROCEDURE — G8400 PT W/DXA NO RESULTS DOC: HCPCS | Performed by: INTERNAL MEDICINE

## 2022-01-19 PROCEDURE — G8427 DOCREV CUR MEDS BY ELIG CLIN: HCPCS | Performed by: INTERNAL MEDICINE

## 2022-01-19 PROCEDURE — 99214 OFFICE O/P EST MOD 30 MIN: CPT | Performed by: INTERNAL MEDICINE

## 2022-01-19 ASSESSMENT — PATIENT HEALTH QUESTIONNAIRE - PHQ9
SUM OF ALL RESPONSES TO PHQ9 QUESTIONS 1 & 2: 1
SUM OF ALL RESPONSES TO PHQ QUESTIONS 1-9: 1
SUM OF ALL RESPONSES TO PHQ QUESTIONS 1-9: 1
1. LITTLE INTEREST OR PLEASURE IN DOING THINGS: 0
2. FEELING DOWN, DEPRESSED OR HOPELESS: 1
SUM OF ALL RESPONSES TO PHQ QUESTIONS 1-9: 1
SUM OF ALL RESPONSES TO PHQ QUESTIONS 1-9: 1

## 2022-01-19 NOTE — PROGRESS NOTES
MA Rooming Questions  Patient: Ernestina Durán  MRN: [id number]    Date: 1/19/2022        1. Do you have any new issues?   no         2. Do you need any refills on medications?    no    3. Have you had any imaging done since your last visit?   no    4. Have you been hospitalized or seen in the emergency room since your last visit here?   no    5. Did the patient have a depression screening completed today?  Yes    PHQ-9 Total Score: 1 (1/19/2022 11:28 AM)       PHQ-9 Given to (if applicable):               PHQ-9 Score (if applicable):                     [] Positive     []  Negative              Does question #9 need addressed (if applicable)                     [] Yes    []  No               Maile Diggs CMA

## 2022-01-19 NOTE — PROGRESS NOTES
Patient Name: Michael Perez  Patient : 1950  Patient MRN: [id number]     Primary Oncologist: Ashley Vaughan MD  PCP: Dr Gloria House        Date of Service: 2022      Chief Complaint:   Chief Complaint   Patient presents with    Discuss Labs        Active Problem list  1. Polycythemia vera ([facility] [address]    2. Squamous cell carcinoma of skin, unspecified    3. Iron deficiency anemia, unspecified iron deficiency anemia type    4. Thrombocytopenia, unspecified ([facility] [address]    5. B12 deficiency    6. Coagulopathy ([facility] [address]           HPI:        Referred in 2016 for polycythemia, CBC with wbc 13.6, Hb 14.4 hct 47.8, plt 513K  16 Jak2 postive  Was started on Hydrea as h/o MI needing CABG and also h/o TIA x 2, dose adjusted as needed. Then with SHAILA, had EGD, Cscope and VCE in may 2016  Cscope with diverticulosis and nonbleeding internal hemorrhoids  EGD with erythematous stomach but biopsy neg for h.pylori or malignancy  Capsule endoscopy with apthous ulcers in the small bowel. 18: Hydrea 1000mg OD    2018: Ct abdomen and pelvis:Impression  No renal or ureteral stone. No bladder stone. Distal left ureter does not completely opacify but otherwise appears  unremarkable. Otherwise no filling defect within the renal or ureteral  collecting systems. Mild irregularity along the posterior bladder wall which may be related to  ureterovesical junction bilaterally. Recommend further evaluation with  cystoscopy given hematuria.       19: CBC with wbc 11, Hb 20.2, hct 60, mcv 105, plt 171K  Creatinine 1.06, alk [id number]  ldh 296    3/12/19:CBC with wbc 11.9, Hb 15.6, hct 49.2, mcv 106, plt 384K  Ferritin 9  sats 7    2020 LDH of 149 CMP with sodium 138 potassium 4.6 glucose of 37 creatinine 1 calcium 10.3 ALT 8 AST 10 alk phos 115 CBC with WBC 11.7 hemoglobin 13.1 hematocrit 47.7 MCV of 101.7 platelets of 878      2020 EGD with Possible barrettes but biopsy negative     Had phlebotomy  and 2021 April ASA and hydrea were held sec to cytopenia    4/18/21: Presented to ER with left jaw swelling and pain,which started 4/17/21. No fever. No bleeding. . 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  Parotid gland   appears normal.     6/29/2021 no DVT in the right lower extremity    9/20/2021 CBC with hemoglobin of 16.2 hematocrit of 50.9 platelets of 363, ferritin of 53    9/27/21: left bunionectomy    10/7/21: TIA sx.    10/4/21: CXR/CT head/CTA neck/head negative    PMH:   Polycythemia vera  Hypothyroidism  CAD  Lower back pain  HTN  HLP    PSH:  left rotator cuff repair 2017  Laminectomies x 2  cholecystectomy  hernia repair  removal of renal ca;culi  Total thyroidectomy  CABG  hemorrhoidectomy x 2  Hysterectomy  Lumpectomy left breast, non malignant  Eye lid surgery    Allergies: None    SH: Grand son lives with her. 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 FH: Brother has leukemia ?type. No other blood dyscrasias    Interval History  1/19/2022:Arrived alone to the clinic today. Is on hydrea 1000mg alternating with 1500mg per day. Is on oral iron bid. No chest pain. No increased sob, palpitation sor any dizziness. Has been having presyncopal episodes less in frequency. No falls. Reported that she is not as tired. Denied any fever, night sweats, lymphadenopathy. Denied any overt bleeding. Denied any abdominal pain, nausea, vomiting, diarrhea, constipation.   Reported rt foot swelling, has bunionectomy end of September 2021      Review of Systems   Per interval history; otherwise 10 point ROS is negative              Vital Signs:  /71 (Site: Left Upper Arm, Position: Sitting, Cuff Size: Medium Adult)   Pulse 88   Temp 96 °F (35.6 °C) (Infrared)   Resp 16   Ht 5' 3\" (1.6 m)   Wt 169 lb 6.4 oz (76.8 kg)   SpO2 99%   BMI 30.01 kg/m²     Physical Exam:    CONSTITUTIONAL: awake, alert, overweight  EYES:No palor or icterus   ENT:NCAT  NECK: No JVD   HEMATOLOGIC/LYMPHATIC: no cervical, supraclavicular or axillary lymphadenopathy   LUNGS: CTAB, no wheeze   CARDIOVASCULAR:Sternal scar healed well. s1s2 rrr SM  ABDOMEN: soft ntnd bs pos, no hsm  NEUROLOGIC: GI   SKIN: AKs and echymosis, Scalp scar well-healed  EXTREMITIES: right foot edema, ttp, erythema      Labs:    Hematology:  Lab Results   Component Value Date    WBC 9.2 01/12/2022    RBC 4.32 01/12/2022    HGB 16.3 (H) 01/12/2022    HCT 48.7 (H) 01/12/2022    .7 (H) 01/12/2022    MCH 37.7 (H) 01/12/2022    MCHC 33.5 01/12/2022    RDW 14.4 01/12/2022     01/12/2022    MPV 9.8 01/12/2022    BANDSPCT 3 (L) 10/11/2019    SEGSPCT 71.7 (H) 01/12/2022    EOSRELPCT 2.5 01/12/2022    BASOPCT 0.5 01/12/2022    LYMPHOPCT 21.6 (L) 01/12/2022    MONOPCT 3.7 01/12/2022    BANDABS 0.38 10/11/2019    SEGSABS 6.6 01/12/2022    EOSABS 0.2 01/12/2022    BASOSABS 0.1 01/12/2022    LYMPHSABS 2.0 01/12/2022    MONOSABS 0.3 01/12/2022    DIFFTYPE AUTOMATED DIFFERENTIAL 01/12/2022    ANISOCYTOSIS 1+ 12/27/2019    POLYCHROM 1+ 12/27/2019    WBCMORP FEW  ATYPICAL LYMPH(S) NOTED   12/23/2019    PLTM RARE LARGE PLATELETS SEEN ON SMEAR 10/11/2019     No results found for: ESR    Chemistry:  Lab Results   Component Value Date     01/12/2022    K 4.6 01/12/2022     01/12/2022    CO2 25 01/12/2022    BUN 10 01/12/2022    CREATININE 0.9 01/12/2022    GLUCOSE 115 (H) 01/12/2022    CALCIUM 11.0 (H) 01/12/2022    PROT 6.9 01/12/2022    LABALBU 4.3 01/12/2022    BILITOT 0.7 01/12/2022    ALKPHOS 144 (H) 01/12/2022    AST 14 (L) 01/12/2022    ALT 13 01/12/2022    LABGLOM >60 01/12/2022    GFRAA >60 01/12/2022    MG 2.0 09/26/2015    POCCA 1.31 09/23/2015    POCGLU 108 (H) 10/04/2021     Lab Results   Component Value Date     (H) 11/30/2021     No components found for: LD  Lab Results   Component Value Date TSHHS 2.720 05/06/2019    T4FREE 1.20 05/06/2019    FT3 2.8 11/08/2016       Immunology:  Lab Results   Component Value Date    PROT 6.9 01/12/2022     No results found for: Veria Clock, KLFLCR  No results found for: B2M    Coagulation Panel:  Lab Results   Component Value Date    PROTIME 20.2 (H) 10/04/2021    INR 1.76 10/04/2021    APTT 31.3 09/17/2020       Anemia Panel:  Lab Results   Component Value Date    FWYXRKCM56 >2000 (H) 01/12/2022    FOLATE >20.0 (H) 01/12/2022       Tumor Markers:  No results found for: , CEA, , LABCA2, PSA      Imaging: Reviewed     Pathology:Reviewed     Observations:  Performance Status: ECOG 0  Depression Status: PHQ-9 Total Score: 1 (1/19/2022 11:28 AM)          Assessment & Plan:  PVera/Low ferritin: Low Erythropoetin and JAK2 + suggestive of polycythemia vera. Started Hydrea jan 2018, as H/O thrombosis(needing CABG) and also Possible TIA. leukocytosis in may 2019 was most probably sec to steroids, Flow at that point with left shift but otherwise normal.  CBC march 2021  with wbc 21.5, Hb 18.5, hct 61, mcv 78.4, platelet 726. Ferritin 27, sats 7% . Recommended Increasing hydrea to 1500mg OD, increase iron to PO bid and phlebotomy x 2. Continued ASA and adequate hydration. Note severe decline in wbc and platelet counts , consistent upon recheck CBC. Could be sec to increased hydrea dose, phlebotomy. Hydrea and ASA were held. No hemolysis. FLOW with no evidence of acute leukemia  Resumed Hydrea and low-dose aspirin once counts were normalized  Continue 1000mg/alternating with 1500mg  Phlebotomy to keep hct <45%, she wishes to skip this time    Low b12:  continue only oral supplementation. Left sided facial/jaw swelling: CT MF as above. Resolved. ? etiology. Iron indices and ferritin were suggestive of jeff. Recent GI eval normal , no overt bleeding. Recommend continuation of oral iron twice daily, adequate bowel regimen.      RLE /foot edema: Ultrasound negative for any DVT. Had bunionectomy rt foot in september 2021. Recommend follow up with podiatry    TIA: Is on xarelto. LD ASA added back again. Phlebotomy to keep Hct <47%    Constipation: Recommend stool softener and laxatives as needed    Increased alk Po4: H/o cholecystectomy    Skin cancer, followed by surgery/dermatology    Lipoma on the neck: Is being followed    Easy bruising: PT/PTT normal    Mild hypercalcemia: Hold off on calcium supplements for now and adequate hydration. Screening: Is uptodate on mammogram(2021 in [place] was normal)and colonoscopies. Dexa scan 2019 normal. No further pap smears. Discussed the above findings and plan with the pt. She verbalizes understanding. Answered all questions. Discussed healthy lifestyle including exercise and weight loss. Recommend follow up with PCP and other specialists. Please do not hesitate to call if you need any further information. She has received two  doses of COVID Vaccine. Flu vaccine not yet. RTC feb 2022   or earlier if new Sx. I have recommended that the patient follow CDC guidelines for prevention of COVID-19 infection.  Received COVID vaccine, booster, flu vaccine    KIM

## 2022-02-11 ENCOUNTER — HOSPITAL ENCOUNTER (OUTPATIENT)
Dept: INFUSION THERAPY | Age: 72
Discharge: HOME | End: 2022-02-11
Payer: MEDICARE

## 2022-02-11 DIAGNOSIS — D68.9 COAGULOPATHY (HCC): ICD-10-CM

## 2022-02-11 DIAGNOSIS — D50.9 IRON DEFICIENCY ANEMIA, UNSPECIFIED IRON DEFICIENCY ANEMIA TYPE: ICD-10-CM

## 2022-02-11 DIAGNOSIS — D45 POLYCYTHEMIA VERA (HCC): ICD-10-CM

## 2022-02-11 DIAGNOSIS — C44.92 SQUAMOUS CELL CARCINOMA OF SKIN, UNSPECIFIED: ICD-10-CM

## 2022-02-11 DIAGNOSIS — D69.6 THROMBOCYTOPENIA, UNSPECIFIED (HCC): ICD-10-CM

## 2022-02-11 DIAGNOSIS — E53.8 B12 DEFICIENCY: ICD-10-CM

## 2022-02-11 LAB
ALBUMIN SERPL-MCNC: 3.8 GM/DL (ref 3.4–5)
ALP BLD-CCNC: 128 IU/L (ref 40–129)
ALT SERPL-CCNC: 10 U/L (ref 10–40)
ANION GAP SERPL CALCULATED.3IONS-SCNC: 10 MMOL/L (ref 4–16)
AST SERPL-CCNC: 10 IU/L (ref 15–37)
BASOPHILS ABSOLUTE: 0.1 K/CU MM
BASOPHILS RELATIVE PERCENT: 0.5 % (ref 0–1)
BILIRUB SERPL-MCNC: 0.6 MG/DL (ref 0–1)
BUN BLDV-MCNC: 14 MG/DL (ref 6–23)
CALCIUM SERPL-MCNC: 9.8 MG/DL (ref 8.3–10.6)
CHLORIDE BLD-SCNC: 103 MMOL/L (ref 99–110)
CO2: 26 MMOL/L (ref 21–32)
CREAT SERPL-MCNC: 1 MG/DL (ref 0.6–1.1)
DIFFERENTIAL TYPE: ABNORMAL
EOSINOPHILS ABSOLUTE: 0.2 K/CU MM
EOSINOPHILS RELATIVE PERCENT: 2.2 % (ref 0–3)
FERRITIN: 27 NG/ML (ref 15–150)
GFR AFRICAN AMERICAN: >60 ML/MIN/1.73M2
GFR NON-AFRICAN AMERICAN: 55 ML/MIN/1.73M2
GLUCOSE BLD-MCNC: 133 MG/DL (ref 70–99)
HCT VFR BLD CALC: 46.9 % (ref 37–47)
HEMOGLOBIN: 16.4 GM/DL (ref 12.5–16)
IRON: 75 UG/DL (ref 37–145)
LACTATE DEHYDROGENASE: 150 IU/L (ref 120–246)
LYMPHOCYTES ABSOLUTE: 1.6 K/CU MM
LYMPHOCYTES RELATIVE PERCENT: 16.3 % (ref 24–44)
MCH RBC QN AUTO: 38.8 PG (ref 27–31)
MCHC RBC AUTO-ENTMCNC: 35 % (ref 32–36)
MCV RBC AUTO: 110.9 FL (ref 78–100)
MONOCYTES ABSOLUTE: 0.2 K/CU MM
MONOCYTES RELATIVE PERCENT: 2.5 % (ref 0–4)
PCT TRANSFERRIN: 32 % (ref 10–44)
PDW BLD-RTO: 13.5 % (ref 11.7–14.9)
PLATELET # BLD: 165 K/CU MM (ref 140–440)
PMV BLD AUTO: 10.4 FL (ref 7.5–11.1)
POTASSIUM SERPL-SCNC: 3.9 MMOL/L (ref 3.5–5.1)
RBC # BLD: 4.23 M/CU MM (ref 4.2–5.4)
SEGMENTED NEUTROPHILS ABSOLUTE COUNT: 7.5 K/CU MM
SEGMENTED NEUTROPHILS RELATIVE PERCENT: 78.5 % (ref 36–66)
SODIUM BLD-SCNC: 139 MMOL/L (ref 135–145)
TOTAL IRON BINDING CAPACITY: 238 UG/DL (ref 250–450)
TOTAL PROTEIN: 6.2 GM/DL (ref 6.4–8.2)
UNSATURATED IRON BINDING CAPACITY: 163 UG/DL (ref 110–370)
WBC # BLD: 9.5 K/CU MM (ref 4–10.5)

## 2022-02-11 PROCEDURE — 83540 ASSAY OF IRON: CPT

## 2022-02-11 PROCEDURE — 80053 COMPREHEN METABOLIC PANEL: CPT

## 2022-02-11 PROCEDURE — 82728 ASSAY OF FERRITIN: CPT

## 2022-02-11 PROCEDURE — 36415 COLL VENOUS BLD VENIPUNCTURE: CPT

## 2022-02-11 PROCEDURE — 83550 IRON BINDING TEST: CPT

## 2022-02-11 PROCEDURE — 83615 LACTATE (LD) (LDH) ENZYME: CPT

## 2022-02-11 PROCEDURE — 85025 COMPLETE CBC W/AUTO DIFF WBC: CPT

## 2022-02-18 ENCOUNTER — OFFICE VISIT (OUTPATIENT)
Dept: ONCOLOGY | Age: 72
End: 2022-02-18
Payer: MEDICARE

## 2022-02-18 ENCOUNTER — HOSPITAL ENCOUNTER (OUTPATIENT)
Dept: INFUSION THERAPY | Age: 72
Discharge: HOME | End: 2022-02-18

## 2022-02-18 VITALS
SYSTOLIC BLOOD PRESSURE: 169 MMHG | BODY MASS INDEX: 30.44 KG/M2 | HEIGHT: 63 IN | WEIGHT: 171.8 LBS | DIASTOLIC BLOOD PRESSURE: 87 MMHG | OXYGEN SATURATION: 95 % | TEMPERATURE: 97.5 F | RESPIRATION RATE: 18 BRPM | HEART RATE: 67 BPM

## 2022-02-18 DIAGNOSIS — E53.8 B12 DEFICIENCY: ICD-10-CM

## 2022-02-18 DIAGNOSIS — D45 POLYCYTHEMIA VERA (HCC): Primary | ICD-10-CM

## 2022-02-18 DIAGNOSIS — C44.92 SQUAMOUS CELL CARCINOMA OF SKIN, UNSPECIFIED: ICD-10-CM

## 2022-02-18 DIAGNOSIS — K90.9 INTESTINAL MALABSORPTION, UNSPECIFIED TYPE: ICD-10-CM

## 2022-02-18 DIAGNOSIS — D69.6 THROMBOCYTOPENIA, UNSPECIFIED (HCC): ICD-10-CM

## 2022-02-18 DIAGNOSIS — D50.9 IRON DEFICIENCY ANEMIA, UNSPECIFIED IRON DEFICIENCY ANEMIA TYPE: ICD-10-CM

## 2022-02-18 PROCEDURE — G8417 CALC BMI ABV UP PARAM F/U: HCPCS | Performed by: INTERNAL MEDICINE

## 2022-02-18 PROCEDURE — G8400 PT W/DXA NO RESULTS DOC: HCPCS | Performed by: INTERNAL MEDICINE

## 2022-02-18 PROCEDURE — 1036F TOBACCO NON-USER: CPT | Performed by: INTERNAL MEDICINE

## 2022-02-18 PROCEDURE — 4040F PNEUMOC VAC/ADMIN/RCVD: CPT | Performed by: INTERNAL MEDICINE

## 2022-02-18 PROCEDURE — G8484 FLU IMMUNIZE NO ADMIN: HCPCS | Performed by: INTERNAL MEDICINE

## 2022-02-18 PROCEDURE — 1123F ACP DISCUSS/DSCN MKR DOCD: CPT | Performed by: INTERNAL MEDICINE

## 2022-02-18 PROCEDURE — G8427 DOCREV CUR MEDS BY ELIG CLIN: HCPCS | Performed by: INTERNAL MEDICINE

## 2022-02-18 PROCEDURE — 3017F COLORECTAL CA SCREEN DOC REV: CPT | Performed by: INTERNAL MEDICINE

## 2022-02-18 PROCEDURE — 1090F PRES/ABSN URINE INCON ASSESS: CPT | Performed by: INTERNAL MEDICINE

## 2022-02-18 PROCEDURE — 99214 OFFICE O/P EST MOD 30 MIN: CPT | Performed by: INTERNAL MEDICINE

## 2022-02-18 NOTE — PROGRESS NOTES
Patient Name: Sigrid Gunderson  Patient : 1950  Patient MRN: [id number]     Primary Oncologist: Duane Hughes MD  PCP: Dr Gerda Gibbs        Date of Service: 2022      Chief Complaint:   Chief Complaint   Patient presents with    Follow-up        Active Problem list  1. Polycythemia vera ([facility] [address]    2. Squamous cell carcinoma of skin, unspecified    3. Thrombocytopenia, unspecified ([address]    4. B12 deficiency    5. Iron deficiency anemia, unspecified iron deficiency anemia type    6. Intestinal malabsorption, unspecified type           HPI:        Referred in 2016 for polycythemia, CBC with wbc 13.6, Hb 14.4 hct 47.8, plt 513K  16 Jak2 postive  Was started on Hydrea as h/o MI needing CABG and also h/o TIA x 2, dose adjusted as needed. Then with SHAILA, had EGD, Cscope and VCE in may 2016  Cscope with diverticulosis and nonbleeding internal hemorrhoids  EGD with erythematous stomach but biopsy neg for h.pylori or malignancy  Capsule endoscopy with apthous ulcers in the small bowel. 18: Hydrea 1000mg OD    2018: Ct abdomen and pelvis:Impression  No renal or ureteral stone. No bladder stone. Distal left ureter does not completely opacify but otherwise appears  unremarkable. Otherwise no filling defect within the renal or ureteral  collecting systems. Mild irregularity along the posterior bladder wall which may be related to  ureterovesical junction bilaterally. Recommend further evaluation with  cystoscopy given hematuria.       19: CBC with wbc 11, Hb 20.2, hct 60, mcv 105, plt 171K  Creatinine 1.06, alk [id number]  ldh 296    3/12/19:CBC with wbc 11.9, Hb 15.6, hct 49.2, mcv 106, plt 384K  Ferritin 9  sats 7    2020 LDH of 149 CMP with sodium 138 potassium 4.6 glucose of 37 creatinine 1 calcium 10.3 ALT 8 AST 10 alk phos 115 CBC with WBC 11.7 hemoglobin 13.1 hematocrit 47.7 MCV of 101.7 platelets of 422      2020 EGD with Possible barrettes but biopsy negative     Had phlebotomy march 5 and 19 2021 April ASA and hydrea were held sec to cytopenia    4/18/21: Presented to ER with left jaw swelling and pain,which started 4/17/21. No fever. No bleeding. . 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  Parotid gland   appears normal.     6/29/2021 no DVT in the right lower extremity    9/20/2021 CBC with hemoglobin of 16.2 hematocrit of 50.9 platelets of 846, ferritin of 53    9/27/21: left bunionectomy    10/7/21: TIA sx.    10/4/21: CXR/CT head/CTA neck/head negative    PMH:   Polycythemia vera  Hypothyroidism  CAD  Lower back pain  HTN  HLP    PSH:  left rotator cuff repair 2017  Laminectomies x 2  cholecystectomy  hernia repair  removal of renal ca;culi  Total thyroidectomy  CABG  hemorrhoidectomy x 2  Hysterectomy  Lumpectomy left breast, non malignant  Eye lid surgery    Allergies: None    SH: Grand son lives with her. 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 FH: Brother has leukemia ?type. No other blood dyscrasias    Interval History  2/18/2022:Arrived alone to the clinic today. Is on hydrea 1000mg alternating with 1500mg per day. Is on oral iron bid. Intermittent substernal chest pain, is followed by cardiology. Fibromyalgia is bothering lately. No increased sob, palpitations or any dizziness. Dizzy episodes fading. No falls. Reported that she is not as tired. Denied any fever, night sweats, lymphadenopathy. Denied any overt bleeding. Denied any abdominal pain, nausea, vomiting, diarrhea, constipation. Gained weight again.  Right ankle hurts      Review of Systems   Per interval history; otherwise 10 point ROS is negative              Vital Signs:  BP (!) 169/87 (Site: Left Upper Arm, Position: Sitting, Cuff Size: Large Adult)   Pulse 67   Temp 97.5 °F (36.4 °C) (Temporal)   Resp 18   Ht 5' 3\" (1.6 m)   Wt 171 lb 12.8 oz (77.9 kg)   SpO2 95%   BMI 30.43 kg/m²     Physical Exam:    CONSTITUTIONAL: awake, alert, overweight  EYES:No palor or icterus   ENT:NCAT  NECK: No JVD   HEMATOLOGIC/LYMPHATIC: no cervical, supraclavicular or axillary lymphadenopathy   LUNGS: CTAB, no wheeze   CARDIOVASCULAR:Sternal scar healed well. s1s2 rrr SM  ABDOMEN: soft ntnd bs pos, no hsm  NEUROLOGIC: GI   SKIN: AKs , Scalp scar well-healed  EXTREMITIES:      Labs:    Hematology:  Lab Results   Component Value Date    WBC 9.5 02/11/2022    RBC 4.23 02/11/2022    HGB 16.4 (H) 02/11/2022    HCT 46.9 02/11/2022    .9 (H) 02/11/2022    MCH 38.8 (H) 02/11/2022    MCHC 35.0 02/11/2022    RDW 13.5 02/11/2022     02/11/2022    MPV 10.4 02/11/2022    BANDSPCT 3 (L) 10/11/2019    SEGSPCT 78.5 (H) 02/11/2022    EOSRELPCT 2.2 02/11/2022    BASOPCT 0.5 02/11/2022    LYMPHOPCT 16.3 (L) 02/11/2022    MONOPCT 2.5 02/11/2022    BANDABS 0.38 10/11/2019    SEGSABS 7.5 02/11/2022    EOSABS 0.2 02/11/2022    BASOSABS 0.1 02/11/2022    LYMPHSABS 1.6 02/11/2022    MONOSABS 0.2 02/11/2022    DIFFTYPE AUTOMATED DIFFERENTIAL 02/11/2022    ANISOCYTOSIS 1+ 12/27/2019    POLYCHROM 1+ 12/27/2019    WBCMORP FEW  ATYPICAL LYMPH(S) NOTED   12/23/2019    PLTM RARE LARGE PLATELETS SEEN ON SMEAR 10/11/2019     No results found for: ESR    Chemistry:  Lab Results   Component Value Date     02/11/2022    K 3.9 02/11/2022     02/11/2022    CO2 26 02/11/2022    BUN 14 02/11/2022    CREATININE 1.0 02/11/2022    GLUCOSE 133 (H) 02/11/2022    CALCIUM 9.8 02/11/2022    PROT 6.2 (L) 02/11/2022    LABALBU 3.8 02/11/2022    BILITOT 0.6 02/11/2022    ALKPHOS 128 02/11/2022    AST 10 (L) 02/11/2022    ALT 10 02/11/2022    LABGLOM 55 (L) 02/11/2022    GFRAA >60 02/11/2022    MG 2.0 09/26/2015    POCCA 1.31 09/23/2015    POCGLU 108 (H) 10/04/2021     Lab Results   Component Value Date     02/11/2022     No components found for: LD  Lab Results   Component Value Date    TSHHS 2.720 05/06/2019    T4FREE 1.20 05/06/2019    FT3 2.8 11/08/2016       Immunology:  Lab Results   Component Value Date    PROT 6.2 (L) 02/11/2022     No results found for: Assunta Medico, KLFLCR  No results found for: B2M    Coagulation Panel:  Lab Results   Component Value Date    PROTIME 20.2 (H) 10/04/2021    INR 1.76 10/04/2021    APTT 31.3 09/17/2020       Anemia Panel:  Lab Results   Component Value Date    ZBRKDSJV12 >2000 (H) 01/12/2022    FOLATE >20.0 (H) 01/12/2022       Tumor Markers:  No results found for: , CEA, , LABCA2, PSA      Imaging: Reviewed     Pathology:Reviewed     Observations:  Performance Status: ECOG 0  Depression Status: No data recorded          Assessment & Plan:  PVera/Low ferritin: Low Erythropoetin and JAK2 + suggestive of polycythemia vera. Started Hydrea jan 2018, as H/O thrombosis(needing CABG) and also Possible TIA. leukocytosis in may 2019 was most probably sec to steroids, Flow at that point with left shift but otherwise normal.  CBC march 2021  with wbc 21.5, Hb 18.5, hct 61, mcv 78.4, platelet 519. Ferritin 27, sats 7% . Recommended Increasing hydrea to 1500mg OD, increase iron to PO bid and phlebotomy x 2. Continued ASA and adequate hydration. Note severe decline in wbc and platelet counts , consistent upon recheck CBC. Could be sec to increased hydrea dose, phlebotomy. Hydrea and ASA were held. No hemolysis. FLOW with no evidence of acute leukemia  Resumed Hydrea and low-dose aspirin once counts were normalized  Change to hydra 1000mg daily as platelet counts declining  Phlebotomy to keep hct <46%, she wishes to skip this time as well    HTN: Recommend maintaining a log. Follow up with PCP. Compliant with medication. Salt restriction, exercise and weight loss    Low b12:  continue only oral supplementation. Left sided facial/jaw swelling: CT MF as above. Resolved. ? etiology. Iron indices and ferritin were suggestive of jeff. Recent GI eval normal in 2021, no overt bleeding. Recommend continuation of oral iron twice daily, adequate bowel regimen. RLE /foot edema: Ultrasound negative for any DVT. Had bunionectomy rt foot in september 2021. Recommend follow up with podiatry    TIA: Is on xarelto. LD ASA added back again. Phlebotomy to keep Hct <46%    Constipation: Recommend stool softener and laxatives as needed    Increased alk Po4: H/o cholecystectomy    Skin cancer, followed by surgery/dermatology    Lipoma on the neck: Is being followed     Easy bruising: PT/PTT normal    Mild hypercalcemia:Recommended holding off on calcium supplements , repeat ca feb 2022 normal    Screening: Is uptodate on mammogram(2021 in [place] was normal)and colonoscopies. Dexa scan 2019 normal. No further pap smears. Discussed the above findings and plan with the pt. She verbalizes understanding. Answered all questions. Discussed healthy lifestyle including exercise and weight loss. Recommend follow up with PCP and other specialists. Please do not hesitate to call if you need any further information. RTC may 2022 or earlier if new Sx. I have recommended that the patient follow CDC guidelines for prevention of COVID-19 infection.  Received COVID vaccine, booster, flu vaccine    KIM

## 2022-02-18 NOTE — PROGRESS NOTES
MA Rooming Questions  Patient: Corine Montgomery  MRN: [id number]    Date: 2/18/2022        1. Do you have any new issues?   no         2. Do you need any refills on medications? yes - Hydrea    3. Have you had any imaging done since your last visit?   no    4. Have you been hospitalized or seen in the emergency room since your last visit here?   no    5. Did the patient have a depression screening completed today?  No    No data recorded     PHQ-9 Given to (if applicable):               PHQ-9 Score (if applicable):                     [] Positive     []  Negative              Does question #9 need addressed (if applicable)                     [] Yes    []  No               Josefina Laurent CMA

## 2022-04-01 ENCOUNTER — HOSPITAL ENCOUNTER (OUTPATIENT)
Dept: INFUSION THERAPY | Age: 72
Discharge: HOME | End: 2022-04-01
Payer: MEDICARE

## 2022-04-01 DIAGNOSIS — C44.92 SQUAMOUS CELL CARCINOMA OF SKIN, UNSPECIFIED: ICD-10-CM

## 2022-04-01 DIAGNOSIS — D45 POLYCYTHEMIA VERA (HCC): Primary | ICD-10-CM

## 2022-04-01 DIAGNOSIS — E53.8 B12 DEFICIENCY: ICD-10-CM

## 2022-04-01 DIAGNOSIS — D45 POLYCYTHEMIA VERA (HCC): ICD-10-CM

## 2022-04-01 DIAGNOSIS — D69.6 THROMBOCYTOPENIA, UNSPECIFIED (HCC): ICD-10-CM

## 2022-04-01 LAB
BASOPHILS ABSOLUTE: 0.1 K/CU MM
BASOPHILS RELATIVE PERCENT: 0.4 % (ref 0–1)
DIFFERENTIAL TYPE: ABNORMAL
EOSINOPHILS ABSOLUTE: 0.2 K/CU MM
EOSINOPHILS RELATIVE PERCENT: 1.6 % (ref 0–3)
HCT VFR BLD CALC: 59.8 % (ref 37–47)
HEMOGLOBIN: 19 GM/DL (ref 12.5–16)
LYMPHOCYTES ABSOLUTE: 2 K/CU MM
LYMPHOCYTES RELATIVE PERCENT: 14.1 % (ref 24–44)
MCH RBC QN AUTO: 34.6 PG (ref 27–31)
MCHC RBC AUTO-ENTMCNC: 31.8 % (ref 32–36)
MCV RBC AUTO: 108.9 FL (ref 78–100)
MONOCYTES ABSOLUTE: 0.4 K/CU MM
MONOCYTES RELATIVE PERCENT: 3 % (ref 0–4)
PDW BLD-RTO: 14.2 % (ref 11.7–14.9)
PLATELET # BLD: 198 K/CU MM (ref 140–440)
PMV BLD AUTO: 10.3 FL (ref 7.5–11.1)
RBC # BLD: 5.49 M/CU MM (ref 4.2–5.4)
SEGMENTED NEUTROPHILS ABSOLUTE COUNT: 11.6 K/CU MM
SEGMENTED NEUTROPHILS RELATIVE PERCENT: 80.9 % (ref 36–66)
WBC # BLD: 14.4 K/CU MM (ref 4–10.5)

## 2022-04-01 PROCEDURE — 36415 COLL VENOUS BLD VENIPUNCTURE: CPT

## 2022-04-01 PROCEDURE — 85025 COMPLETE CBC W/AUTO DIFF WBC: CPT

## 2022-04-01 NOTE — PROGRESS NOTES
Therapeutic phlebotomy ordered (Withdraw 500 mL x2 occurrences 3 weeks apart) per physician request. Patient will need CBC after 2nd phlebotomy.

## 2022-04-04 ENCOUNTER — CLINICAL DOCUMENTATION (OUTPATIENT)
Dept: ONCOLOGY | Age: 72
End: 2022-04-04

## 2022-04-04 ENCOUNTER — HOSPITAL ENCOUNTER (OUTPATIENT)
Dept: INFUSION THERAPY | Age: 72
Setting detail: INFUSION SERIES
Discharge: HOME | End: 2022-04-04
Payer: MEDICARE

## 2022-04-04 VITALS
DIASTOLIC BLOOD PRESSURE: 77 MMHG | OXYGEN SATURATION: 97 % | RESPIRATION RATE: 16 BRPM | TEMPERATURE: 97.3 F | SYSTOLIC BLOOD PRESSURE: 127 MMHG | HEART RATE: 104 BPM

## 2022-04-04 PROCEDURE — 99195 PHLEBOTOMY: CPT

## 2022-04-04 PROCEDURE — 99211 OFF/OP EST MAY X REQ PHY/QHP: CPT

## 2022-04-04 RX ORDER — 0.9 % SODIUM CHLORIDE 0.9 %
500 INTRAVENOUS SOLUTION INTRAVENOUS ONCE
Status: CANCELLED | OUTPATIENT
Start: 2022-04-04 | End: 2022-04-04

## 2022-04-04 RX ORDER — 0.9 % SODIUM CHLORIDE 0.9 %
250 INTRAVENOUS SOLUTION INTRAVENOUS ONCE
Status: CANCELLED | OUTPATIENT
Start: 2022-04-04 | End: 2022-04-04

## 2022-04-04 NOTE — PROGRESS NOTES
Diagnosis: POLYCYTHEMIA    Pre-Phlebotomy: /94, HR 94, RESP 16, TEMP 97.3    Post-Phlebotomy: /77,     Volume Removed: 072 grams    Complications: NONE    Comments: TOLERATED PHLEBOTOMY WELL            LOT# [id number]    Exp: 5-23      Ya Duran RN

## 2022-04-25 ENCOUNTER — HOSPITAL ENCOUNTER (OUTPATIENT)
Dept: INFUSION THERAPY | Age: 72
Setting detail: INFUSION SERIES
Discharge: HOME | End: 2022-04-25
Payer: MEDICARE

## 2022-04-25 VITALS
OXYGEN SATURATION: 96 % | RESPIRATION RATE: 16 BRPM | DIASTOLIC BLOOD PRESSURE: 78 MMHG | SYSTOLIC BLOOD PRESSURE: 132 MMHG | TEMPERATURE: 97.3 F | HEART RATE: 85 BPM

## 2022-04-25 PROCEDURE — 99211 OFF/OP EST MAY X REQ PHY/QHP: CPT

## 2022-04-25 PROCEDURE — 99195 PHLEBOTOMY: CPT

## 2022-04-25 RX ORDER — 0.9 % SODIUM CHLORIDE 0.9 %
250 INTRAVENOUS SOLUTION INTRAVENOUS ONCE
Status: CANCELLED | OUTPATIENT
Start: 2022-04-25 | End: 2022-04-25

## 2022-04-25 RX ORDER — 0.9 % SODIUM CHLORIDE 0.9 %
500 INTRAVENOUS SOLUTION INTRAVENOUS ONCE
Status: CANCELLED | OUTPATIENT
Start: 2022-04-25 | End: 2022-04-25

## 2022-04-25 NOTE — PROGRESS NOTES
Diagnosis: high iron    Pre-Phlebotomy: /87, HR 82    Post-Phlebotomy: /78, HR 96    Volume Removed: 295 grams    Complications: none    Comments: none      [facility], RN

## 2022-05-13 ENCOUNTER — HOSPITAL ENCOUNTER (OUTPATIENT)
Dept: INFUSION THERAPY | Age: 72
Discharge: HOME | End: 2022-05-13
Payer: MEDICARE

## 2022-05-13 DIAGNOSIS — K90.9 INTESTINAL MALABSORPTION, UNSPECIFIED TYPE: ICD-10-CM

## 2022-05-13 DIAGNOSIS — D50.9 IRON DEFICIENCY ANEMIA, UNSPECIFIED IRON DEFICIENCY ANEMIA TYPE: ICD-10-CM

## 2022-05-13 DIAGNOSIS — D45 POLYCYTHEMIA VERA (HCC): ICD-10-CM

## 2022-05-13 DIAGNOSIS — C44.92 SQUAMOUS CELL CARCINOMA OF SKIN, UNSPECIFIED: ICD-10-CM

## 2022-05-13 DIAGNOSIS — D69.6 THROMBOCYTOPENIA, UNSPECIFIED (HCC): ICD-10-CM

## 2022-05-13 DIAGNOSIS — E53.8 B12 DEFICIENCY: ICD-10-CM

## 2022-05-13 LAB
BASOPHILS ABSOLUTE: 0.1 K/CU MM
BASOPHILS RELATIVE PERCENT: 0.6 % (ref 0–1)
DIFFERENTIAL TYPE: ABNORMAL
EOSINOPHILS ABSOLUTE: 0.3 K/CU MM
EOSINOPHILS RELATIVE PERCENT: 2.1 % (ref 0–3)
FERRITIN: 25 NG/ML (ref 15–150)
HCT VFR BLD CALC: 53.9 % (ref 37–47)
HEMOGLOBIN: 16.6 GM/DL (ref 12.5–16)
IRON: 47 UG/DL (ref 37–145)
LACTATE DEHYDROGENASE: 207 IU/L (ref 120–246)
LYMPHOCYTES ABSOLUTE: 1.5 K/CU MM
LYMPHOCYTES RELATIVE PERCENT: 12.2 % (ref 24–44)
MCH RBC QN AUTO: 34 PG (ref 27–31)
MCHC RBC AUTO-ENTMCNC: 30.8 % (ref 32–36)
MCV RBC AUTO: 110.5 FL (ref 78–100)
MONOCYTES ABSOLUTE: 0.4 K/CU MM
MONOCYTES RELATIVE PERCENT: 3.4 % (ref 0–4)
PCT TRANSFERRIN: 15 % (ref 10–44)
PDW BLD-RTO: 15.9 % (ref 11.7–14.9)
PLATELET # BLD: 241 K/CU MM (ref 140–440)
PMV BLD AUTO: 9.9 FL (ref 7.5–11.1)
RBC # BLD: 4.88 M/CU MM (ref 4.2–5.4)
SEGMENTED NEUTROPHILS ABSOLUTE COUNT: 10.3 K/CU MM
SEGMENTED NEUTROPHILS RELATIVE PERCENT: 81.7 % (ref 36–66)
TOTAL IRON BINDING CAPACITY: 316 UG/DL (ref 250–450)
UNSATURATED IRON BINDING CAPACITY: 269 UG/DL (ref 110–370)
WBC # BLD: 12.6 K/CU MM (ref 4–10.5)

## 2022-05-13 PROCEDURE — 85025 COMPLETE CBC W/AUTO DIFF WBC: CPT

## 2022-05-13 PROCEDURE — 36415 COLL VENOUS BLD VENIPUNCTURE: CPT

## 2022-05-13 PROCEDURE — 83540 ASSAY OF IRON: CPT

## 2022-05-13 PROCEDURE — 82728 ASSAY OF FERRITIN: CPT

## 2022-05-13 PROCEDURE — 83615 LACTATE (LD) (LDH) ENZYME: CPT

## 2022-05-13 PROCEDURE — 83550 IRON BINDING TEST: CPT

## 2022-06-06 ENCOUNTER — OFFICE VISIT (OUTPATIENT)
Dept: ONCOLOGY | Age: 72
End: 2022-06-06
Payer: MEDICARE

## 2022-06-06 ENCOUNTER — HOSPITAL ENCOUNTER (EMERGENCY)
Age: 72
Discharge: HOME | End: 2022-06-06
Attending: EMERGENCY MEDICINE
Payer: MEDICARE

## 2022-06-06 ENCOUNTER — HOSPITAL ENCOUNTER (OUTPATIENT)
Dept: INFUSION THERAPY | Age: 72
Discharge: HOME | End: 2022-06-06

## 2022-06-06 VITALS
HEART RATE: 93 BPM | TEMPERATURE: 97.8 F | WEIGHT: 175 LBS | SYSTOLIC BLOOD PRESSURE: 157 MMHG | RESPIRATION RATE: 16 BRPM | DIASTOLIC BLOOD PRESSURE: 93 MMHG | HEIGHT: 63 IN | BODY MASS INDEX: 31.01 KG/M2 | OXYGEN SATURATION: 96 %

## 2022-06-06 VITALS
TEMPERATURE: 97.2 F | RESPIRATION RATE: 16 BRPM | OXYGEN SATURATION: 95 % | BODY MASS INDEX: 32.07 KG/M2 | WEIGHT: 181 LBS | HEIGHT: 63 IN | HEART RATE: 86 BPM | SYSTOLIC BLOOD PRESSURE: 141 MMHG | DIASTOLIC BLOOD PRESSURE: 77 MMHG

## 2022-06-06 DIAGNOSIS — D50.9 IRON DEFICIENCY ANEMIA, UNSPECIFIED IRON DEFICIENCY ANEMIA TYPE: ICD-10-CM

## 2022-06-06 DIAGNOSIS — D45 POLYCYTHEMIA VERA (HCC): Primary | ICD-10-CM

## 2022-06-06 DIAGNOSIS — C44.92 SQUAMOUS CELL CARCINOMA OF SKIN, UNSPECIFIED: ICD-10-CM

## 2022-06-06 DIAGNOSIS — L03.90 CELLULITIS, UNSPECIFIED CELLULITIS SITE: ICD-10-CM

## 2022-06-06 DIAGNOSIS — E53.8 B12 DEFICIENCY: ICD-10-CM

## 2022-06-06 DIAGNOSIS — L03.031 PARONYCHIA OF GREAT TOE, RIGHT: Primary | ICD-10-CM

## 2022-06-06 PROCEDURE — 3017F COLORECTAL CA SCREEN DOC REV: CPT | Performed by: INTERNAL MEDICINE

## 2022-06-06 PROCEDURE — 1123F ACP DISCUSS/DSCN MKR DOCD: CPT | Performed by: INTERNAL MEDICINE

## 2022-06-06 PROCEDURE — 99214 OFFICE O/P EST MOD 30 MIN: CPT | Performed by: INTERNAL MEDICINE

## 2022-06-06 PROCEDURE — 1090F PRES/ABSN URINE INCON ASSESS: CPT | Performed by: INTERNAL MEDICINE

## 2022-06-06 PROCEDURE — 1036F TOBACCO NON-USER: CPT | Performed by: INTERNAL MEDICINE

## 2022-06-06 PROCEDURE — G8400 PT W/DXA NO RESULTS DOC: HCPCS | Performed by: INTERNAL MEDICINE

## 2022-06-06 PROCEDURE — 6370000000 HC RX 637 (ALT 250 FOR IP): Performed by: EMERGENCY MEDICINE

## 2022-06-06 PROCEDURE — G8427 DOCREV CUR MEDS BY ELIG CLIN: HCPCS | Performed by: INTERNAL MEDICINE

## 2022-06-06 PROCEDURE — 10061 I&D ABSCESS COMP/MULTIPLE: CPT

## 2022-06-06 PROCEDURE — G8417 CALC BMI ABV UP PARAM F/U: HCPCS | Performed by: INTERNAL MEDICINE

## 2022-06-06 PROCEDURE — 99283 EMERGENCY DEPT VISIT LOW MDM: CPT

## 2022-06-06 RX ORDER — HYDROCODONE BITARTRATE AND ACETAMINOPHEN 5; 325 MG/1; MG/1
1 TABLET ORAL DAILY PRN
Qty: 7 TABLET | Refills: 0 | Status: SHIPPED | OUTPATIENT
Start: 2022-06-06 | End: 2022-06-13

## 2022-06-06 RX ORDER — AMOXICILLIN AND CLAVULANATE POTASSIUM 875; 125 MG/1; MG/1
1 TABLET, FILM COATED ORAL ONCE
Status: COMPLETED | OUTPATIENT
Start: 2022-06-06 | End: 2022-06-06

## 2022-06-06 RX ORDER — HYDROCODONE BITARTRATE AND ACETAMINOPHEN 5; 325 MG/1; MG/1
1 TABLET ORAL ONCE
Status: COMPLETED | OUTPATIENT
Start: 2022-06-06 | End: 2022-06-06

## 2022-06-06 RX ORDER — AMOXICILLIN AND CLAVULANATE POTASSIUM 875; 125 MG/1; MG/1
1 TABLET, FILM COATED ORAL 2 TIMES DAILY
Qty: 14 TABLET | Refills: 0 | Status: SHIPPED | OUTPATIENT
Start: 2022-06-06 | End: 2022-06-13

## 2022-06-06 RX ADMIN — HYDROCODONE BITARTRATE AND ACETAMINOPHEN 1 TABLET: 5; 325 TABLET ORAL at 02:00

## 2022-06-06 RX ADMIN — AMOXICILLIN AND CLAVULANATE POTASSIUM 1 TABLET: 875; 125 TABLET, FILM COATED ORAL at 02:00

## 2022-06-06 ASSESSMENT — PAIN - FUNCTIONAL ASSESSMENT: PAIN_FUNCTIONAL_ASSESSMENT: 0-10

## 2022-06-06 ASSESSMENT — PAIN DESCRIPTION - LOCATION: LOCATION: TOE (COMMENT WHICH ONE)

## 2022-06-06 ASSESSMENT — PAIN SCALES - GENERAL
PAINLEVEL_OUTOF10: 8
PAINLEVEL_OUTOF10: 8

## 2022-06-06 ASSESSMENT — PAIN DESCRIPTION - DESCRIPTORS: DESCRIPTORS: THROBBING

## 2022-06-06 ASSESSMENT — PAIN DESCRIPTION - ORIENTATION: ORIENTATION: RIGHT

## 2022-06-06 NOTE — ED PROVIDER NOTES
Triage Chief Complaint:   Wound Check (R gt toe )    [place]:  Kevin Mayers is a 67 y.o. female that presents for swelling, redness, pain and soreness of her right great toe over the last 2 days which has been worsening. States that she recently had a pedicure and symptoms started after that. Denies any fevers, nausea or vomiting. No other acute complaints. ROS:  At least 6 systems reviewed and otherwise acutely negative except as in the [address] Past Medical History:   Diagnosis Date    Cancer [facility])     Coronary artery disease involving native coronary artery of native heart with angina pectoris ([facility] [address] 10/22/2015    Depression     GERD (gastroesophageal reflux disease)     Hyperlipidemia     Insomnia     Polycythemia     Thyroid disease     TIA (transient ischemic attack)      Past Surgical History:   Procedure Laterality Date    BACK SURGERY      2 surgeries    CARDIAC SURGERY      quad    CHOLECYSTECTOMY      CORONARY ARTERY BYPASS GRAFT      HERNIA REPAIR      HYSTERECTOMY      KIDNEY STONE SURGERY      LAMINECTOMY      THYROID SURGERY       Family History   Problem Relation Age of Onset    Cancer Sister     Heart Disease Brother     Heart Disease Brother     Heart Disease Brother     Heart Disease Brother     Heart Disease Brother     Heart Disease Brother      Social History     Socioeconomic History    Marital status:       Spouse name: Not on file    Number of children: Not on file    Years of education: Not on file    Highest education level: Not on file   Occupational History    Occupation: retired   Tobacco Use    Smoking status: Never Smoker    Smokeless tobacco: Never Used   Vaping Use    Vaping Use: Never used   Substance and Sexual Activity    Alcohol use: No    Drug use: No    Sexual activity: Yes     Partners: Male   Other Topics Concern    Not on file   Social History Narrative    Not on file     Social Determinants of Health     Financial Resource Strain:     Difficulty of Paying Living Expenses: Not on file   Food Insecurity:     Worried About Running Out of Food in the Last Year: Not on file    Mark Anthony of Food in the Last Year: Not on file   Transportation Needs:     Lack of Transportation (Medical): Not on file    Lack of Transportation (Non-Medical):  Not on file   Physical Activity:     Days of Exercise per Week: Not on file    Minutes of Exercise per Session: Not on file   Stress:     Feeling of Stress : Not on file   Social Connections:     Frequency of Communication with Friends and Family: Not on file    Frequency of Social Gatherings with Friends and Family: Not on file    Attends Taoism Services: Not on file    Active Member of [address] [place] [address] [employer] or Organizations: Not on file    Attends Club or Organization Meetings: Not on file    Marital Status: Not on file   Intimate Partner Violence:     Fear of Current or Ex-Partner: Not on file    Emotionally Abused: Not on file    Physically Abused: Not on file    Sexually Abused: Not on file   Housing Stability:     Unable to Pay for Housing in the Last Year: Not on file    Number of [place] in the Last Year: Not on file    Unstable Housing in the Last Year: Not on file     Current Facility-Administered Medications   Medication Dose Route Frequency Provider Last Rate Last Admin    butamben-tetracaine-benzocaine (CETACAINE) spray 1 spray  1 spray Topical Once Frieda Lentz MD        amoxicillin-clavulanate (AUGMENTIN) 875-125 MG per tablet 1 tablet  1 tablet Oral Once Frieda Lentz MD        HYDROcodone-acetaminophen [facility]) 5-325 MG per tablet 1 tablet  1 tablet Oral Once Frieda Lentz MD         Current Outpatient Medications   Medication Sig Dispense Refill    amoxicillin-clavulanate (AUGMENTIN) 875-125 MG per tablet Take 1 tablet by mouth 2 times daily for 7 days 14 tablet 0    VITAMIN B1-B12 PO Take 1 tablet by mouth daily      ferrous gluconate 324 (37.5 Fe) MG TABS Take 1 tablet by mouth 2 times daily 180 tablet 5    XARELTO 20 MG TABS tablet Take 1 tablet by mouth daily      atorvastatin (LIPITOR) 40 MG tablet Take 40 mg by mouth daily      levothyroxine (SYNTHROID) 25 MCG tablet Take 25 mcg by mouth Daily      Esomeprazole Magnesium (NEXIUM PO) Take 1 tablet by mouth nightly      carvedilol (COREG) 6.25 MG tablet Take 1 tablet by mouth 2 times daily (Patient taking differently: Take 3.125 mg by mouth 2 times daily 1/2 tab) 60 tablet 3    gabapentin (NEURONTIN) 400 MG capsule Take 400 mg by mouth nightly       Allergies   Allergen Reactions    Sulfa Antibiotics Other (See Comments)     Was told not to take       Nursing Notes Reviewed    Physical Exam:  ED Triage Vitals [06/06/22 0145]   Enc Vitals Group      BP (!) 157/93      Heart Rate 93      Resp 16      Temp 97.8 °F (36.6 °C)      Temp Source Oral      SpO2 96 %      Weight 175 lb (79.4 kg)      Height 5' 3\" (1.6 m)      Head Circumference       Peak Flow       Pain Score       Pain Loc       Pain Edu? Excl. in [address]? GENERAL APPEARANCE: Awake and alert. Cooperative. No acute distress. EXTREMITIES: RLE: Tender fluctuance, erythema purpleish discoloration along the eponychial fold of the great toe more prominent medially. Nailbed is intact. SKIN: Warm and dry. I have reviewed and interpreted all of the currently available lab results from this visit (if applicable):  No results found for this visit on 06/06/22. Radiographs (if obtained):  [] The following radiograph was interpreted by myself in the absence of a radiologist:  [] Radiologist's Report Reviewed:    EKG (if obtained): (All EKG's are interpreted by myself in the absence of a cardiologist)    MDM:  Plan of care is discussed thoroughly with the patient and family if present. If performed, all imaging and lab work also discussed with patient. All relevant prior results and chart reviewed if available.             Patient presents with paronychia of right great toe. This is drained and patient tolerated well. She has some surrounding erythema and will be started on Augmentin. Discharged instructed to follow-up with PCP in 2 days. Procedure Note - Incision and Drainage:  Questions were sought and answered and verbal consent was given by patient for the procedure. The area was prepped and draped in a standard bedside fashion. An 11 blade surgical scalpel was used to make a stab incision over the area of maximum fluctulance. Reverse scissors were then used to break up loculations. The patient tolerated the procedure well without complications. Abscess and wound care education was provided. Instructions were given to return for increasing pain, redness or any other worsening or worrisome concerns. Clinical Impression:  1.  Paronychia of great toe, right      (Please note that portions of this note may have been completed with a voice recognition program. Efforts were made to edit the dictations but occasionally words are mis-transcribed.)    MD Jerad Aburto MD  06/06/22 1466

## 2022-06-06 NOTE — PROGRESS NOTES
MA Rooming Questions  Patient: Adrianne Lorenzana  MRN: [id number]    Date: 6/6/2022        1. Do you have any new issues?   no         2. Do you need any refills on medications?    no    3. Have you had any imaging done since your last visit?   no    4. Have you been hospitalized or seen in the emergency room since your last visit here?   no    5. Did the patient have a depression screening completed today?  No    No data recorded     PHQ-9 Given to (if applicable):               PHQ-9 Score (if applicable):                     [] Positive     []  Negative              Does question #9 need addressed (if applicable)                     [] Yes    []  No               Nicholas Lazcano MA

## 2022-06-06 NOTE — PROGRESS NOTES
Patient Name: Yuliana Gallegos  Patient : 1950  Patient MRN: [id number]     Primary Oncologist: Baudilio Muhammad MD  PCP: Dr Ismael Ardon        Date of Service: 2022      Chief Complaint:   Chief Complaint   Patient presents with    Follow-up        Active Problem list  1. Polycythemia vera ([address]    2. Squamous cell carcinoma of skin, unspecified    3. B12 deficiency    4. Iron deficiency anemia, unspecified iron deficiency anemia type    5. Cellulitis, unspecified cellulitis site           HPI:        Referred in 2016 for polycythemia, CBC with wbc 13.6, Hb 14.4 hct 47.8, plt 513K  16 Jak2 postive  Was started on Hydrea as h/o MI needing CABG and also h/o TIA x 2, dose adjusted as needed. Then with [facility], had EGD, Cscope and VCE in may 2016  Cscope with diverticulosis and nonbleeding internal hemorrhoids  EGD with erythematous stomach but biopsy neg for h.pylori or malignancy  Capsule endoscopy with apthous ulcers in the small bowel. 18: Hydrea 1000mg OD    2018: Ct abdomen and pelvis:Impression  No renal or ureteral stone. No bladder stone. Distal left ureter does not completely opacify but otherwise appears  unremarkable. Otherwise no filling defect within the renal or ureteral  collecting systems. Mild irregularity along the posterior bladder wall which may be related to  ureterovesical junction bilaterally. Recommend further evaluation with  cystoscopy given hematuria.       19: CBC with wbc 11, Hb 20.2, hct 60, mcv 105, plt 171K  Creatinine 1.06, alk [id number]  ldh 296    3/12/19:CBC with wbc 11.9, Hb 15.6, hct 49.2, mcv 106, plt 384K  Ferritin 9  sats 7    2020 LDH of 149 CMP with sodium 138 potassium 4.6 glucose of 37 creatinine 1 calcium 10.3 ALT 8 AST 10 alk phos 115 CBC with WBC 11.7 hemoglobin 13.1 hematocrit 47.7 MCV of 101.7 platelets of 818      2020 EGD with Possible barrettes but biopsy negative     Had phlebotomy  and 2021 ASA and hydrea were held sec to cytopenia    4/18/21: Presented to ER with left jaw swelling and pain,which started 4/17/21. No fever. No bleeding. . 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  Parotid gland   appears normal.     6/29/2021 no DVT in the right lower extremity    9/20/2021 CBC with hemoglobin of 16.2 hematocrit of 50.9 platelets of 641, ferritin of 53    9/27/21: left bunionectomy    10/7/21: TIA sx.    10/4/21: CXR/CT head/CTA neck/head negative    PMH:   Polycythemia vera  Hypothyroidism  CAD  Lower back pain  HTN  HLP    PSH:  left rotator cuff repair 2017  Laminectomies x 2  cholecystectomy  hernia repair  removal of renal ca;culi  Total thyroidectomy  CABG  hemorrhoidectomy x 2  Hysterectomy  Lumpectomy left breast, non malignant  Eye lid surgery    Allergies: None    SH: Grand son lives with her. 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 FH: Brother has leukemia ?type. No other blood dyscrasias    Interval History  6/6/2022:Arrived alone to the clinic today. Is on hydrea 1000mg daily. Is on oral iron bid. Reported mechanical fall as her dog pulled her while she walking them. Face and RUE has hematoma. Had pedicure and started noticing change in the color  of the right big toe, went to the ER and had I/D done. They gave her abx. No fever. They also asked her to soak in epsom salt. No chest pain. No increased sob, palpitations or any dizziness. No falls. Denied any  night sweats, lymphadenopathy. Denied any overt bleeding. Denied any abdominal pain, nausea, vomiting, diarrhea, constipation. Gained weight again.  Right ankle hurts      Review of Systems   Per interval history; otherwise 10 point ROS is negative              Vital Signs:  BP (!) 141/77 (Site: Right Upper Arm, Position: Sitting, Cuff Size: Large Adult)   Pulse 86   Temp 97.2 °F (36.2 °C) (Infrared)   Resp 16   Ht 5' 3\" (1.6 m)   Wt 181 lb (82.1 kg)   SpO2 95%   BMI 32.06 kg/m²     Physical Exam:    CONSTITUTIONAL: awake, alert, overweight  EYES:No palor or icterus   ENT:NCAT  NECK: No JVD   HEMATOLOGIC/LYMPHATIC: no cervical, supraclavicular or axillary lymphadenopathy   LUNGS: CTAB, no wheeze   CARDIOVASCULAR:Sternal scar healed well. s1s2 rrr SM  ABDOMEN: soft ntnd bs pos, no hsm  NEUROLOGIC: GI   SKIN: AKs , Scalp scar well-healed  EXTREMITIES:right big toe discoloration.  Tender       Labs:    Hematology:  Lab Results   Component Value Date    WBC 12.6 (H) 05/13/2022    RBC 4.88 05/13/2022    HGB 16.6 (H) 05/13/2022    HCT 53.9 (H) 05/13/2022    .5 (H) 05/13/2022    MCH 34.0 (H) 05/13/2022    MCHC 30.8 (L) 05/13/2022    RDW 15.9 (H) 05/13/2022     05/13/2022    MPV 9.9 05/13/2022    BANDSPCT 3 (L) 10/11/2019    SEGSPCT 81.7 (H) 05/13/2022    EOSRELPCT 2.1 05/13/2022    BASOPCT 0.6 05/13/2022    LYMPHOPCT 12.2 (L) 05/13/2022    MONOPCT 3.4 05/13/2022    BANDABS 0.38 10/11/2019    SEGSABS 10.3 05/13/2022    EOSABS 0.3 05/13/2022    BASOSABS 0.1 05/13/2022    LYMPHSABS 1.5 05/13/2022    MONOSABS 0.4 05/13/2022    DIFFTYPE AUTOMATED DIFFERENTIAL 05/13/2022    ANISOCYTOSIS 1+ 12/27/2019    POLYCHROM 1+ 12/27/2019    WBCMORP FEW  ATYPICAL LYMPH(S) NOTED   12/23/2019    PLTM RARE LARGE PLATELETS SEEN ON SMEAR 10/11/2019     No results found for: ESR    Chemistry:  Lab Results   Component Value Date     02/11/2022    K 3.9 02/11/2022     02/11/2022    CO2 26 02/11/2022    BUN 14 02/11/2022    CREATININE 1.0 02/11/2022    GLUCOSE 133 (H) 02/11/2022    CALCIUM 9.8 02/11/2022    PROT 6.2 (L) 02/11/2022    LABALBU 3.8 02/11/2022    BILITOT 0.6 02/11/2022    ALKPHOS 128 02/11/2022    AST 10 (L) 02/11/2022    ALT 10 02/11/2022    LABGLOM 55 (L) 02/11/2022    GFRAA >60 02/11/2022    MG 2.0 09/26/2015    POCCA 1.31 09/23/2015    POCGLU 108 (H) 10/04/2021     Lab Results   Component Value Date     05/13/2022     No components found for: LD  Lab Results   Component Value Date    TSHHS 2.720 05/06/2019    T4FREE 1.20 05/06/2019    FT3 2.8 11/08/2016       Immunology:  Lab Results   Component Value Date    PROT 6.2 (L) 02/11/2022     No results found for: Woodruff Ahr, KLFLCR  No results found for: B2M    Coagulation Panel:  Lab Results   Component Value Date    PROTIME 20.2 (H) 10/04/2021    INR 1.76 10/04/2021    APTT 31.3 09/17/2020       Anemia Panel:  Lab Results   Component Value Date    ZYVDSOYF30 >2000 (H) 01/12/2022    FOLATE >20.0 (H) 01/12/2022       Tumor Markers:  No results found for: , CEA, , LABCA2, PSA      Imaging: Reviewed     Pathology:Reviewed     Observations:  Performance Status: ECOG 0  Depression Status: No data recorded          Assessment & Plan:  PVera/Low ferritin: Low Erythropoetin and JAK2 + suggestive of polycythemia vera. Started Hydrea jan 2018, as H/O thrombosis(needing CABG) and also Possible TIA. leukocytosis in may 2019 was most probably sec to steroids, Flow at that point with left shift but otherwise normal.  CBC march 2021  with wbc 21.5, Hb 18.5, hct 61, mcv 78.4, platelet 327. Ferritin 27, sats 7% . Recommended Increasing hydrea to 1500mg OD, increase iron to PO bid and phlebotomy x 2. Continued ASA and adequate hydration. Note severe decline in wbc and platelet counts , consistent upon recheck CBC. Could be sec to increased hydrea dose, phlebotomy. Hydrea and ASA were held. No hemolysis. FLOW with no evidence of acute leukemia  Resumed Hydrea and low-dose aspirin once counts were normalized  Change to hydra 1000mg alternating with 1500 mgs for two days a weak  Phlebotomy to keep hct <46%, she wishes to skip this time as well    HTN: Recommend maintaining a log. Follow up with PCP. Compliant with medication. Salt restriction, exercise and weight loss    Low b12:  continue only oral supplementation. Left sided facial/jaw swelling: CT MF as above. Resolved. Iron indices and ferritin were suggestive of jeff. Recent GI eval normal in 2021, no overt bleeding. Note she required phlebotomy. Recommend continuation of oral iron twice daily, adequate bowel regimen. RLE cellulitis /foot edema: Recommend abx and prescribed short term norco.     TIA: Is on xarelto. LD ASA added back again. Phlebotomy to keep Hct <46%    Constipation: Recommend stool softener and laxatives as needed    Increased alk Po4: H/o cholecystectomy    Skin cancer, followed by surgery/dermatology    Lipoma on the neck: Is being followed     Easy bruising: PT/PTT normal    Mild hypercalcemia:Recommended holding off on calcium supplements , repeat ca feb 2022 normal    Screening: Is uptodate on mammogram(2021 in [place] was normal)and colonoscopies. Dexa scan 2019 normal. No further pap smears. Discussed the above findings and plan with the pt. She verbalizes understanding. Answered all questions. Discussed healthy lifestyle including exercise and weight loss. Recommend follow up with PCP and other specialists. Please do not hesitate to call if you need any further information. RTC July 2022  or earlier if new Sx. I have recommended that the patient follow CDC guidelines for prevention of COVID-19 infection.  Received COVID vaccine, booster, flu vaccine    KIM

## 2022-07-21 ENCOUNTER — OFFICE VISIT (OUTPATIENT)
Dept: ONCOLOGY | Age: 72
End: 2022-07-21
Payer: MEDICARE

## 2022-07-21 ENCOUNTER — HOSPITAL ENCOUNTER (OUTPATIENT)
Dept: INFUSION THERAPY | Age: 72
Discharge: HOME | End: 2022-07-21
Payer: MEDICARE

## 2022-07-21 ENCOUNTER — TELEPHONE (OUTPATIENT)
Dept: ONCOLOGY | Age: 72
End: 2022-07-21

## 2022-07-21 VITALS
DIASTOLIC BLOOD PRESSURE: 71 MMHG | TEMPERATURE: 97.2 F | OXYGEN SATURATION: 96 % | WEIGHT: 175.4 LBS | SYSTOLIC BLOOD PRESSURE: 131 MMHG | HEART RATE: 88 BPM | RESPIRATION RATE: 16 BRPM | BODY MASS INDEX: 31.08 KG/M2 | HEIGHT: 63 IN

## 2022-07-21 DIAGNOSIS — D68.9 COAGULOPATHY (HCC): ICD-10-CM

## 2022-07-21 DIAGNOSIS — D50.9 IRON DEFICIENCY ANEMIA, UNSPECIFIED IRON DEFICIENCY ANEMIA TYPE: ICD-10-CM

## 2022-07-21 DIAGNOSIS — K90.9 INTESTINAL MALABSORPTION, UNSPECIFIED TYPE: ICD-10-CM

## 2022-07-21 DIAGNOSIS — D45 POLYCYTHEMIA VERA (HCC): Primary | ICD-10-CM

## 2022-07-21 DIAGNOSIS — C44.92 SQUAMOUS CELL CARCINOMA OF SKIN, UNSPECIFIED: ICD-10-CM

## 2022-07-21 DIAGNOSIS — E53.8 B12 DEFICIENCY: ICD-10-CM

## 2022-07-21 DIAGNOSIS — D45 POLYCYTHEMIA VERA (HCC): ICD-10-CM

## 2022-07-21 LAB
ALBUMIN SERPL-MCNC: 4.4 GM/DL (ref 3.4–5)
ALP BLD-CCNC: 201 IU/L (ref 40–128)
ALT SERPL-CCNC: 15 U/L (ref 10–40)
ANION GAP SERPL CALCULATED.3IONS-SCNC: 13 MMOL/L (ref 4–16)
AST SERPL-CCNC: 14 IU/L (ref 15–37)
BASOPHILS ABSOLUTE: 0.1 K/CU MM
BASOPHILS RELATIVE PERCENT: 0.6 % (ref 0–1)
BILIRUB SERPL-MCNC: 0.5 MG/DL (ref 0–1)
BUN BLDV-MCNC: 13 MG/DL (ref 6–23)
CALCIUM SERPL-MCNC: 10.5 MG/DL (ref 8.3–10.6)
CHLORIDE BLD-SCNC: 103 MMOL/L (ref 99–110)
CO2: 24 MMOL/L (ref 21–32)
CREAT SERPL-MCNC: 1 MG/DL (ref 0.6–1.1)
DIFFERENTIAL TYPE: ABNORMAL
EOSINOPHILS ABSOLUTE: 0.2 K/CU MM
EOSINOPHILS RELATIVE PERCENT: 1.3 % (ref 0–3)
GFR AFRICAN AMERICAN: >60 ML/MIN/1.73M2
GFR NON-AFRICAN AMERICAN: 54 ML/MIN/1.73M2
GLUCOSE BLD-MCNC: 97 MG/DL (ref 70–99)
HCT VFR BLD CALC: 54.5 % (ref 37–47)
HEMOGLOBIN: 17.1 GM/DL (ref 12.5–16)
LYMPHOCYTES ABSOLUTE: 1.6 K/CU MM
LYMPHOCYTES RELATIVE PERCENT: 12.4 % (ref 24–44)
MCH RBC QN AUTO: 33.7 PG (ref 27–31)
MCHC RBC AUTO-ENTMCNC: 31.4 % (ref 32–36)
MCV RBC AUTO: 107.3 FL (ref 78–100)
MONOCYTES ABSOLUTE: 0.4 K/CU MM
MONOCYTES RELATIVE PERCENT: 3 % (ref 0–4)
PDW BLD-RTO: 16.1 % (ref 11.7–14.9)
PLATELET # BLD: 192 K/CU MM (ref 140–440)
PMV BLD AUTO: 10.5 FL (ref 7.5–11.1)
POTASSIUM SERPL-SCNC: 4.3 MMOL/L (ref 3.5–5.1)
RBC # BLD: 5.08 M/CU MM (ref 4.2–5.4)
SEGMENTED NEUTROPHILS ABSOLUTE COUNT: 10.4 K/CU MM
SEGMENTED NEUTROPHILS RELATIVE PERCENT: 82.7 % (ref 36–66)
SODIUM BLD-SCNC: 140 MMOL/L (ref 135–145)
TOTAL PROTEIN: 6.7 GM/DL (ref 6.4–8.2)
WBC # BLD: 12.6 K/CU MM (ref 4–10.5)

## 2022-07-21 PROCEDURE — 80053 COMPREHEN METABOLIC PANEL: CPT

## 2022-07-21 PROCEDURE — 99214 OFFICE O/P EST MOD 30 MIN: CPT | Performed by: INTERNAL MEDICINE

## 2022-07-21 PROCEDURE — 82746 ASSAY OF FOLIC ACID SERUM: CPT

## 2022-07-21 PROCEDURE — 83550 IRON BINDING TEST: CPT

## 2022-07-21 PROCEDURE — 36415 COLL VENOUS BLD VENIPUNCTURE: CPT

## 2022-07-21 PROCEDURE — 83540 ASSAY OF IRON: CPT

## 2022-07-21 PROCEDURE — 83615 LACTATE (LD) (LDH) ENZYME: CPT

## 2022-07-21 PROCEDURE — 82728 ASSAY OF FERRITIN: CPT

## 2022-07-21 PROCEDURE — 85025 COMPLETE CBC W/AUTO DIFF WBC: CPT

## 2022-07-21 PROCEDURE — 82607 VITAMIN B-12: CPT

## 2022-07-21 PROCEDURE — 1123F ACP DISCUSS/DSCN MKR DOCD: CPT | Performed by: INTERNAL MEDICINE

## 2022-07-21 RX ORDER — AMLODIPINE BESYLATE 5 MG/1
TABLET ORAL
COMMUNITY

## 2022-07-21 RX ORDER — QUETIAPINE FUMARATE 25 MG/1
TABLET, FILM COATED ORAL
COMMUNITY
Start: 2022-07-18

## 2022-07-21 NOTE — PROGRESS NOTES
Labs drawn today 07/21/22. New order for therapeutic phlebotomy x1. Therapy plan added: Waste 500 mL blood x1 per physician order.

## 2022-07-21 NOTE — PROGRESS NOTES
Patient Name: Gordon Herring  Patient : 1950  Patient MRN: [id number]     Primary Oncologist: Андрей Vazquez MD  PCP: Dr Jerad Conteh        Date of Service: 2022      Chief Complaint:   Chief Complaint   Patient presents with    Follow-up        Active Problem list  1. Polycythemia vera ([facility] [address]    2. Squamous cell carcinoma of skin, unspecified    3. B12 deficiency    4. Iron deficiency anemia, unspecified iron deficiency anemia type    5. Intestinal malabsorption, unspecified type    6. Coagulopathy ([facility] [address]           HPI:        Referred in 2016 for polycythemia, CBC with wbc 13.6, Hb 14.4 hct 47.8, plt 513K  16 Jak2 postive  Was started on Hydrea as h/o MI needing CABG and also h/o TIA x 2, dose adjusted as needed. Then with SHAILA, had EGD, Cscope and VCE in may 2016  Cscope with diverticulosis and nonbleeding internal hemorrhoids  EGD with erythematous stomach but biopsy neg for h.pylori or malignancy  Capsule endoscopy with apthous ulcers in the small bowel. 18: Hydrea 1000mg OD    2018: Ct abdomen and pelvis:Impression  No renal or ureteral stone. No bladder stone. Distal left ureter does not completely opacify but otherwise appears  unremarkable. Otherwise no filling defect within the renal or ureteral  collecting systems. Mild irregularity along the posterior bladder wall which may be related to  ureterovesical junction bilaterally. Recommend further evaluation with  cystoscopy given hematuria.       19: CBC with wbc 11, Hb 20.2, hct 60, mcv 105, plt 171K  Creatinine 1.06, alk [id number]  ldh 296    3/12/19:CBC with wbc 11.9, Hb 15.6, hct 49.2, mcv 106, plt 384K  Ferritin 9  sats 7    2020 LDH of 149 CMP with sodium 138 potassium 4.6 glucose of 37 creatinine 1 calcium 10.3 ALT 8 AST 10 alk phos 115 CBC with WBC 11.7 hemoglobin 13.1 hematocrit 47.7 MCV of 101.7 platelets of 821      2020 EGD with Possible barrettes but biopsy negative     Had phlebotomy  and 2021 April ASA and hydrea were held sec to cytopenia    4/18/21: Presented to ER with left jaw swelling and pain,which started 4/17/21. No fever. No bleeding. . 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 Parotid gland   appears normal.     6/29/2021 no DVT in the right lower extremity    9/20/2021 CBC with hemoglobin of 16.2 hematocrit of 50.9 platelets of 784, ferritin of 53    9/27/21: left bunionectomy    10/7/21: TIA sx.    10/4/21: CXR/CT head/CTA neck/head negative    PMH:   Polycythemia vera  Hypothyroidism  CAD  Lower back pain  HTN  HLP    PSH:  left rotator cuff repair 2017  Laminectomies x 2  cholecystectomy  hernia repair  removal of renal ca;culi  Total thyroidectomy  CABG  hemorrhoidectomy x 2  Hysterectomy  Lumpectomy left breast, non malignant  Eye lid surgery    Allergies: None    SH: Grand son lives with her. 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 FH: Brother has leukemia ?type. No other blood dyscrasias    Interval History  7/21/2022:Arrived alone to the clinic today. Is on hydrea 1000mg m/w/f/s/s and 1500mg on tuesdays and Thursday. Is on oral iron bid. Feels tired. Temperature still in the normal range but up for her. No night sweats, lad. Gaining weight. No new skin rash. No chest pain. No increased sob, palpitations or any dizziness. No falls. Denied any overt bleeding. Denied any abdominal pain, nausea, vomiting, diarrhea. Intermittent constipation.       Review of Systems   Per interval history; otherwise 10 point ROS is negative              Vital Signs:  /71 (Site: Left Upper Arm, Position: Sitting, Cuff Size: Large Adult)   Pulse 88   Temp 97.2 °F (36.2 °C) (Temporal)   Resp 16   Ht 5' 3\" (1.6 m)   Wt 175 lb 6.4 oz (79.6 kg)   SpO2 96%   BMI 31.07 kg/m²     Physical Exam:    CONSTITUTIONAL: awake, alert, overweight  EYES:No palor or icterus   ENT:NCAT  NECK: No JVD   HEMATOLOGIC/LYMPHATIC: no cervical, supraclavicular or axillary lymphadenopathy   LUNGS: CTAB  CARDIOVASCULAR:Sternal scar healed well. s1s2 rrr SM  ABDOMEN: soft ntnd bs pos, no hsm  NEUROLOGIC: GI   SKIN: AKs , Scalp scar well-healed  EXTREMITIES:very mild LE edema bilaterally      Labs:    Hematology:  Lab Results   Component Value Date    WBC 12.6 (H) 05/13/2022    RBC 4.88 05/13/2022    HGB 16.6 (H) 05/13/2022    HCT 53.9 (H) 05/13/2022    .5 (H) 05/13/2022    MCH 34.0 (H) 05/13/2022    MCHC 30.8 (L) 05/13/2022    RDW 15.9 (H) 05/13/2022     05/13/2022    MPV 9.9 05/13/2022    BANDSPCT 3 (L) 10/11/2019    SEGSPCT 81.7 (H) 05/13/2022    EOSRELPCT 2.1 05/13/2022    BASOPCT 0.6 05/13/2022    LYMPHOPCT 12.2 (L) 05/13/2022    MONOPCT 3.4 05/13/2022    BANDABS 0.38 10/11/2019    SEGSABS 10.3 05/13/2022    EOSABS 0.3 05/13/2022    BASOSABS 0.1 05/13/2022    LYMPHSABS 1.5 05/13/2022    MONOSABS 0.4 05/13/2022    DIFFTYPE AUTOMATED DIFFERENTIAL 05/13/2022    ANISOCYTOSIS 1+ 12/27/2019    POLYCHROM 1+ 12/27/2019    WBCMORP FEW  ATYPICAL LYMPH(S) NOTED   12/23/2019    PLTM RARE LARGE PLATELETS SEEN ON SMEAR 10/11/2019     No results found for: ESR    Chemistry:  Lab Results   Component Value Date     02/11/2022    K 3.9 02/11/2022     02/11/2022    CO2 26 02/11/2022    BUN 14 02/11/2022    CREATININE 1.0 02/11/2022    GLUCOSE 133 (H) 02/11/2022    CALCIUM 9.8 02/11/2022    PROT 6.2 (L) 02/11/2022    LABALBU 3.8 02/11/2022    BILITOT 0.6 02/11/2022    ALKPHOS 128 02/11/2022    AST 10 (L) 02/11/2022    ALT 10 02/11/2022    LABGLOM 55 (L) 02/11/2022    GFRAA >60 02/11/2022    MG 2.0 09/26/2015    POCCA 1.31 09/23/2015    POCGLU 108 (H) 10/04/2021     Lab Results   Component Value Date     05/13/2022     No components found for: LD  Lab Results   Component Value Date    TSHHS 2.720 05/06/2019    T4FREE 1.20 05/06/2019    FT3 2.8 11/08/2016 Immunology:  Lab Results   Component Value Date    PROT 6.2 (L) 02/11/2022     No results found for: Clayburn Ponto, KLFLCR  No results found for: B2M    Coagulation Panel:  Lab Results   Component Value Date    PROTIME 20.2 (H) 10/04/2021    INR 1.76 10/04/2021    APTT 31.3 09/17/2020       Anemia Panel:  Lab Results   Component Value Date    JHEUJSFY44 >2000 (H) 01/12/2022    FOLATE >20.0 (H) 01/12/2022       Tumor Markers:  No results found for: , CEA, , LABCA2, PSA      Imaging: Reviewed     Pathology:Reviewed     Observations:  Performance Status: ECOG 0  Depression Status: No data recorded          Assessment & Plan:  PVera/Low ferritin: Low Erythropoetin and JAK2 + suggestive of polycythemia vera. Started Hydrea jan 2018, as H/O thrombosis(needing CABG) and also Possible TIA. leukocytosis in may 2019 was most probably sec to steroids, Flow at that point with left shift but otherwise normal.  Hydrea being adjusted accordingly, CBC pending July 2022  Continue LD ASA  Phlebotomy to keep hct <46%, she wishes to skip this time as well    HTN: Better controlled. Follow up with PCP. Compliant with medication. Salt restriction, exercise and weight loss    Low b12: As needed  oral supplementation, b12 July 2022 pending    Iron indices and ferritin were suggestive of jeff. GI eval normal in 2021, no overt bleeding. Note she required phlebotomies. Recommend continuation of oral iron twice daily, adequate bowel regimen. CBC and ferritin pending July 2022     TIA: Is on xarelto. LD ASA added back again. Phlebotomy to keep Hct <46%    Constipation: Recommend stool softener and laxatives as needed    Increased alk Po4: H/o cholecystectomy    Skin cancer, followed by surgery/dermatology    Lipoma on the neck: Is being followed     Easy bruising: PT/PTT normal    Screening: Is uptodate on mammogram(2021 in [place] was normal)and colonoscopies. Dexa scan 2019 normal. No further pap smears.     Discussed the above findings and plan with the pt. She verbalizes understanding. Answered all questions. Discussed healthy lifestyle including exercise and weight loss. Recommend follow up with PCP and other specialists. Please do not hesitate to call if you need any further information.     RTC  Sep 2022  or earlier if new Taylor Parada

## 2022-07-22 ENCOUNTER — CLINICAL DOCUMENTATION (OUTPATIENT)
Dept: ONCOLOGY | Age: 72
End: 2022-07-22

## 2022-07-22 LAB
FERRITIN: 32 NG/ML (ref 15–150)
FOLATE: 3.9 NG/ML (ref 3.1–17.5)
IRON: 72 UG/DL (ref 37–145)
LACTATE DEHYDROGENASE: 164 IU/L (ref 120–246)
PCT TRANSFERRIN: 25 % (ref 10–44)
TOTAL IRON BINDING CAPACITY: 288 UG/DL (ref 250–450)
UNSATURATED IRON BINDING CAPACITY: 216 UG/DL (ref 110–370)
VITAMIN B-12: 1290 PG/ML (ref 211–911)

## 2022-07-22 NOTE — PROGRESS NOTES
Deisy Cruz Results:    Current dose takes 1000mg x 5days a week and 1500mg x 2 days aweek  New dose 1000mg daily    Lab Results   Component Value Date    WBC 12.6 (H) 07/21/2022    WBC 12.6 (H) 05/13/2022    HGB 17.1 (H) 07/21/2022    HGB 16.6 (H) 05/13/2022     07/21/2022     05/13/2022            Thank you

## 2022-07-25 ENCOUNTER — CLINICAL DOCUMENTATION (OUTPATIENT)
Dept: ONCOLOGY | Age: 72
End: 2022-07-25

## 2022-07-28 ENCOUNTER — HOSPITAL ENCOUNTER (OUTPATIENT)
Dept: INFUSION THERAPY | Age: 72
Setting detail: INFUSION SERIES
Discharge: HOME | End: 2022-07-28
Payer: MEDICARE

## 2022-07-28 VITALS
HEART RATE: 92 BPM | DIASTOLIC BLOOD PRESSURE: 84 MMHG | TEMPERATURE: 97.3 F | RESPIRATION RATE: 16 BRPM | OXYGEN SATURATION: 94 % | SYSTOLIC BLOOD PRESSURE: 126 MMHG

## 2022-07-28 DIAGNOSIS — D45 POLYCYTHEMIA VERA (HCC): Primary | ICD-10-CM

## 2022-07-28 PROCEDURE — 99195 PHLEBOTOMY: CPT

## 2022-07-28 PROCEDURE — 99211 OFF/OP EST MAY X REQ PHY/QHP: CPT

## 2022-07-28 NOTE — PROGRESS NOTES
Diagnosis: Polycythemia    Pre-Phlebotomy: BP /83   Pulse 84   Temp 97.3 °F (36.3 °C) (Infrared)   Resp 16   SpO2 95%     Post-Phlebotomy: /84, HR 88    Volume Removed: 556 grams per [facility] RN    Complications: None    Comments:  Tolerated well        Kaitlynn Frederick RN

## 2022-07-28 NOTE — PROGRESS NOTES
Tolerated infusion well. Reviewed discharge instruction, voiced understanding. Copies of AVS given. Pt discharged home. Pt to exit via ambulation. No orders of the defined types were placed in this encounter.

## 2022-09-21 ENCOUNTER — HOSPITAL ENCOUNTER (OUTPATIENT)
Dept: INFUSION THERAPY | Age: 72
Discharge: HOME | End: 2022-09-21
Payer: MEDICARE

## 2022-09-21 DIAGNOSIS — D50.9 IRON DEFICIENCY ANEMIA, UNSPECIFIED IRON DEFICIENCY ANEMIA TYPE: ICD-10-CM

## 2022-09-21 DIAGNOSIS — C44.92 SQUAMOUS CELL CARCINOMA OF SKIN, UNSPECIFIED: ICD-10-CM

## 2022-09-21 DIAGNOSIS — E53.8 B12 DEFICIENCY: ICD-10-CM

## 2022-09-21 DIAGNOSIS — L03.90 CELLULITIS, UNSPECIFIED CELLULITIS SITE: ICD-10-CM

## 2022-09-21 DIAGNOSIS — D45 POLYCYTHEMIA VERA (HCC): ICD-10-CM

## 2022-09-21 DIAGNOSIS — K90.9 INTESTINAL MALABSORPTION, UNSPECIFIED TYPE: ICD-10-CM

## 2022-09-21 DIAGNOSIS — D68.9 COAGULOPATHY (HCC): ICD-10-CM

## 2022-09-21 LAB
ALBUMIN SERPL-MCNC: 5 GM/DL (ref 3.4–5)
ALP BLD-CCNC: 170 IU/L (ref 40–129)
ALT SERPL-CCNC: 16 U/L (ref 10–40)
ANION GAP SERPL CALCULATED.3IONS-SCNC: 14 MMOL/L (ref 4–16)
AST SERPL-CCNC: 19 IU/L (ref 15–37)
BASOPHILS ABSOLUTE: 0.1 K/CU MM
BASOPHILS RELATIVE PERCENT: 0.7 % (ref 0–1)
BILIRUB SERPL-MCNC: 0.7 MG/DL (ref 0–1)
BUN BLDV-MCNC: 9 MG/DL (ref 6–23)
CALCIUM SERPL-MCNC: 11.1 MG/DL (ref 8.3–10.6)
CHLORIDE BLD-SCNC: 99 MMOL/L (ref 99–110)
CO2: 25 MMOL/L (ref 21–32)
CREAT SERPL-MCNC: 1 MG/DL (ref 0.6–1.1)
DIFFERENTIAL TYPE: ABNORMAL
EOSINOPHILS ABSOLUTE: 0.3 K/CU MM
EOSINOPHILS RELATIVE PERCENT: 2.9 % (ref 0–3)
FERRITIN: 80 NG/ML (ref 15–150)
FOLATE: 5.9 NG/ML (ref 3.1–17.5)
GFR AFRICAN AMERICAN: >60 ML/MIN/1.73M2
GFR NON-AFRICAN AMERICAN: 54 ML/MIN/1.73M2
GLUCOSE BLD-MCNC: 109 MG/DL (ref 70–99)
HCT VFR BLD CALC: 56.6 % (ref 37–47)
HEMOGLOBIN: 18.8 GM/DL (ref 12.5–16)
IRON: 107 UG/DL (ref 37–145)
LACTATE DEHYDROGENASE: 242 IU/L (ref 120–246)
LYMPHOCYTES ABSOLUTE: 1.8 K/CU MM
LYMPHOCYTES RELATIVE PERCENT: 16.6 % (ref 24–44)
MCH RBC QN AUTO: 35.3 PG (ref 27–31)
MCHC RBC AUTO-ENTMCNC: 33.2 % (ref 32–36)
MCV RBC AUTO: 106.4 FL (ref 78–100)
MONOCYTES ABSOLUTE: 0.5 K/CU MM
MONOCYTES RELATIVE PERCENT: 4.2 % (ref 0–4)
PCT TRANSFERRIN: 36 % (ref 10–44)
PDW BLD-RTO: 18.1 % (ref 11.7–14.9)
PLATELET # BLD: 229 K/CU MM (ref 140–440)
PMV BLD AUTO: 10.2 FL (ref 7.5–11.1)
POTASSIUM SERPL-SCNC: 4.2 MMOL/L (ref 3.5–5.1)
RBC # BLD: 5.32 M/CU MM (ref 4.2–5.4)
SEGMENTED NEUTROPHILS ABSOLUTE COUNT: 8.1 K/CU MM
SEGMENTED NEUTROPHILS RELATIVE PERCENT: 75.6 % (ref 36–66)
SODIUM BLD-SCNC: 138 MMOL/L (ref 135–145)
TOTAL IRON BINDING CAPACITY: 295 UG/DL (ref 250–450)
TOTAL PROTEIN: 7.4 GM/DL (ref 6.4–8.2)
UNSATURATED IRON BINDING CAPACITY: 188 UG/DL (ref 110–370)
VITAMIN B-12: >2000 PG/ML (ref 211–911)
WBC # BLD: 10.7 K/CU MM (ref 4–10.5)

## 2022-09-21 PROCEDURE — 82746 ASSAY OF FOLIC ACID SERUM: CPT

## 2022-09-21 PROCEDURE — 82728 ASSAY OF FERRITIN: CPT

## 2022-09-21 PROCEDURE — 83615 LACTATE (LD) (LDH) ENZYME: CPT

## 2022-09-21 PROCEDURE — 80053 COMPREHEN METABOLIC PANEL: CPT

## 2022-09-21 PROCEDURE — 83540 ASSAY OF IRON: CPT

## 2022-09-21 PROCEDURE — 85025 COMPLETE CBC W/AUTO DIFF WBC: CPT

## 2022-09-21 PROCEDURE — 82607 VITAMIN B-12: CPT

## 2022-09-21 PROCEDURE — 36415 COLL VENOUS BLD VENIPUNCTURE: CPT

## 2022-09-21 PROCEDURE — 83550 IRON BINDING TEST: CPT

## 2022-09-29 ENCOUNTER — OFFICE VISIT (OUTPATIENT)
Dept: ONCOLOGY | Age: 72
End: 2022-09-29
Payer: MEDICARE

## 2022-09-29 ENCOUNTER — HOSPITAL ENCOUNTER (OUTPATIENT)
Dept: INFUSION THERAPY | Age: 72
Discharge: HOME | End: 2022-09-29
Payer: MEDICARE

## 2022-09-29 VITALS
HEART RATE: 74 BPM | RESPIRATION RATE: 16 BRPM | WEIGHT: 180.2 LBS | OXYGEN SATURATION: 96 % | SYSTOLIC BLOOD PRESSURE: 150 MMHG | TEMPERATURE: 97.7 F | DIASTOLIC BLOOD PRESSURE: 82 MMHG | BODY MASS INDEX: 31.93 KG/M2 | HEIGHT: 63 IN

## 2022-09-29 DIAGNOSIS — D50.9 IRON DEFICIENCY ANEMIA, UNSPECIFIED IRON DEFICIENCY ANEMIA TYPE: ICD-10-CM

## 2022-09-29 DIAGNOSIS — K90.9 INTESTINAL MALABSORPTION, UNSPECIFIED TYPE: ICD-10-CM

## 2022-09-29 DIAGNOSIS — D45 POLYCYTHEMIA VERA (HCC): Primary | ICD-10-CM

## 2022-09-29 DIAGNOSIS — D68.9 COAGULOPATHY (HCC): ICD-10-CM

## 2022-09-29 DIAGNOSIS — C44.92 SQUAMOUS CELL CARCINOMA OF SKIN, UNSPECIFIED: ICD-10-CM

## 2022-09-29 DIAGNOSIS — E53.8 B12 DEFICIENCY: ICD-10-CM

## 2022-09-29 PROCEDURE — 99211 OFF/OP EST MAY X REQ PHY/QHP: CPT

## 2022-09-29 PROCEDURE — G8400 PT W/DXA NO RESULTS DOC: HCPCS | Performed by: INTERNAL MEDICINE

## 2022-09-29 PROCEDURE — 3017F COLORECTAL CA SCREEN DOC REV: CPT | Performed by: INTERNAL MEDICINE

## 2022-09-29 PROCEDURE — 1090F PRES/ABSN URINE INCON ASSESS: CPT | Performed by: INTERNAL MEDICINE

## 2022-09-29 PROCEDURE — 1123F ACP DISCUSS/DSCN MKR DOCD: CPT | Performed by: INTERNAL MEDICINE

## 2022-09-29 PROCEDURE — G8417 CALC BMI ABV UP PARAM F/U: HCPCS | Performed by: INTERNAL MEDICINE

## 2022-09-29 PROCEDURE — 99214 OFFICE O/P EST MOD 30 MIN: CPT | Performed by: INTERNAL MEDICINE

## 2022-09-29 PROCEDURE — G8427 DOCREV CUR MEDS BY ELIG CLIN: HCPCS | Performed by: INTERNAL MEDICINE

## 2022-09-29 PROCEDURE — 1036F TOBACCO NON-USER: CPT | Performed by: INTERNAL MEDICINE

## 2022-09-29 RX ORDER — 0.9 % SODIUM CHLORIDE 0.9 %
250 INTRAVENOUS SOLUTION INTRAVENOUS ONCE
Status: CANCELLED | OUTPATIENT
Start: 2022-09-29 | End: 2022-09-29

## 2022-09-29 ASSESSMENT — PATIENT HEALTH QUESTIONNAIRE - PHQ9
SUM OF ALL RESPONSES TO PHQ QUESTIONS 1-9: 0
SUM OF ALL RESPONSES TO PHQ9 QUESTIONS 1 & 2: 0
2. FEELING DOWN, DEPRESSED OR HOPELESS: 0
SUM OF ALL RESPONSES TO PHQ QUESTIONS 1-9: 0
1. LITTLE INTEREST OR PLEASURE IN DOING THINGS: 0
SUM OF ALL RESPONSES TO PHQ QUESTIONS 1-9: 0
SUM OF ALL RESPONSES TO PHQ QUESTIONS 1-9: 0

## 2022-09-29 NOTE — PROGRESS NOTES
Order placed for therapeutic phlebotomy: Withdraw 500 mL blood x1 per physician order. Therapy plan added.

## 2022-09-29 NOTE — PROGRESS NOTES
MA Rooming Questions  Patient: Rafa Wilson  MRN: [id number]    Date: 9/29/2022        1. Do you have any new issues?   no         2. Do you need any refills on medications?    no    3. Have you had any imaging done since your last visit?   no    4. Have you been hospitalized or seen in the emergency room since your last visit here?   no    5. Did the patient have a depression screening completed today?  Yes    PHQ-9 Total Score: 0 (9/29/2022 10:46 AM)       PHQ-9 Given to (if applicable):               PHQ-9 Score (if applicable):                     [] Positive     []  Negative              Does question #9 need addressed (if applicable)                     [] Yes    []  No               Nataliya Bhagat CMA

## 2022-09-29 NOTE — PROGRESS NOTES
Patient Name: Armando Mcgee  Patient : 1950  Patient MRN: [id number]     Primary Oncologist: Venu Hernandez MD  PCP: Dr Kristin Johnson        Date of Service: 2022      Chief Complaint:   Chief Complaint   Patient presents with    Discuss Labs        Active Problem list  1. Polycythemia vera ([facility] [address]    2. Squamous cell carcinoma of skin, unspecified    3. B12 deficiency    4. Iron deficiency anemia, unspecified iron deficiency anemia type    5. Intestinal malabsorption, unspecified type    6. Coagulopathy ([facility] [address]           HPI:        Referred in 2016 for polycythemia, CBC with wbc 13.6, Hb 14.4 hct 47.8, plt 513K  16 Jak2 postive  Was started on Hydrea as h/o MI needing CABG and also h/o TIA x 2, dose adjusted as needed. Then with SHAILA, had EGD, Cscope and VCE in may 2016  Cscope with diverticulosis and nonbleeding internal hemorrhoids  EGD with erythematous stomach but biopsy neg for h.pylori or malignancy  Capsule endoscopy with apthous ulcers in the small bowel. 18: Hydrea 1000mg OD    2018: Ct abdomen and pelvis:Impression  No renal or ureteral stone. No bladder stone. Distal left ureter does not completely opacify but otherwise appears  unremarkable. Otherwise no filling defect within the renal or ureteral  collecting systems. Mild irregularity along the posterior bladder wall which may be related to  ureterovesical junction bilaterally. Recommend further evaluation with  cystoscopy given hematuria.       19: CBC with wbc 11, Hb 20.2, hct 60, mcv 105, plt 171K  Creatinine 1.06, alk [id number]  ldh 296    3/12/19:CBC with wbc 11.9, Hb 15.6, hct 49.2, mcv 106, plt 384K  Ferritin 9  sats 7    2020 LDH of 149 CMP with sodium 138 potassium 4.6 glucose of 37 creatinine 1 calcium 10.3 ALT 8 AST 10 alk phos 115 CBC with WBC 11.7 hemoglobin 13.1 hematocrit 47.7 MCV of 101.7 platelets of 551      2020 EGD with Possible barrettes but biopsy negative     Had phlebotomy march 5 and 19 2021 April ASA and hydrea were held sec to cytopenia    4/18/21: Presented to ER with left jaw swelling and pain,which started 4/17/21. No fever. No bleeding. . 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 Parotid gland   appears normal.     6/29/2021 no DVT in the right lower extremity    9/20/2021 CBC with hemoglobin of 16.2 hematocrit of 50.9 platelets of 773, ferritin of 53    9/27/21: left bunionectomy    10/7/21: TIA sx.    10/4/21: CXR/CT head/CTA neck/head negative    PMH:   Polycythemia vera  Hypothyroidism  CAD  Lower back pain  HTN  HLP    PSH:  left rotator cuff repair 2017  Laminectomies x 2  cholecystectomy  hernia repair  removal of renal ca;culi  Total thyroidectomy  CABG  hemorrhoidectomy x 2  Hysterectomy  Lumpectomy left breast, non malignant  Eye lid surgery    Allergies: None    SH: Grand son lives with her. 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 FH: Brother has leukemia ?type. No other blood dyscrasias    Interval History  9/29/2022:Arrived alone to the clinic today. Is on hydrea 1000mg daily. Is on oral iron bid. No fever. No night sweats, lad. Feels tired all the time. Cannot sleep at night. Lower back pain often on for the past year but worsening lately. Pain so bad that its hard to catch the breath. Gaining more weight. No chest pain. Reported rash on the face and also back. Denied any overt bleeding. Intermittent abdominal spasm.  Diarrhea and urge lately      Review of Systems   Per interval history; otherwise 10 point ROS is negative              Vital Signs:  BP (!) 150/82 (Site: Right Upper Arm, Position: Sitting, Cuff Size: Large Adult)   Pulse 74   Temp 97.7 °F (36.5 °C) (Infrared)   Resp 16   Ht 5' 3\" (1.6 m)   Wt 180 lb 3.2 oz (81.7 kg)   SpO2 96%   BMI 31.92 kg/m²     Physical Exam:    CONSTITUTIONAL: awake, alert, overweight  EYES:No palor or icterus   ENT:NCAT  NECK: No JVD   HEMATOLOGIC/LYMPHATIC: no cervical, supraclavicular or axillary lymphadenopathy   LUNGS: CTAB  CARDIOVASCULAR:Sternal scar healed well. s1s2 rrr SM  ABDOMEN: soft ntnd bs pos, no hsm  NEUROLOGIC: GI   SKIN: AKs several, Scalp scar well-healed, discoloration on the face.    EXTREMITIES:No  LE edema bilaterally      Labs:    Hematology:  Lab Results   Component Value Date    WBC 10.7 (H) 09/21/2022    RBC 5.32 09/21/2022    HGB 18.8 (H) 09/21/2022    HCT 56.6 (H) 09/21/2022    .4 (H) 09/21/2022    MCH 35.3 (H) 09/21/2022    MCHC 33.2 09/21/2022    RDW 18.1 (H) 09/21/2022     09/21/2022    MPV 10.2 09/21/2022    BANDSPCT 3 (L) 10/11/2019    SEGSPCT 75.6 (H) 09/21/2022    EOSRELPCT 2.9 09/21/2022    BASOPCT 0.7 09/21/2022    LYMPHOPCT 16.6 (L) 09/21/2022    MONOPCT 4.2 (H) 09/21/2022    BANDABS 0.38 10/11/2019    SEGSABS 8.1 09/21/2022    EOSABS 0.3 09/21/2022    BASOSABS 0.1 09/21/2022    LYMPHSABS 1.8 09/21/2022    MONOSABS 0.5 09/21/2022    DIFFTYPE AUTOMATED DIFFERENTIAL 09/21/2022    ANISOCYTOSIS 1+ 12/27/2019    POLYCHROM 1+ 12/27/2019    WBCMORP FEW  ATYPICAL LYMPH(S) NOTED   12/23/2019    PLTM RARE LARGE PLATELETS SEEN ON SMEAR 10/11/2019     No results found for: ESR    Chemistry:  Lab Results   Component Value Date     09/21/2022    K 4.2 09/21/2022    CL 99 09/21/2022    CO2 25 09/21/2022    BUN 9 09/21/2022    CREATININE 1.0 09/21/2022    GLUCOSE 109 (H) 09/21/2022    CALCIUM 11.1 (H) 09/21/2022    PROT 7.4 09/21/2022    LABALBU 5.0 09/21/2022    BILITOT 0.7 09/21/2022    ALKPHOS 170 (H) 09/21/2022    AST 19 09/21/2022    ALT 16 09/21/2022    LABGLOM 54 (L) 09/21/2022    GFRAA >60 09/21/2022    MG 2.0 09/26/2015    POCCA 1.31 09/23/2015    POCGLU 108 (H) 10/04/2021     Lab Results   Component Value Date     09/21/2022     No components found for: LD  Lab Results   Component Value Date    TSHHS 2.720 05/06/2019    T4FREE 1.20 05/06/2019    FT3 2.8 11/08/2016       Immunology:  Lab Results   Component Value Date    PROT 7.4 09/21/2022     No results found for: Jocelyn Hdz, REGINALDOLCR  No results found for: B2M    Coagulation Panel:  Lab Results   Component Value Date    PROTIME 20.2 (H) 10/04/2021    INR 1.76 10/04/2021    APTT 31.3 09/17/2020       Anemia Panel:  Lab Results   Component Value Date    [id number] >2000 (H) 09/21/2022    FOLATE 5.9 09/21/2022       Tumor Markers:  No results found for: , CEA, , LABCA2, PSA      Imaging: Reviewed     Pathology:Reviewed     Observations:  Performance Status: ECOG 0  Depression Status: PHQ-9 Total Score: 0 (9/29/2022 10:46 AM)          Assessment & Plan:  PVera/Low ferritin: Low Erythropoetin and JAK2 + suggestive of polycythemia vera. Started Hydrea jan 2018, as H/O thrombosis(needing CABG) and also Possible TIA. leukocytosis in may 2019 was most probably sec to steroids, Flow at that point with left shift but otherwise normal.  Hydrea being adjusted according to CBC   Continue LD ASA  Phlebotomy to keep hct <46%, will order one     HTN: Follow up with PCP. Compliant with medication. Salt restriction, exercise and weight loss. Monitor regularily    Low b12: As needed  oral supplementation    Low back pain: has been referred to rheum, will follow on that. Iron indices and ferritin were suggestive of jeff. GI eval normal in 2021, no overt bleeding. Recommend continuation of oral iron  eod, adequate bowel regimen. TIA: Is on xarelto. LD ASA added back again. Phlebotomy to keep Hct <46%    Increased alk Po4: H/o cholecystectomy    Skin cancer/skin rash followed by dermatology    Easy bruising: PT/PTT normal    Elevated ELLA: has been referred to rheumatology    Screening: Is uptodate on mammogram(2021 in [place] was normal, due this year)and colonoscopies. Dexa scan 2019 normal. No further pap smears. Discussed the above findings and plan with the pt.  She verbalizes understanding. Answered all questions. Discussed healthy lifestyle including exercise and weight loss. Recommend follow up with PCP and other specialists. Please do not hesitate to call if you need any further information.     RTC  jan 2023 or earlier if new Taylor Lopez

## 2022-09-30 ENCOUNTER — CLINICAL DOCUMENTATION (OUTPATIENT)
Dept: ONCOLOGY | Age: 72
End: 2022-09-30

## 2022-10-03 ENCOUNTER — HOSPITAL ENCOUNTER (OUTPATIENT)
Dept: INFUSION THERAPY | Age: 72
Setting detail: INFUSION SERIES
Discharge: HOME | End: 2022-10-03
Payer: MEDICARE

## 2022-10-03 VITALS
RESPIRATION RATE: 16 BRPM | SYSTOLIC BLOOD PRESSURE: 144 MMHG | OXYGEN SATURATION: 93 % | HEART RATE: 88 BPM | TEMPERATURE: 97.3 F | DIASTOLIC BLOOD PRESSURE: 84 MMHG

## 2022-10-03 DIAGNOSIS — D45 POLYCYTHEMIA VERA (HCC): Primary | ICD-10-CM

## 2022-10-03 PROCEDURE — 99195 PHLEBOTOMY: CPT

## 2022-10-03 PROCEDURE — 99211 OFF/OP EST MAY X REQ PHY/QHP: CPT

## 2022-10-03 RX ORDER — 0.9 % SODIUM CHLORIDE 0.9 %
250 INTRAVENOUS SOLUTION INTRAVENOUS ONCE
OUTPATIENT
Start: 2022-10-03 | End: 2022-10-03

## 2022-10-03 NOTE — DISCHARGE INSTRUCTIONS
Discharge instructions:     *Do NOT skip meals today   *Drink PLENTY of liquids today, especially water   *Rest today   *Continue your medications as prescribed   *Return to your physician as planned    * If you feel a little lightheaded, lie down for a while,  and have some snacks. * Take your time when standing up from a sitting or  lying position     If you feel a little lightheaded, sit or lie back down     Have a snack or have something to drink    Call your doctor now or seek immediate medical care if:   *You are dizzy or lightheaded or feel like you may faint >1 day   * You have signs of infection, such as: Increased pain, swelling, warmth, or redness    Red streaks leading from the area    Pus draining from the area    A fever         Thank you for choosing [facility].  It is our pleasure to serve you    [facility]  Hours: 8:00-5:00  Phone: [phone]

## 2022-10-03 NOTE — PROGRESS NOTES
Diagnosis: POLYCYTHEMIA VERA    Pre-Phlebotomy: /87, HR 83, TEMP 97.3, SPO2 93%    Post-Phlebotomy: /84, HR 88    Volume Removed: 104 grams    Complications: NONE    Claudia Ibanez        LOT# [id number]    Exp: 5-23      Janet Nuno RN

## 2022-11-03 ENCOUNTER — HOSPITAL ENCOUNTER (OUTPATIENT)
Dept: INFUSION THERAPY | Age: 72
Discharge: HOME | End: 2022-11-03
Payer: MEDICARE

## 2022-11-03 DIAGNOSIS — D68.9 COAGULOPATHY (HCC): ICD-10-CM

## 2022-11-03 DIAGNOSIS — K90.9 INTESTINAL MALABSORPTION, UNSPECIFIED TYPE: ICD-10-CM

## 2022-11-03 DIAGNOSIS — C44.92 SQUAMOUS CELL CARCINOMA OF SKIN, UNSPECIFIED: ICD-10-CM

## 2022-11-03 DIAGNOSIS — D50.9 IRON DEFICIENCY ANEMIA, UNSPECIFIED IRON DEFICIENCY ANEMIA TYPE: ICD-10-CM

## 2022-11-03 DIAGNOSIS — D45 POLYCYTHEMIA VERA (HCC): ICD-10-CM

## 2022-11-03 DIAGNOSIS — E53.8 B12 DEFICIENCY: ICD-10-CM

## 2022-11-03 LAB
ALBUMIN SERPL-MCNC: 4.1 GM/DL (ref 3.4–5)
ALP BLD-CCNC: 158 IU/L (ref 40–129)
ALT SERPL-CCNC: 20 U/L (ref 10–40)
ANION GAP SERPL CALCULATED.3IONS-SCNC: 11 MMOL/L (ref 4–16)
AST SERPL-CCNC: 19 IU/L (ref 15–37)
BASOPHILS ABSOLUTE: 0.1 K/CU MM
BASOPHILS RELATIVE PERCENT: 0.5 % (ref 0–1)
BILIRUB SERPL-MCNC: 0.5 MG/DL (ref 0–1)
BUN BLDV-MCNC: 7 MG/DL (ref 6–23)
CALCIUM SERPL-MCNC: 10.5 MG/DL (ref 8.3–10.6)
CHLORIDE BLD-SCNC: 104 MMOL/L (ref 99–110)
CO2: 28 MMOL/L (ref 21–32)
CREAT SERPL-MCNC: 1 MG/DL (ref 0.6–1.1)
DIFFERENTIAL TYPE: ABNORMAL
EOSINOPHILS ABSOLUTE: 0.3 K/CU MM
EOSINOPHILS RELATIVE PERCENT: 2.2 % (ref 0–3)
FERRITIN: 45 NG/ML (ref 15–150)
GFR SERPL CREATININE-BSD FRML MDRD: 59 ML/MIN/1.73M2
GLUCOSE BLD-MCNC: 145 MG/DL (ref 70–99)
HCT VFR BLD CALC: 54.6 % (ref 37–47)
HEMOGLOBIN: 17.2 GM/DL (ref 12.5–16)
IRON: 73 UG/DL (ref 37–145)
LACTATE DEHYDROGENASE: 262 IU/L (ref 120–246)
LYMPHOCYTES ABSOLUTE: 1.6 K/CU MM
LYMPHOCYTES RELATIVE PERCENT: 11.8 % (ref 24–44)
MCH RBC QN AUTO: 35.2 PG (ref 27–31)
MCHC RBC AUTO-ENTMCNC: 31.5 % (ref 32–36)
MCV RBC AUTO: 111.9 FL (ref 78–100)
MONOCYTES ABSOLUTE: 0.3 K/CU MM
MONOCYTES RELATIVE PERCENT: 2.5 % (ref 0–4)
PCT TRANSFERRIN: 27 % (ref 10–44)
PDW BLD-RTO: 16.7 % (ref 11.7–14.9)
PLATELET # BLD: 385 K/CU MM (ref 140–440)
PMV BLD AUTO: 9.7 FL (ref 7.5–11.1)
POTASSIUM SERPL-SCNC: 4.2 MMOL/L (ref 3.5–5.1)
RBC # BLD: 4.88 M/CU MM (ref 4.2–5.4)
SEGMENTED NEUTROPHILS ABSOLUTE COUNT: 10.9 K/CU MM
SEGMENTED NEUTROPHILS RELATIVE PERCENT: 83 % (ref 36–66)
SODIUM BLD-SCNC: 143 MMOL/L (ref 135–145)
TOTAL IRON BINDING CAPACITY: 266 UG/DL (ref 250–450)
TOTAL PROTEIN: 6.8 GM/DL (ref 6.4–8.2)
UNSATURATED IRON BINDING CAPACITY: 193 UG/DL (ref 110–370)
WBC # BLD: 13.1 K/CU MM (ref 4–10.5)

## 2022-11-03 PROCEDURE — 80053 COMPREHEN METABOLIC PANEL: CPT

## 2022-11-03 PROCEDURE — 83550 IRON BINDING TEST: CPT

## 2022-11-03 PROCEDURE — 83615 LACTATE (LD) (LDH) ENZYME: CPT

## 2022-11-03 PROCEDURE — 83540 ASSAY OF IRON: CPT

## 2022-11-03 PROCEDURE — 36415 COLL VENOUS BLD VENIPUNCTURE: CPT

## 2022-11-03 PROCEDURE — 85025 COMPLETE CBC W/AUTO DIFF WBC: CPT

## 2022-11-03 PROCEDURE — 82728 ASSAY OF FERRITIN: CPT

## 2022-12-28 ENCOUNTER — CLINICAL DOCUMENTATION (OUTPATIENT)
Dept: INFUSION THERAPY | Age: 72
End: 2022-12-28

## 2022-12-28 ENCOUNTER — HOSPITAL ENCOUNTER (OUTPATIENT)
Dept: INFUSION THERAPY | Age: 72
Discharge: HOME | End: 2022-12-28
Payer: MEDICARE

## 2022-12-28 DIAGNOSIS — D45 POLYCYTHEMIA VERA (HCC): Primary | ICD-10-CM

## 2022-12-28 DIAGNOSIS — K90.9 INTESTINAL MALABSORPTION, UNSPECIFIED TYPE: ICD-10-CM

## 2022-12-28 DIAGNOSIS — E53.8 B12 DEFICIENCY: ICD-10-CM

## 2022-12-28 DIAGNOSIS — D68.9 COAGULOPATHY (HCC): ICD-10-CM

## 2022-12-28 DIAGNOSIS — D50.9 IRON DEFICIENCY ANEMIA, UNSPECIFIED IRON DEFICIENCY ANEMIA TYPE: ICD-10-CM

## 2022-12-28 DIAGNOSIS — C44.92 SQUAMOUS CELL CARCINOMA OF SKIN, UNSPECIFIED: ICD-10-CM

## 2022-12-28 DIAGNOSIS — D45 POLYCYTHEMIA VERA (HCC): ICD-10-CM

## 2022-12-28 LAB
BASOPHILS ABSOLUTE: 0.2 K/CU MM
BASOPHILS RELATIVE PERCENT: 0.9 % (ref 0–1)
DIFFERENTIAL TYPE: ABNORMAL
EOSINOPHILS ABSOLUTE: 0.7 K/CU MM
EOSINOPHILS RELATIVE PERCENT: 3.8 % (ref 0–3)
FERRITIN: 41 NG/ML (ref 15–150)
HCT VFR BLD CALC: 59.3 % (ref 37–47)
HEMOGLOBIN: 18.9 GM/DL (ref 12.5–16)
IRON: 49 UG/DL (ref 37–145)
LYMPHOCYTES ABSOLUTE: 1.9 K/CU MM
LYMPHOCYTES RELATIVE PERCENT: 10.8 % (ref 24–44)
MCH RBC QN AUTO: 31 PG (ref 27–31)
MCHC RBC AUTO-ENTMCNC: 31.9 % (ref 32–36)
MCV RBC AUTO: 97.2 FL (ref 78–100)
MONOCYTES ABSOLUTE: 0.4 K/CU MM
MONOCYTES RELATIVE PERCENT: 2.2 % (ref 0–4)
PCT TRANSFERRIN: 15 % (ref 10–44)
PDW BLD-RTO: 18.6 % (ref 11.7–14.9)
PLATELET # BLD: 479 K/CU MM (ref 140–440)
PMV BLD AUTO: 10.4 FL (ref 7.5–11.1)
RBC # BLD: 6.1 M/CU MM (ref 4.2–5.4)
SEGMENTED NEUTROPHILS ABSOLUTE COUNT: 14.5 K/CU MM
SEGMENTED NEUTROPHILS RELATIVE PERCENT: 82.3 % (ref 36–66)
TOTAL IRON BINDING CAPACITY: 317 UG/DL (ref 250–450)
UNSATURATED IRON BINDING CAPACITY: 268 UG/DL (ref 110–370)
WBC # BLD: 17.6 K/CU MM (ref 4–10.5)

## 2022-12-28 PROCEDURE — 83550 IRON BINDING TEST: CPT

## 2022-12-28 PROCEDURE — 83540 ASSAY OF IRON: CPT

## 2022-12-28 PROCEDURE — 85025 COMPLETE CBC W/AUTO DIFF WBC: CPT

## 2022-12-28 PROCEDURE — 82728 ASSAY OF FERRITIN: CPT

## 2022-12-28 PROCEDURE — 36415 COLL VENOUS BLD VENIPUNCTURE: CPT

## 2022-12-28 NOTE — PROGRESS NOTES
Order placed for therapeutic phlebotomy Q2 weeks x3 per physician order. Patient will be contacted by OP once phlebotomy scheduled.

## 2023-01-05 ENCOUNTER — OFFICE VISIT (OUTPATIENT)
Dept: ONCOLOGY | Age: 73
End: 2023-01-05
Payer: MEDICARE

## 2023-01-05 ENCOUNTER — HOSPITAL ENCOUNTER (OUTPATIENT)
Dept: INFUSION THERAPY | Age: 73
Setting detail: INFUSION SERIES
Discharge: HOME | End: 2023-01-05
Payer: MEDICARE

## 2023-01-05 ENCOUNTER — HOSPITAL ENCOUNTER (OUTPATIENT)
Dept: INFUSION THERAPY | Age: 73
Discharge: HOME | End: 2023-01-05
Payer: MEDICARE

## 2023-01-05 VITALS
DIASTOLIC BLOOD PRESSURE: 72 MMHG | TEMPERATURE: 97.2 F | HEART RATE: 102 BPM | RESPIRATION RATE: 16 BRPM | SYSTOLIC BLOOD PRESSURE: 123 MMHG | OXYGEN SATURATION: 96 %

## 2023-01-05 VITALS
TEMPERATURE: 97.3 F | OXYGEN SATURATION: 92 % | BODY MASS INDEX: 31.18 KG/M2 | HEIGHT: 63 IN | HEART RATE: 102 BPM | SYSTOLIC BLOOD PRESSURE: 134 MMHG | DIASTOLIC BLOOD PRESSURE: 84 MMHG | WEIGHT: 176 LBS

## 2023-01-05 DIAGNOSIS — C44.92 SQUAMOUS CELL CARCINOMA OF SKIN, UNSPECIFIED: ICD-10-CM

## 2023-01-05 DIAGNOSIS — K90.9 INTESTINAL MALABSORPTION, UNSPECIFIED TYPE: ICD-10-CM

## 2023-01-05 DIAGNOSIS — D45 POLYCYTHEMIA VERA (HCC): Primary | ICD-10-CM

## 2023-01-05 DIAGNOSIS — R10.9 FLANK PAIN: ICD-10-CM

## 2023-01-05 DIAGNOSIS — E53.8 B12 DEFICIENCY: ICD-10-CM

## 2023-01-05 DIAGNOSIS — D50.9 IRON DEFICIENCY ANEMIA, UNSPECIFIED IRON DEFICIENCY ANEMIA TYPE: ICD-10-CM

## 2023-01-05 LAB
BACTERIA: NEGATIVE /HPF
BILIRUBIN URINE: ABNORMAL MG/DL
BLOOD, URINE: NEGATIVE
CLARITY: CLEAR
COLOR: YELLOW
GLUCOSE, URINE: NEGATIVE MG/DL
KETONES, URINE: ABNORMAL MG/DL
LEUKOCYTE ESTERASE, URINE: ABNORMAL
MUCUS: ABNORMAL HPF
NITRITE URINE, QUANTITATIVE: NEGATIVE
PH, URINE: 5.5 (ref 5–8)
PROTEIN UA: 30 MG/DL
RBC URINE: ABNORMAL /HPF (ref 0–6)
SPECIFIC GRAVITY UA: 1.02 (ref 1–1.03)
SQUAMOUS EPITHELIAL: <1 /HPF
TRICHOMONAS: ABNORMAL /HPF
UROBILINOGEN, URINE: 0.2 MG/DL (ref 0.2–1)
WBC UA: 7 /HPF (ref 0–5)

## 2023-01-05 PROCEDURE — 81003 URINALYSIS AUTO W/O SCOPE: CPT

## 2023-01-05 PROCEDURE — 1123F ACP DISCUSS/DSCN MKR DOCD: CPT | Performed by: INTERNAL MEDICINE

## 2023-01-05 PROCEDURE — 99211 OFF/OP EST MAY X REQ PHY/QHP: CPT

## 2023-01-05 PROCEDURE — 99214 OFFICE O/P EST MOD 30 MIN: CPT | Performed by: INTERNAL MEDICINE

## 2023-01-05 PROCEDURE — 99195 PHLEBOTOMY: CPT

## 2023-01-05 RX ORDER — 0.9 % SODIUM CHLORIDE 0.9 %
250 INTRAVENOUS SOLUTION INTRAVENOUS ONCE
Status: CANCELLED | OUTPATIENT
Start: 2023-01-05 | End: 2023-01-05

## 2023-01-05 RX ORDER — 0.9 % SODIUM CHLORIDE 0.9 %
250 INTRAVENOUS SOLUTION INTRAVENOUS ONCE
OUTPATIENT
Start: 2023-01-19 | End: 2023-01-19

## 2023-01-05 NOTE — PROGRESS NOTES
Patient Name: Megan Vasques  Patient : 1950  Patient MRN: [id number]     Primary Oncologist: Rema Matos MD  PCP: Dr Sami Sterling        Date of Service: 2023      Chief Complaint:   Chief Complaint   Patient presents with    Follow-up        Active Problem list  1. Polycythemia vera ([address]    2. Squamous cell carcinoma of skin, unspecified    3. B12 deficiency    4. Iron deficiency anemia, unspecified iron deficiency anemia type    5. Intestinal malabsorption, unspecified type    6. Flank pain           HPI:        Referred in 2016 for polycythemia, CBC with wbc 13.6, Hb 14.4 hct 47.8, plt 513K  16 Jak2 postive  Was started on Hydrea as h/o MI needing CABG and also h/o TIA x 2, dose adjusted as needed. Then with SHAILA, had EGD, Cscope and VCE in may 2016  Cscope with diverticulosis and nonbleeding internal hemorrhoids  EGD with erythematous stomach but biopsy neg for h.pylori or malignancy  Capsule endoscopy with apthous ulcers in the small bowel. 18: Hydrea 1000mg OD    2018: Ct abdomen and pelvis:Impression  No renal or ureteral stone. No bladder stone. Distal left ureter does not completely opacify but otherwise appears  unremarkable. Otherwise no filling defect within the renal or ureteral  collecting systems. Mild irregularity along the posterior bladder wall which may be related to  ureterovesical junction bilaterally. Recommend further evaluation with  cystoscopy given hematuria.       19: CBC with wbc 11, Hb 20.2, hct 60, mcv 105, plt 171K  Creatinine 1.06, alk [id number]  ldh 296    3/12/19:CBC with wbc 11.9, Hb 15.6, hct 49.2, mcv 106, plt 384K  Ferritin 9  sats 7    2020 LDH of 149 CMP with sodium 138 potassium 4.6 glucose of 37 creatinine 1 calcium 10.3 ALT 8 AST 10 alk phos 115 CBC with WBC 11.7 hemoglobin 13.1 hematocrit 47.7 MCV of 101.7 platelets of 807      2020 EGD with Possible barrettes but biopsy negative     Had phlebotomy  and 2021 ASA and hydrea were held sec to cytopenia    4/18/21: Presented to ER with left jaw swelling and pain,which started 4/17/21. No fever. No bleeding. . 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 Parotid gland   appears normal.     6/29/2021 no DVT in the right lower extremity    9/20/2021 CBC with hemoglobin of 16.2 hematocrit of 50.9 platelets of 816, ferritin of 53    9/27/21: left bunionectomy    10/7/21: TIA sx.    10/4/21: CXR/CT head/CTA neck/head negative    PMH:   Polycythemia vera  Hypothyroidism  CAD  Lower back pain  HTN  HLP    PSH:  left rotator cuff repair 2017  Laminectomies x 2  cholecystectomy  hernia repair  removal of renal ca;culi  Total thyroidectomy  CABG  hemorrhoidectomy x 2  Hysterectomy  Lumpectomy left breast, non malignant  Eye lid surgery    Allergies: None    SH: Grand son lives with her. 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 FH: Brother has leukemia ?type. No other blood dyscrasias    Interval History  1/5/2023:Arrived alone to the clinic today. Is on hydrea. Is on oral iron bid. No fever. Reported generalized aches and pains. Reported that there was skin lesion which was removed from upper back, was cancerous. No further resection recommended by dermatology. No new scalp lesions. No lymphadenopathy. It would increase shortness of breath. Reported pain in the substernal area. Pain resolves when she grabs that area. No night sweats. Reported that she has been having bilateral flank pain for the past 6 months but lately has been worsening. Reported hemorrhoidal bleed. No  symptoms but the pain in the flank area gets better after she urinates at night. Vertigo in dark rooms, no vision changes or any focal weakness.       Review of Systems   Per interval history; otherwise 10 point ROS is negative              Vital Signs:  /84 (Site: Right Upper Arm, Position: Sitting, Cuff Size: Medium Adult)   Pulse (!) 102   Temp 97.3 °F (36.3 °C) (Infrared)   Ht 5' 3\" (1.6 m)   Wt 176 lb (79.8 kg)   SpO2 92%   BMI 31.18 kg/m²     Physical Exam:    CONSTITUTIONAL: awake, alert, overweight  EYES:No palor or icterus   ENT:NCAT  NECK: No JVD   HEMATOLOGIC/LYMPHATIC: no cervical, supraclavicular or axillary lymphadenopathy   LUNGS: CTAB  CARDIOVASCULAR:Sternal scar healed well. s1s2 rrr SM  ABDOMEN: soft ntnd bs pos, no hsm  NEUROLOGIC: GI   SKIN: AKs several, Scalp scar well-healed, discoloration on the face.    EXTREMITIES:No  LE edema bilaterally      Labs:    Hematology:  Lab Results   Component Value Date    WBC 17.6 (H) 12/28/2022    RBC 6.10 (H) 12/28/2022    HGB 18.9 (H) 12/28/2022    HCT 59.3 (H) 12/28/2022    MCV 97.2 12/28/2022    MCH 31.0 12/28/2022    MCHC 31.9 (L) 12/28/2022    RDW 18.6 (H) 12/28/2022     (H) 12/28/2022    MPV 10.4 12/28/2022    BANDSPCT 3 (L) 10/11/2019    SEGSPCT 82.3 (H) 12/28/2022    EOSRELPCT 3.8 (H) 12/28/2022    BASOPCT 0.9 12/28/2022    LYMPHOPCT 10.8 (L) 12/28/2022    MONOPCT 2.2 12/28/2022    BANDABS 0.38 10/11/2019    SEGSABS 14.5 12/28/2022    EOSABS 0.7 12/28/2022    BASOSABS 0.2 12/28/2022    LYMPHSABS 1.9 12/28/2022    MONOSABS 0.4 12/28/2022    DIFFTYPE AUTOMATED DIFFERENTIAL 12/28/2022    ANISOCYTOSIS 1+ 12/27/2019    POLYCHROM 1+ 12/27/2019    WBCMORP FEW  ATYPICAL LYMPH(S) NOTED   12/23/2019    PLTM RARE LARGE PLATELETS SEEN ON SMEAR 10/11/2019     No results found for: ESR    Chemistry:  Lab Results   Component Value Date     11/03/2022    K 4.2 11/03/2022     11/03/2022    CO2 28 11/03/2022    BUN 7 11/03/2022    CREATININE 1.0 11/03/2022    GLUCOSE 145 (H) 11/03/2022    CALCIUM 10.5 11/03/2022    PROT 6.8 11/03/2022    LABALBU 4.1 11/03/2022    BILITOT 0.5 11/03/2022    ALKPHOS 158 (H) 11/03/2022    AST 19 11/03/2022    ALT 20 11/03/2022    LABGLOM 59 (L) 11/03/2022    GFRAA >60 09/21/2022    MG 2.0 09/26/2015    POCCA 1.31 09/23/2015    POCGLU 108 (H) 10/04/2021     Lab Results   Component Value Date     (H) 11/03/2022     No components found for: LD  Lab Results   Component Value Date    TSHHS 2.720 05/06/2019    T4FREE 1.20 05/06/2019    FT3 2.8 11/08/2016       Immunology:  Lab Results   Component Value Date    PROT 6.8 11/03/2022     No results found for: Dariusz Perea, REGINALDOLCR  No results found for: B2M    Coagulation Panel:  Lab Results   Component Value Date    PROTIME 20.2 (H) 10/04/2021    INR 1.76 10/04/2021    APTT 31.3 09/17/2020       Anemia Panel:  Lab Results   Component Value Date    MVJFYQKH28 >2000 (H) 09/21/2022    FOLATE 5.9 09/21/2022       Tumor Markers:  No results found for: , CEA, , LABCA2, PSA      Imaging: Reviewed     Pathology:Reviewed     Observations:  Performance Status: ECOG 0  Depression Status: No data recorded          Assessment & Plan:  PVera/Low ferritin: Low Erythropoetin and JAK2 + suggestive of polycythemia vera. Started Hydrea jan 2018, as H/O thrombosis(needing CABG) and also Possible TIA. leukocytosis in may 2019 was most probably sec to steroids, Flow at that point with left shift but otherwise normal.  Recommend Hydrea 1000 mg every day. Continue LD ASA  Phlebotomy to keep hct <46%, ordered again. HTN: Better controlled today    Low b12: As needed  oral supplementation    Bilateral flank pain, UA and urine cultures ordered and also ordered CT urogram.    Iron indices and ferritin were suggestive of jeff. GI eval normal in 2021, no overt bleeding. Recommend continuation of oral iron  eod, adequate bowel regimen. TIA: Is on xarelto. LD ASA added back again.  Phlebotomy to keep Hct <46%    Increased alk Po4: Stable, continue to monitor    Skin cancer/skin rash followed by dermatology    Easy bruising: PT/PTT normal    Elevated ELLA: has been referred to rheumatology    Screening: Is uptodate on mammogram(2021 in [place] was normal, due this year)and colonoscopies. Dexa scan 2019 normal. No further pap smears. Discussed the above findings and plan with the pt. She verbalizes understanding. Answered all questions. Discussed healthy lifestyle including exercise and weight loss. Recommend follow up with PCP and other specialists. Please do not hesitate to call if you need any further information.     RTC April 2023 or earlier if new Sx.       Monika Lagos

## 2023-01-05 NOTE — PROGRESS NOTES
MA Rooming Questions  Patient: Jameel Gloria  MRN: [id number]    Date: 1/5/2023        1. Do you have any new issues?   no         2. Do you need any refills on medications?    no    3. Have you had any imaging done since your last visit?   no    4. Have you been hospitalized or seen in the emergency room since your last visit here?   no    5. Did the patient have a depression screening completed today?  No    No data recorded     PHQ-9 Given to (if applicable):               PHQ-9 Score (if applicable):                     [] Positive     []  Negative              Does question #9 need addressed (if applicable)                     [] Yes    []  No               Genia Rosenberg CMA

## 2023-01-08 LAB
REASON FOR REJECTION: NORMAL
REJECTED TEST: NORMAL

## 2023-01-10 DIAGNOSIS — R10.9 FLANK PAIN: Primary | ICD-10-CM

## 2023-01-10 DIAGNOSIS — R82.90 ABNORMAL URINALYSIS: ICD-10-CM

## 2023-01-10 NOTE — PROGRESS NOTES
UA results from 01/05/2023 reviewed. Physician recommending that patient follow up with nephrology. Called patient @ [phone] to notify. Patient agreeable. Order placed for nephrology referral to Dr. Ewelina Coe per physician order. No further needs addressed at this time.

## 2023-01-11 ENCOUNTER — HOSPITAL ENCOUNTER (OUTPATIENT)
Dept: CT IMAGING | Age: 73
Discharge: HOME | End: 2023-01-11
Payer: MEDICARE

## 2023-01-11 DIAGNOSIS — R10.9 FLANK PAIN: ICD-10-CM

## 2023-01-11 LAB
EGFR, POC: 53 ML/MIN/1.73M2
POC CREATININE: 1.1 MG/DL (ref 0.6–1.1)

## 2023-01-11 PROCEDURE — 6360000004 HC RX CONTRAST MEDICATION: Performed by: INTERNAL MEDICINE

## 2023-01-11 PROCEDURE — 74178 CT ABD&PLV WO CNTR FLWD CNTR: CPT | Performed by: INTERNAL MEDICINE

## 2023-01-11 RX ORDER — 0.9 % SODIUM CHLORIDE 0.9 %
20 VIAL (ML) INJECTION
Status: COMPLETED | OUTPATIENT
Start: 2023-01-11 | End: 2023-01-11

## 2023-01-11 RX ADMIN — Medication 20 ML: at 08:11

## 2023-01-11 RX ADMIN — IOPAMIDOL 120 ML: 755 INJECTION, SOLUTION INTRAVENOUS at 08:11

## 2023-01-17 ENCOUNTER — CLINICAL DOCUMENTATION (OUTPATIENT)
Dept: ONCOLOGY | Age: 73
End: 2023-01-17

## 2023-01-19 ENCOUNTER — HOSPITAL ENCOUNTER (OUTPATIENT)
Dept: INFUSION THERAPY | Age: 73
Setting detail: INFUSION SERIES
Discharge: HOME | End: 2023-01-19
Payer: MEDICARE

## 2023-01-19 VITALS
SYSTOLIC BLOOD PRESSURE: 121 MMHG | RESPIRATION RATE: 16 BRPM | TEMPERATURE: 97.3 F | HEART RATE: 96 BPM | OXYGEN SATURATION: 95 % | DIASTOLIC BLOOD PRESSURE: 70 MMHG

## 2023-01-19 DIAGNOSIS — D45 POLYCYTHEMIA VERA (HCC): Primary | ICD-10-CM

## 2023-01-19 PROCEDURE — 99211 OFF/OP EST MAY X REQ PHY/QHP: CPT

## 2023-01-19 PROCEDURE — 99195 PHLEBOTOMY: CPT

## 2023-01-19 RX ORDER — 0.9 % SODIUM CHLORIDE 0.9 %
250 INTRAVENOUS SOLUTION INTRAVENOUS ONCE
OUTPATIENT
Start: 2023-02-02 | End: 2023-02-02

## 2023-01-19 NOTE — PROGRESS NOTES
Diagnosis: polycythemia vera    Pre-Phlebotomy: /74, HR 95    Post-Phlebotomy: /70, HR 96    Volume Removed: 713 grams    Complications: none    Comments: right arm      Haritha Leigh RN

## 2023-02-02 ENCOUNTER — HOSPITAL ENCOUNTER (OUTPATIENT)
Dept: INFUSION THERAPY | Age: 73
Setting detail: INFUSION SERIES
Discharge: HOME | End: 2023-02-02
Payer: MEDICARE

## 2023-02-02 VITALS
DIASTOLIC BLOOD PRESSURE: 69 MMHG | TEMPERATURE: 96.8 F | OXYGEN SATURATION: 95 % | HEART RATE: 82 BPM | RESPIRATION RATE: 16 BRPM | SYSTOLIC BLOOD PRESSURE: 117 MMHG

## 2023-02-02 DIAGNOSIS — D45 POLYCYTHEMIA VERA (HCC): Primary | ICD-10-CM

## 2023-02-02 PROCEDURE — 99195 PHLEBOTOMY: CPT

## 2023-02-02 PROCEDURE — 99211 OFF/OP EST MAY X REQ PHY/QHP: CPT

## 2023-02-02 RX ORDER — 0.9 % SODIUM CHLORIDE 0.9 %
250 INTRAVENOUS SOLUTION INTRAVENOUS ONCE
OUTPATIENT
Start: 2023-02-02 | End: 2023-02-02

## 2023-02-02 ASSESSMENT — PAIN DESCRIPTION - ONSET: ONSET: ON-GOING

## 2023-02-02 ASSESSMENT — PAIN DESCRIPTION - LOCATION: LOCATION: FLANK

## 2023-02-02 ASSESSMENT — PAIN DESCRIPTION - ORIENTATION: ORIENTATION: LEFT

## 2023-02-02 ASSESSMENT — PAIN DESCRIPTION - FREQUENCY: FREQUENCY: INTERMITTENT

## 2023-02-02 ASSESSMENT — PAIN SCALES - GENERAL: PAINLEVEL_OUTOF10: 9

## 2023-02-02 ASSESSMENT — PAIN DESCRIPTION - PAIN TYPE: TYPE: CHRONIC PAIN;ACUTE PAIN

## 2023-02-02 ASSESSMENT — PAIN DESCRIPTION - DESCRIPTORS: DESCRIPTORS: THROBBING

## 2023-02-02 NOTE — PROGRESS NOTES
Diagnosis: polycythemia vera    Pre-Phlebotomy: /70, HR 87    Post-Phlebotomy: /69, HR 82    Volume Removed: 665 grams    Complications: none    Comments: right arm        Travis Tapia RN

## 2023-03-16 ENCOUNTER — HOSPITAL ENCOUNTER (OUTPATIENT)
Dept: ULTRASOUND IMAGING | Age: 73
Discharge: HOME | End: 2023-03-16
Payer: MEDICARE

## 2023-03-16 ENCOUNTER — HOSPITAL ENCOUNTER (OUTPATIENT)
Dept: NUCLEAR MEDICINE | Age: 73
Discharge: HOME | End: 2023-03-16
Payer: MEDICARE

## 2023-03-16 DIAGNOSIS — E83.52 HYPERCALCEMIA: ICD-10-CM

## 2023-03-16 PROCEDURE — A9500 TC99M SESTAMIBI: HCPCS | Performed by: FAMILY MEDICINE

## 2023-03-16 PROCEDURE — 76536 US EXAM OF HEAD AND NECK: CPT

## 2023-03-16 PROCEDURE — 3430000000 HC RX DIAGNOSTIC RADIOPHARMACEUTICAL: Performed by: FAMILY MEDICINE

## 2023-03-16 PROCEDURE — 78072 PARATHYRD PLANAR W/SPECT&CT: CPT

## 2023-03-16 RX ORDER — TECHNETIUM TC-99M SESTAMIBI 1 MG/10ML
24.5 INJECTION INTRAVENOUS
Status: COMPLETED | OUTPATIENT
Start: 2023-03-16 | End: 2023-03-16

## 2023-03-16 RX ADMIN — KIT FOR THE PREPARATION OF TECHNETIUM TC99M SESTAMIBI 24.5 MILLICURIE: 1 INJECTION, POWDER, LYOPHILIZED, FOR SOLUTION PARENTERAL at 12:20

## 2023-04-13 PROBLEM — E21.3 HYPERPARATHYROIDISM (HCC): Status: ACTIVE | Noted: 2023-04-13

## 2023-04-25 ENCOUNTER — HOSPITAL ENCOUNTER (OUTPATIENT)
Dept: INFUSION THERAPY | Age: 73
Discharge: HOME | End: 2023-04-25
Payer: MEDICARE

## 2023-04-25 DIAGNOSIS — D45 POLYCYTHEMIA VERA (HCC): ICD-10-CM

## 2023-04-25 LAB
ALBUMIN SERPL-MCNC: 3.7 GM/DL (ref 3.4–5)
ALP BLD-CCNC: 178 IU/L (ref 40–129)
ALT SERPL-CCNC: 12 U/L (ref 10–40)
ANION GAP SERPL CALCULATED.3IONS-SCNC: 11 MMOL/L (ref 4–16)
AST SERPL-CCNC: 12 IU/L (ref 15–37)
BASOPHILS ABSOLUTE: 0.1 K/CU MM
BASOPHILS RELATIVE PERCENT: 0.5 % (ref 0–1)
BILIRUB SERPL-MCNC: 0.7 MG/DL (ref 0–1)
BUN SERPL-MCNC: 9 MG/DL (ref 6–23)
CALCIUM SERPL-MCNC: 10.1 MG/DL (ref 8.3–10.6)
CHLORIDE BLD-SCNC: 101 MMOL/L (ref 99–110)
CO2: 29 MMOL/L (ref 21–32)
CREAT SERPL-MCNC: 0.9 MG/DL (ref 0.6–1.1)
DIFFERENTIAL TYPE: ABNORMAL
EOSINOPHILS ABSOLUTE: 0.7 K/CU MM
EOSINOPHILS RELATIVE PERCENT: 2.5 % (ref 0–3)
FERRITIN: 77 NG/ML (ref 15–150)
FOLATE SERPL-MCNC: 2.5 NG/ML (ref 3.1–17.5)
GFR SERPL CREATININE-BSD FRML MDRD: >60 ML/MIN/1.73M2
GLUCOSE SERPL-MCNC: 84 MG/DL (ref 70–99)
HCT VFR BLD CALC: 57.3 % (ref 37–47)
HEMOGLOBIN: 16.3 GM/DL (ref 12.5–16)
IRON: 26 UG/DL (ref 37–145)
LACTATE DEHYDROGENASE: 390 IU/L (ref 120–246)
LYMPHOCYTES ABSOLUTE: 1.6 K/CU MM
LYMPHOCYTES RELATIVE PERCENT: 6 % (ref 24–44)
MCH RBC QN AUTO: 22.9 PG (ref 27–31)
MCHC RBC AUTO-ENTMCNC: 28.4 % (ref 32–36)
MCV RBC AUTO: 80.6 FL (ref 78–100)
MONOCYTES ABSOLUTE: 0.6 K/CU MM
MONOCYTES RELATIVE PERCENT: 2.2 % (ref 0–4)
PCT TRANSFERRIN: 11 % (ref 10–44)
PDW BLD-RTO: 22.6 % (ref 11.7–14.9)
PLATELET # BLD: 726 K/CU MM (ref 140–440)
PMV BLD AUTO: 10.1 FL (ref 7.5–11.1)
POTASSIUM SERPL-SCNC: 4.8 MMOL/L (ref 3.5–5.1)
RBC # BLD: 7.11 M/CU MM (ref 4.2–5.4)
SEGMENTED NEUTROPHILS ABSOLUTE COUNT: 24 K/CU MM
SEGMENTED NEUTROPHILS RELATIVE PERCENT: 88.8 % (ref 36–66)
SODIUM BLD-SCNC: 141 MMOL/L (ref 135–145)
TOTAL IRON BINDING CAPACITY: 235 UG/DL (ref 250–450)
TOTAL PROTEIN: 6.2 GM/DL (ref 6.4–8.2)
UNSATURATED IRON BINDING CAPACITY: 209 UG/DL (ref 110–370)
VITAMIN B-12: 1558 PG/ML (ref 211–911)
WBC # BLD: 27 K/CU MM (ref 4–10.5)

## 2023-04-25 PROCEDURE — 83550 IRON BINDING TEST: CPT

## 2023-04-25 PROCEDURE — 80053 COMPREHEN METABOLIC PANEL: CPT

## 2023-04-25 PROCEDURE — 83615 LACTATE (LD) (LDH) ENZYME: CPT

## 2023-04-25 PROCEDURE — 82746 ASSAY OF FOLIC ACID SERUM: CPT

## 2023-04-25 PROCEDURE — 83540 ASSAY OF IRON: CPT

## 2023-04-25 PROCEDURE — 36415 COLL VENOUS BLD VENIPUNCTURE: CPT

## 2023-04-25 PROCEDURE — 85025 COMPLETE CBC W/AUTO DIFF WBC: CPT

## 2023-04-25 PROCEDURE — 82607 VITAMIN B-12: CPT

## 2023-04-25 PROCEDURE — 82728 ASSAY OF FERRITIN: CPT

## 2023-05-11 ENCOUNTER — HOSPITAL ENCOUNTER (OUTPATIENT)
Dept: INFUSION THERAPY | Age: 73
Discharge: HOME | End: 2023-05-11
Payer: MEDICARE

## 2023-05-11 ENCOUNTER — OFFICE VISIT (OUTPATIENT)
Dept: ONCOLOGY | Age: 73
End: 2023-05-11
Payer: MEDICARE

## 2023-05-11 VITALS
HEART RATE: 82 BPM | TEMPERATURE: 97.8 F | OXYGEN SATURATION: 90 % | SYSTOLIC BLOOD PRESSURE: 132 MMHG | BODY MASS INDEX: 27.79 KG/M2 | DIASTOLIC BLOOD PRESSURE: 63 MMHG | WEIGHT: 162.8 LBS | HEIGHT: 64 IN

## 2023-05-11 DIAGNOSIS — E53.8 B12 DEFICIENCY: ICD-10-CM

## 2023-05-11 DIAGNOSIS — D50.9 IRON DEFICIENCY ANEMIA, UNSPECIFIED IRON DEFICIENCY ANEMIA TYPE: ICD-10-CM

## 2023-05-11 DIAGNOSIS — D45 POLYCYTHEMIA VERA (HCC): Primary | ICD-10-CM

## 2023-05-11 DIAGNOSIS — K90.9 INTESTINAL MALABSORPTION, UNSPECIFIED TYPE: ICD-10-CM

## 2023-05-11 DIAGNOSIS — C44.92 SQUAMOUS CELL CARCINOMA OF SKIN, UNSPECIFIED: ICD-10-CM

## 2023-05-11 DIAGNOSIS — D45 POLYCYTHEMIA VERA (HCC): ICD-10-CM

## 2023-05-11 LAB
BASOPHILS ABSOLUTE: 0.1 K/CU MM
BASOPHILS RELATIVE PERCENT: 0.5 % (ref 0–1)
DIFFERENTIAL TYPE: ABNORMAL
EOSINOPHILS ABSOLUTE: 0.7 K/CU MM
EOSINOPHILS RELATIVE PERCENT: 2.8 % (ref 0–3)
HCT VFR BLD CALC: 59.4 % (ref 37–47)
HEMOGLOBIN: 16.7 GM/DL (ref 12.5–16)
LYMPHOCYTES ABSOLUTE: 1.5 K/CU MM
LYMPHOCYTES RELATIVE PERCENT: 6.3 % (ref 24–44)
MCH RBC QN AUTO: 22.7 PG (ref 27–31)
MCHC RBC AUTO-ENTMCNC: 28.1 % (ref 32–36)
MCV RBC AUTO: 80.6 FL (ref 78–100)
MONOCYTES ABSOLUTE: 0.4 K/CU MM
MONOCYTES RELATIVE PERCENT: 1.7 % (ref 0–4)
PDW BLD-RTO: 22.5 % (ref 11.7–14.9)
PLATELET # BLD: 467 K/CU MM (ref 140–440)
PMV BLD AUTO: 10.7 FL (ref 7.5–11.1)
RBC # BLD: 7.37 M/CU MM (ref 4.2–5.4)
REASON FOR REJECTION: NORMAL
REJECTED TEST: NORMAL
SEGMENTED NEUTROPHILS ABSOLUTE COUNT: 21.1 K/CU MM
SEGMENTED NEUTROPHILS RELATIVE PERCENT: 88.7 % (ref 36–66)
WBC # BLD: 23.8 K/CU MM (ref 4–10.5)

## 2023-05-11 PROCEDURE — 36415 COLL VENOUS BLD VENIPUNCTURE: CPT

## 2023-05-11 PROCEDURE — 1123F ACP DISCUSS/DSCN MKR DOCD: CPT | Performed by: INTERNAL MEDICINE

## 2023-05-11 PROCEDURE — 99211 OFF/OP EST MAY X REQ PHY/QHP: CPT

## 2023-05-11 PROCEDURE — 99214 OFFICE O/P EST MOD 30 MIN: CPT | Performed by: INTERNAL MEDICINE

## 2023-05-11 PROCEDURE — 85025 COMPLETE CBC W/AUTO DIFF WBC: CPT

## 2023-05-11 ASSESSMENT — PATIENT HEALTH QUESTIONNAIRE - PHQ9
SUM OF ALL RESPONSES TO PHQ9 QUESTIONS 1 & 2: 0
1. LITTLE INTEREST OR PLEASURE IN DOING THINGS: 0
2. FEELING DOWN, DEPRESSED OR HOPELESS: 0
SUM OF ALL RESPONSES TO PHQ QUESTIONS 1-9: 0

## 2023-06-09 ENCOUNTER — HOSPITAL ENCOUNTER (OUTPATIENT)
Dept: INFUSION THERAPY | Age: 73
Discharge: HOME | End: 2023-06-09
Payer: MEDICARE

## 2023-06-09 ENCOUNTER — OFFICE VISIT (OUTPATIENT)
Dept: ONCOLOGY | Age: 73
End: 2023-06-09
Payer: MEDICARE

## 2023-06-09 VITALS
TEMPERATURE: 97.5 F | HEART RATE: 77 BPM | HEIGHT: 63 IN | SYSTOLIC BLOOD PRESSURE: 132 MMHG | DIASTOLIC BLOOD PRESSURE: 78 MMHG | BODY MASS INDEX: 28.24 KG/M2 | WEIGHT: 159.4 LBS | OXYGEN SATURATION: 92 %

## 2023-06-09 DIAGNOSIS — D45 POLYCYTHEMIA VERA (HCC): ICD-10-CM

## 2023-06-09 DIAGNOSIS — D45 POLYCYTHEMIA VERA (HCC): Primary | ICD-10-CM

## 2023-06-09 DIAGNOSIS — C44.92 SQUAMOUS CELL CARCINOMA OF SKIN, UNSPECIFIED: ICD-10-CM

## 2023-06-09 DIAGNOSIS — E53.8 B12 DEFICIENCY: ICD-10-CM

## 2023-06-09 DIAGNOSIS — D50.9 IRON DEFICIENCY ANEMIA, UNSPECIFIED IRON DEFICIENCY ANEMIA TYPE: ICD-10-CM

## 2023-06-09 DIAGNOSIS — K90.9 INTESTINAL MALABSORPTION, UNSPECIFIED TYPE: ICD-10-CM

## 2023-06-09 LAB
ALBUMIN SERPL-MCNC: 3.8 GM/DL (ref 3.4–5)
ALP BLD-CCNC: 123 IU/L (ref 40–129)
ALT SERPL-CCNC: 7 U/L (ref 10–40)
ANION GAP SERPL CALCULATED.3IONS-SCNC: 10 MMOL/L (ref 4–16)
AST SERPL-CCNC: 15 IU/L (ref 15–37)
BASOPHILS ABSOLUTE: 0 K/CU MM
BASOPHILS RELATIVE PERCENT: 0.6 % (ref 0–1)
BILIRUB SERPL-MCNC: 0.8 MG/DL (ref 0–1)
BUN SERPL-MCNC: 10 MG/DL (ref 6–23)
CALCIUM SERPL-MCNC: 7.8 MG/DL (ref 8.3–10.6)
CHLORIDE BLD-SCNC: 100 MMOL/L (ref 99–110)
CO2: 28 MMOL/L (ref 21–32)
CREAT SERPL-MCNC: 0.8 MG/DL (ref 0.6–1.1)
DIFFERENTIAL TYPE: ABNORMAL
EOSINOPHILS ABSOLUTE: 0.2 K/CU MM
EOSINOPHILS RELATIVE PERCENT: 3.3 % (ref 0–3)
FERRITIN: 172 NG/ML (ref 15–150)
GFR SERPL CREATININE-BSD FRML MDRD: >60 ML/MIN/1.73M2
GLUCOSE SERPL-MCNC: 84 MG/DL (ref 70–99)
HCT VFR BLD CALC: 50.6 % (ref 37–47)
HEMOGLOBIN: 14.6 GM/DL (ref 12.5–16)
IRON: 101 UG/DL (ref 37–145)
LACTATE DEHYDROGENASE: 277 IU/L (ref 120–246)
LYMPHOCYTES ABSOLUTE: 1.3 K/CU MM
LYMPHOCYTES RELATIVE PERCENT: 17.6 % (ref 24–44)
MCH RBC QN AUTO: 24.6 PG (ref 27–31)
MCHC RBC AUTO-ENTMCNC: 28.9 % (ref 32–36)
MCV RBC AUTO: 85.2 FL (ref 78–100)
MONOCYTES ABSOLUTE: 0.1 K/CU MM
MONOCYTES RELATIVE PERCENT: 1.5 % (ref 0–4)
PCT TRANSFERRIN: 48 % (ref 10–44)
PDW BLD-RTO: 25.3 % (ref 11.7–14.9)
PLATELET # BLD: 200 K/CU MM (ref 140–440)
PMV BLD AUTO: 9.3 FL (ref 7.5–11.1)
POTASSIUM SERPL-SCNC: 4.5 MMOL/L (ref 3.5–5.1)
RBC # BLD: 5.94 M/CU MM (ref 4.2–5.4)
SEGMENTED NEUTROPHILS ABSOLUTE COUNT: 5.6 K/CU MM
SEGMENTED NEUTROPHILS RELATIVE PERCENT: 77 % (ref 36–66)
SODIUM BLD-SCNC: 138 MMOL/L (ref 135–145)
TOTAL IRON BINDING CAPACITY: 209 UG/DL (ref 250–450)
TOTAL PROTEIN: 6.1 GM/DL (ref 6.4–8.2)
UNSATURATED IRON BINDING CAPACITY: 108 UG/DL (ref 110–370)
WBC # BLD: 7.2 K/CU MM (ref 4–10.5)

## 2023-06-09 PROCEDURE — 83550 IRON BINDING TEST: CPT

## 2023-06-09 PROCEDURE — 85025 COMPLETE CBC W/AUTO DIFF WBC: CPT

## 2023-06-09 PROCEDURE — 80053 COMPREHEN METABOLIC PANEL: CPT

## 2023-06-09 PROCEDURE — 36415 COLL VENOUS BLD VENIPUNCTURE: CPT

## 2023-06-09 PROCEDURE — 99214 OFFICE O/P EST MOD 30 MIN: CPT | Performed by: INTERNAL MEDICINE

## 2023-06-09 PROCEDURE — 83615 LACTATE (LD) (LDH) ENZYME: CPT

## 2023-06-09 PROCEDURE — 82728 ASSAY OF FERRITIN: CPT

## 2023-06-09 PROCEDURE — 83540 ASSAY OF IRON: CPT

## 2023-06-09 PROCEDURE — 1123F ACP DISCUSS/DSCN MKR DOCD: CPT | Performed by: INTERNAL MEDICINE

## 2023-06-09 ASSESSMENT — PATIENT HEALTH QUESTIONNAIRE - PHQ9
1. LITTLE INTEREST OR PLEASURE IN DOING THINGS: 0
2. FEELING DOWN, DEPRESSED OR HOPELESS: 0
SUM OF ALL RESPONSES TO PHQ QUESTIONS 1-9: 0
SUM OF ALL RESPONSES TO PHQ9 QUESTIONS 1 & 2: 0
SUM OF ALL RESPONSES TO PHQ QUESTIONS 1-9: 0

## 2023-06-09 NOTE — PROGRESS NOTES
MA Rooming Questions  Patient: Rosa Murillo  MRN: [id number]    Date: 6/9/2023        1. Do you have any new issues? yes - PATIENT HAD PARATHYROID SURGERY   5/30       2. Do you need any refills on medications?    no    3. Have you had any imaging done since your last visit? yes - LABS 5/11    4. Have you been hospitalized or seen in the emergency room since your last visit here?   no    5. Did the patient have a depression screening completed today?  Yes    PHQ-9 Total Score: 0 (6/9/2023 10:30 AM)       PHQ-9 Given to (if applicable):               PHQ-9 Score (if applicable):                     [] Positive     []  Negative              Does question #9 need addressed (if applicable)                     [] Yes    []  No               Dayna Holder CMA
Component Value Date     (H) 04/25/2023     No results found for: LD  Lab Results   Component Value Date    TSHHS 2.720 05/06/2019    T4FREE 1.20 05/06/2019    FT3 2.8 11/08/2016       Immunology:  Lab Results   Component Value Date    PROT 6.2 (L) 04/25/2023     No results found for: Giovanny Estrella, KLFLCR  No results found for: B2M    Coagulation Panel:  Lab Results   Component Value Date    PROTIME 20.2 (H) 10/04/2021    INR 1.76 10/04/2021    APTT 31.3 09/17/2020       Anemia Panel:  Lab Results   Component Value Date    QZHNQLYE55 1558 (H) 04/25/2023    FOLATE 2.5 (L) 04/25/2023       Tumor Markers:  No results found for: , CEA, , LABCA2, PSA      Imaging: Reviewed     Pathology:Reviewed     Observations:  Performance Status: ECOG 0  Depression Status: PHQ-9 Total Score: 0 (6/9/2023 10:30 AM)              Assessment & Plan:  PVera/Low ferritin: Low Erythropoetin and JAK2 + suggestive of polycythemia vera. Started Hydrea jan 2018, as H/O thrombosis(needing CABG) and also Possible TIA. leukocytosis in may 2019 was most probably sec to steroids, Flow at that point with left shift but otherwise normal.  Hydrea being adjusted according to the counts, currently taking 500 mg/day. May 2023 CBC WBC of 23.8 hemoglobin of 16.7 hematocrit 59.4 MCV of 80.6 and platelets of 089. LDH was elevated at 390 in April 2023. Ferritin 77, B12 1558. Recommended that she increases the Hydrea to 1000 mg daily. Recommend one-time phlebotomy. Continue folic acid. Continue iron every other day or 2 times a week. Adequate bowel regimen. Repeat CBC and other labs pending  Labs pending, titrate hydrea and iron according. Falls, fatigue, etiology unclear ?sec to hyperparathyroidism? ??.  Recommend that she follows with cardiology/primary care physician and also discussed evaluation in ER but she deferred. Parathyroid adenoma, s/p parathyroidectomy may 2023. No malignancy.  Looks like primary

## 2023-06-13 PROBLEM — R05.9 COUGH IN ADULT: Status: ACTIVE | Noted: 2023-06-13

## 2023-06-26 ENCOUNTER — HOSPITAL ENCOUNTER (OUTPATIENT)
Dept: GENERAL RADIOLOGY | Age: 73
Discharge: HOME | End: 2023-06-26
Attending: INTERNAL MEDICINE
Payer: MEDICARE

## 2023-06-26 ENCOUNTER — HOSPITAL ENCOUNTER (OUTPATIENT)
Dept: WOMENS IMAGING | Age: 73
Discharge: HOME | End: 2023-06-26
Attending: INTERNAL MEDICINE
Payer: MEDICARE

## 2023-06-26 ENCOUNTER — HOSPITAL ENCOUNTER (OUTPATIENT)
Dept: ULTRASOUND IMAGING | Age: 73
Discharge: HOME | End: 2023-06-26
Attending: INTERNAL MEDICINE
Payer: MEDICARE

## 2023-06-26 DIAGNOSIS — N20.0 CALCULUS OF KIDNEY: ICD-10-CM

## 2023-06-26 DIAGNOSIS — E21.3 HYPERPARATHYROIDISM, UNSPECIFIED (HCC): ICD-10-CM

## 2023-06-26 PROCEDURE — 76775 US EXAM ABDO BACK WALL LIM: CPT

## 2023-06-26 PROCEDURE — 74018 RADEX ABDOMEN 1 VIEW: CPT

## 2023-06-26 PROCEDURE — 77080 DXA BONE DENSITY AXIAL: CPT

## 2023-07-21 ENCOUNTER — TELEPHONE (OUTPATIENT)
Dept: PULMONOLOGY | Age: 73
End: 2023-07-21

## 2023-07-21 NOTE — TELEPHONE ENCOUNTER
Spoke to pt and gave her central schedulings phone number to schedule her PFT. I advised her to call our office if it was not scheduled before her follow up on 7/28.

## 2023-07-26 ENCOUNTER — HOSPITAL ENCOUNTER (OUTPATIENT)
Dept: NUCLEAR MEDICINE | Age: 73
Discharge: HOME | End: 2023-07-26
Payer: MEDICARE

## 2023-07-26 DIAGNOSIS — E21.3 HYPERPARATHYROIDISM, UNSPECIFIED (HCC): ICD-10-CM

## 2023-07-26 PROCEDURE — 3430000000 HC RX DIAGNOSTIC RADIOPHARMACEUTICAL: Performed by: UROLOGY

## 2023-07-26 PROCEDURE — 78072 PARATHYRD PLANAR W/SPECT&CT: CPT

## 2023-07-26 PROCEDURE — A9500 TC99M SESTAMIBI: HCPCS | Performed by: UROLOGY

## 2023-07-26 RX ORDER — TETRAKIS(2-METHOXYISOBUTYLISOCYANIDE)COPPER(I) TETRAFLUOROBORATE 1 MG/ML
24.7 INJECTION, POWDER, LYOPHILIZED, FOR SOLUTION INTRAVENOUS
Status: COMPLETED | OUTPATIENT
Start: 2023-07-26 | End: 2023-07-26

## 2023-07-26 RX ADMIN — KIT FOR THE PREPARATION OF TECHNETIUM TC99M SESTAMIBI 24.7 MILLICURIE: 1 INJECTION, POWDER, LYOPHILIZED, FOR SOLUTION PARENTERAL at 11:20

## 2023-07-28 ENCOUNTER — OFFICE VISIT (OUTPATIENT)
Dept: PULMONOLOGY | Age: 73
End: 2023-07-28
Payer: MEDICARE

## 2023-07-28 VITALS
BODY MASS INDEX: 23.92 KG/M2 | HEART RATE: 83 BPM | SYSTOLIC BLOOD PRESSURE: 118 MMHG | OXYGEN SATURATION: 96 % | DIASTOLIC BLOOD PRESSURE: 74 MMHG | HEIGHT: 63 IN | WEIGHT: 135 LBS

## 2023-07-28 DIAGNOSIS — R05.9 COUGH IN ADULT: Primary | ICD-10-CM

## 2023-07-28 PROCEDURE — 1123F ACP DISCUSS/DSCN MKR DOCD: CPT | Performed by: NURSE PRACTITIONER

## 2023-07-28 PROCEDURE — 99213 OFFICE O/P EST LOW 20 MIN: CPT | Performed by: NURSE PRACTITIONER

## 2023-07-31 ASSESSMENT — ENCOUNTER SYMPTOMS: COUGH: 1

## 2023-07-31 NOTE — PROGRESS NOTES
atorvastatin (LIPITOR) 40 MG tablet Take 1 tablet by mouth daily      levothyroxine (SYNTHROID) 25 MCG tablet Take 1 tablet by mouth Daily      Esomeprazole Magnesium (NEXIUM PO) Take 1 tablet by mouth nightly      carvedilol (COREG) 6.25 MG tablet Take 1 tablet by mouth 2 times daily (Patient taking differently: Take 1 tablet by mouth daily) 60 tablet 3    gabapentin (NEURONTIN) 400 MG capsule Take 1 capsule by mouth every other day. No current facility-administered medications on file prior to visit. Tobacco Use      Smoking status: Never      Smokeless tobacco: Never     Comparison  03/16/2023 07/26/2023  FINDINGS:  Immediate images: Activity is seen within bilateral thyroid lobes. Physiologic activity is  seen within salivary glands, heart, liver. Delayed images: There is incomplete washout of activity from bilateral thyroid lobes. The  intense nodular asymmetric activity at the inferior left thyroid bed on the  prior study is not seen on the current study. Physiologic activity is seen  within salivary glands, heart, liver. Status post median sternotomy. Calcification of the thoracic aorta. IMPRESSION:  The previously demonstrated activity at the inferior left thyroid bed has  resolved, compatible with interval resection of parathyroid adenoma.        03/16/2023  FINDINGS:  Immediate images: Activity is seen within bilateral thyroid lobes. Greatest activity is seen  inferiorly at the left thyroid bed. Physiologic activity is seen within  salivary glands and heart. Delayed images: There is incomplete washout of activity from the thyroid. Nodular asymmetric  retention of activity is seen at the inferior left thyroid bed. The activity  appears near the medial left clavicular head on CT. Status post median sternotomy. Calcification of the thoracic aorta. Patchy  bilateral ground-glass opacity, likely atelectasis.      IMPRESSION:  Findings are suspicious

## 2023-08-04 ENCOUNTER — HOSPITAL ENCOUNTER (OUTPATIENT)
Dept: INFUSION THERAPY | Age: 73
Discharge: HOME | End: 2023-08-04
Payer: MEDICARE

## 2023-08-04 DIAGNOSIS — E53.8 B12 DEFICIENCY: ICD-10-CM

## 2023-08-04 DIAGNOSIS — C44.92 SQUAMOUS CELL CARCINOMA OF SKIN, UNSPECIFIED: ICD-10-CM

## 2023-08-04 DIAGNOSIS — D45 POLYCYTHEMIA VERA (HCC): ICD-10-CM

## 2023-08-04 DIAGNOSIS — D50.9 IRON DEFICIENCY ANEMIA, UNSPECIFIED IRON DEFICIENCY ANEMIA TYPE: ICD-10-CM

## 2023-08-04 DIAGNOSIS — K90.9 INTESTINAL MALABSORPTION, UNSPECIFIED TYPE: ICD-10-CM

## 2023-08-04 DIAGNOSIS — R10.9 FLANK PAIN: ICD-10-CM

## 2023-08-04 LAB
ALBUMIN SERPL-MCNC: 4.1 GM/DL (ref 3.4–5)
ALP BLD-CCNC: 98 IU/L (ref 40–129)
ALT SERPL-CCNC: 12 U/L (ref 10–40)
ANION GAP SERPL CALCULATED.3IONS-SCNC: 9 MMOL/L (ref 4–16)
AST SERPL-CCNC: 12 IU/L (ref 15–37)
BASOPHILS ABSOLUTE: 0 K/CU MM
BASOPHILS RELATIVE PERCENT: 0.6 % (ref 0–1)
BILIRUB SERPL-MCNC: 0.7 MG/DL (ref 0–1)
BUN SERPL-MCNC: 10 MG/DL (ref 6–23)
CALCIUM SERPL-MCNC: 9.2 MG/DL (ref 8.3–10.6)
CHLORIDE BLD-SCNC: 103 MMOL/L (ref 99–110)
CO2: 27 MMOL/L (ref 21–32)
CREAT SERPL-MCNC: 0.8 MG/DL (ref 0.6–1.1)
DIFFERENTIAL TYPE: ABNORMAL
EOSINOPHILS ABSOLUTE: 0.1 K/CU MM
EOSINOPHILS RELATIVE PERCENT: 1.4 % (ref 0–3)
FERRITIN: 404 NG/ML (ref 15–150)
GFR SERPL CREATININE-BSD FRML MDRD: >60 ML/MIN/1.73M2
GLUCOSE SERPL-MCNC: 103 MG/DL (ref 70–99)
HCT VFR BLD CALC: 45.2 % (ref 37–47)
HEMOGLOBIN: 14.1 GM/DL (ref 12.5–16)
IRON: 131 UG/DL (ref 37–145)
LACTATE DEHYDROGENASE: 199 IU/L (ref 120–246)
LYMPHOCYTES ABSOLUTE: 1.7 K/CU MM
LYMPHOCYTES RELATIVE PERCENT: 27.4 % (ref 24–44)
MCH RBC QN AUTO: 30.6 PG (ref 27–31)
MCHC RBC AUTO-ENTMCNC: 31.2 % (ref 32–36)
MCV RBC AUTO: 98 FL (ref 78–100)
MONOCYTES ABSOLUTE: 0.2 K/CU MM
MONOCYTES RELATIVE PERCENT: 3.3 % (ref 0–4)
PCT TRANSFERRIN: 75 % (ref 10–44)
PDW BLD-RTO: 29.5 % (ref 11.7–14.9)
PLATELET # BLD: 244 K/CU MM (ref 140–440)
PMV BLD AUTO: 10 FL (ref 7.5–11.1)
POTASSIUM SERPL-SCNC: 4.2 MMOL/L (ref 3.5–5.1)
RBC # BLD: 4.61 M/CU MM (ref 4.2–5.4)
SEGMENTED NEUTROPHILS ABSOLUTE COUNT: 4.2 K/CU MM
SEGMENTED NEUTROPHILS RELATIVE PERCENT: 67.3 % (ref 36–66)
SODIUM BLD-SCNC: 139 MMOL/L (ref 135–145)
TOTAL IRON BINDING CAPACITY: 175 UG/DL (ref 250–450)
TOTAL PROTEIN: 6.1 GM/DL (ref 6.4–8.2)
UNSATURATED IRON BINDING CAPACITY: 44 UG/DL (ref 110–370)
WBC # BLD: 6.3 K/CU MM (ref 4–10.5)

## 2023-08-04 PROCEDURE — 85025 COMPLETE CBC W/AUTO DIFF WBC: CPT

## 2023-08-04 PROCEDURE — 82728 ASSAY OF FERRITIN: CPT

## 2023-08-04 PROCEDURE — 83615 LACTATE (LD) (LDH) ENZYME: CPT

## 2023-08-04 PROCEDURE — 36415 COLL VENOUS BLD VENIPUNCTURE: CPT

## 2023-08-04 PROCEDURE — 83540 ASSAY OF IRON: CPT

## 2023-08-04 PROCEDURE — 83550 IRON BINDING TEST: CPT

## 2023-08-04 PROCEDURE — 80053 COMPREHEN METABOLIC PANEL: CPT

## 2023-08-10 NOTE — PROGRESS NOTES
Patient Name: Kellie Eldridge  Patient : 1950  Patient MRN: [id number]     Primary Oncologist: Lenard Gibbs MD  PCP: Dr Avila Rice        Date of Service: 2023      Chief Complaint:   Chief Complaint   Patient presents with    Follow-up        Active Problem list  1. Polycythemia vera ([address])    2. Squamous cell carcinoma of skin, unspecified    3. B12 deficiency    4. Iron deficiency anemia, unspecified iron deficiency anemia type    5. Intestinal malabsorption, unspecified type           HPI:        Referred in 2016 for polycythemia, CBC with wbc 13.6, Hb 14.4 hct 47.8, plt 513K  16 Jak2 postive  Was started on Hydrea as h/o MI needing CABG and also h/o TIA x 2, dose adjusted as needed. Then with SHAILA, had EGD, Cscope and VCE in may 2016  Cscope with diverticulosis and nonbleeding internal hemorrhoids  EGD with erythematous stomach but biopsy neg for h.pylori or malignancy  Capsule endoscopy with apthous ulcers in the small bowel. 18: Hydrea 1000mg OD    2018: Ct abdomen and pelvis:Impression  No renal or ureteral stone. No bladder stone. Distal left ureter does not completely opacify but otherwise appears  unremarkable. Otherwise no filling defect within the renal or ureteral  collecting systems. Mild irregularity along the posterior bladder wall which may be related to  ureterovesical junction bilaterally. Recommend further evaluation with  cystoscopy given hematuria.       19: CBC with wbc 11, Hb 20.2, hct 60, mcv 105, plt 171K  Creatinine 1.06, alk [id number]  ldh 296    3/12/19:CBC with wbc 11.9, Hb 15.6, hct 49.2, mcv 106, plt 384K  Ferritin 9  sats 7    2020 LDH of 149 CMP with sodium 138 potassium 4.6 glucose of 37 creatinine 1 calcium 10.3 ALT 8 AST 10 alk phos 115 CBC with WBC 11.7 hemoglobin 13.1 hematocrit 47.7 MCV of 101.7 platelets of 122      2020 EGD with Possible barrettes but biopsy negative     Had phlebotomy  and 2021 ASA and hydrea

## 2023-08-11 ENCOUNTER — OFFICE VISIT (OUTPATIENT)
Dept: ONCOLOGY | Age: 73
End: 2023-08-11
Payer: MEDICARE

## 2023-08-11 ENCOUNTER — HOSPITAL ENCOUNTER (OUTPATIENT)
Dept: INFUSION THERAPY | Age: 73
Discharge: HOME | End: 2023-08-11
Payer: MEDICARE

## 2023-08-11 VITALS
DIASTOLIC BLOOD PRESSURE: 69 MMHG | HEART RATE: 75 BPM | HEIGHT: 63 IN | OXYGEN SATURATION: 95 % | RESPIRATION RATE: 16 BRPM | SYSTOLIC BLOOD PRESSURE: 108 MMHG | WEIGHT: 135.4 LBS | TEMPERATURE: 96.9 F | BODY MASS INDEX: 23.99 KG/M2

## 2023-08-11 DIAGNOSIS — Z12.31 SCREENING MAMMOGRAM FOR BREAST CANCER: ICD-10-CM

## 2023-08-11 DIAGNOSIS — D45 POLYCYTHEMIA VERA (HCC): Primary | ICD-10-CM

## 2023-08-11 DIAGNOSIS — E53.8 B12 DEFICIENCY: ICD-10-CM

## 2023-08-11 DIAGNOSIS — D50.9 IRON DEFICIENCY ANEMIA, UNSPECIFIED IRON DEFICIENCY ANEMIA TYPE: ICD-10-CM

## 2023-08-11 DIAGNOSIS — R63.4 WEIGHT LOSS: ICD-10-CM

## 2023-08-11 DIAGNOSIS — C44.92 SQUAMOUS CELL CARCINOMA OF SKIN, UNSPECIFIED: ICD-10-CM

## 2023-08-11 DIAGNOSIS — K90.9 INTESTINAL MALABSORPTION, UNSPECIFIED TYPE: ICD-10-CM

## 2023-08-11 PROCEDURE — 1123F ACP DISCUSS/DSCN MKR DOCD: CPT | Performed by: INTERNAL MEDICINE

## 2023-08-11 PROCEDURE — 99214 OFFICE O/P EST MOD 30 MIN: CPT | Performed by: INTERNAL MEDICINE

## 2023-08-11 PROCEDURE — 99211 OFF/OP EST MAY X REQ PHY/QHP: CPT

## 2023-08-11 NOTE — PROGRESS NOTES
MA Rooming Questions  Patient: Lord Dixon  MRN: [id number]    Date: 8/11/2023        1. Do you have any new issues?   no         2. Do you need any refills on medications?    no    3. Have you had any imaging done since your last visit? yes - [facility]    4. Have you been hospitalized or seen in the emergency room since your last visit here?   yes - PT HAD AN EGD DONE OUT PT ON 8/8 @ [facility]    5. Did the patient have a depression screening completed today?  No    No data recorded     PHQ-9 Given to (if applicable):               PHQ-9 Score (if applicable):                     [] Positive     []  Negative              Does question #9 need addressed (if applicable)                     [] Yes    []  No               Abigail Wright MA

## 2023-08-17 ENCOUNTER — APPOINTMENT (OUTPATIENT)
Dept: CT IMAGING | Age: 73
End: 2023-08-17
Attending: INTERNAL MEDICINE
Payer: MEDICARE

## 2023-08-17 ENCOUNTER — HOSPITAL ENCOUNTER (OUTPATIENT)
Dept: CT IMAGING | Age: 73
Discharge: HOME | End: 2023-08-17
Attending: INTERNAL MEDICINE
Payer: MEDICARE

## 2023-08-17 DIAGNOSIS — R63.4 WEIGHT LOSS: ICD-10-CM

## 2023-08-17 PROCEDURE — 74177 CT ABD & PELVIS W/CONTRAST: CPT

## 2023-08-17 PROCEDURE — 6360000004 HC RX CONTRAST MEDICATION: Performed by: INTERNAL MEDICINE

## 2023-08-17 PROCEDURE — 71260 CT THORAX DX C+: CPT

## 2023-08-17 PROCEDURE — 2580000003 HC RX 258: Performed by: INTERNAL MEDICINE

## 2023-08-17 RX ORDER — SODIUM CHLORIDE 0.9 % (FLUSH) 0.9 %
20 SYRINGE (ML) INJECTION
Status: COMPLETED | OUTPATIENT
Start: 2023-08-17 | End: 2023-08-17

## 2023-08-17 RX ADMIN — SODIUM CHLORIDE, PRESERVATIVE FREE 20 ML: 5 INJECTION INTRAVENOUS at 16:48

## 2023-08-17 RX ADMIN — IOPAMIDOL 75 ML: 755 INJECTION, SOLUTION INTRAVENOUS at 16:47

## 2023-08-22 ENCOUNTER — HOSPITAL ENCOUNTER (OUTPATIENT)
Dept: PULMONOLOGY | Age: 73
Discharge: HOME | End: 2023-08-22
Payer: MEDICARE

## 2023-08-22 DIAGNOSIS — R05.9 COUGH IN ADULT: ICD-10-CM

## 2023-08-22 LAB
DLCO %PRED: 72 %
DLCO PRED: NORMAL
DLCO/VA %PRED: NORMAL
DLCO/VA PRED: NORMAL
DLCO/VA: NORMAL
DLCO: NORMAL
EXPIRATORY TIME-POST: NORMAL
EXPIRATORY TIME: NORMAL
FEF 25-75% %CHNG: NORMAL
FEF 25-75% %PRED-POST: NORMAL
FEF 25-75% %PRED-PRE: NORMAL
FEF 25-75% PRED: NORMAL
FEF 25-75%-POST: NORMAL
FEF 25-75%-PRE: NORMAL
FEV1 %PRED-POST: 126 %
FEV1 %PRED-PRE: 122 %
FEV1 PRED: NORMAL
FEV1-POST: NORMAL
FEV1-PRE: NORMAL
FEV1/FVC %PRED-POST: NORMAL
FEV1/FVC %PRED-PRE: NORMAL
FEV1/FVC PRED: NORMAL
FEV1/FVC-POST: 106 %
FEV1/FVC-PRE: 104 %
FVC %PRED-POST: 118 L
FVC %PRED-PRE: 116 %
FVC PRED: NORMAL
FVC-POST: NORMAL
FVC-PRE: NORMAL
GAW %PRED: NORMAL
GAW PRED: NORMAL
GAW: NORMAL
IC %PRED: NORMAL
IC PRED: NORMAL
IC: NORMAL
MEP: NORMAL
MIP: NORMAL
MVV %PRED-PRE: NORMAL
MVV PRED: NORMAL
MVV-PRE: NORMAL
PEF %PRED-POST: NORMAL
PEF %PRED-PRE: NORMAL
PEF PRED: NORMAL
PEF%CHNG: NORMAL
PEF-POST: NORMAL
PEF-PRE: NORMAL
RAW %PRED: NORMAL
RAW PRED: NORMAL
RAW: NORMAL
RV %PRED: NORMAL
RV PRED: NORMAL
RV: NORMAL
SVC %PRED: NORMAL
SVC PRED: NORMAL
SVC: NORMAL
TLC %PRED: 92 %
TLC PRED: NORMAL
TLC: NORMAL
VA %PRED: NORMAL
VA PRED: NORMAL
VA: NORMAL
VTG %PRED: NORMAL
VTG PRED: NORMAL
VTG: NORMAL

## 2023-08-22 PROCEDURE — 94060 EVALUATION OF WHEEZING: CPT

## 2023-08-22 PROCEDURE — 94729 DIFFUSING CAPACITY: CPT

## 2023-08-22 PROCEDURE — 94726 PLETHYSMOGRAPHY LUNG VOLUMES: CPT

## 2023-08-22 ASSESSMENT — PULMONARY FUNCTION TESTS
FVC_PERCENT_PREDICTED_PRE: 116
FEV1/FVC_POST: 106
FEV1/FVC_PRE: 104
FEV1_PERCENT_PREDICTED_POST: 126
FEV1_PERCENT_PREDICTED_PRE: 122
FVC_PERCENT_PREDICTED_POST: 118

## 2023-09-06 ENCOUNTER — OFFICE VISIT (OUTPATIENT)
Dept: PULMONOLOGY | Age: 73
End: 2023-09-06
Payer: MEDICARE

## 2023-09-06 VITALS
WEIGHT: 135 LBS | DIASTOLIC BLOOD PRESSURE: 62 MMHG | HEART RATE: 76 BPM | BODY MASS INDEX: 23.92 KG/M2 | OXYGEN SATURATION: 94 % | HEIGHT: 63 IN | SYSTOLIC BLOOD PRESSURE: 118 MMHG

## 2023-09-06 DIAGNOSIS — R05.9 COUGH IN ADULT: Primary | ICD-10-CM

## 2023-09-06 PROCEDURE — 99213 OFFICE O/P EST LOW 20 MIN: CPT | Performed by: NURSE PRACTITIONER

## 2023-09-06 PROCEDURE — 1123F ACP DISCUSS/DSCN MKR DOCD: CPT | Performed by: NURSE PRACTITIONER

## 2023-09-06 ASSESSMENT — ENCOUNTER SYMPTOMS: COUGH: 1

## 2023-09-06 NOTE — PROGRESS NOTES
Araceli Valencia (:  1950) is a 75 y.o. female,Established patient, here for evaluation of the following chief complaint(s):  Results (Discuss PFT Results )        Subjective   SUBJECTIVE/OBJECTIVE:  Karon Signs 74 yo female is here for PFT results. Shanti Sanchez reports coughing fits have greatly subsided. She still coughs a dry cough infrequently, dry, nonproductive. CT of the lung was performed 2023 and found no acute cardiopulmonary abnormality. No suspicious pulmonary nodule. (See full report below). PFT  indicates minimal diffusion evident -- Pulmonary Vascular defect by DLCO 72  FVC, FEV1, FEV1/FVC, FEF 25- 75% are all within normal limits. Airway resistance is normal. Lung Volumes are within normal limits. Shanti Sanchez denies chills, fever, n/v. She has had no recent ED visits or hospital stays for respiratory infection. She follows GI for GERD, mild sliding hiatal hernia. She follows Dr. Suha Murray for polycythemia vera, squamous cell carcinoma of skin, iron deficiency, intestinal malabsorption. She reports being worked up for Lupus, but it has not been determined just yet. She reports having her esophagus stretched. No abnormalities found related to cough. She takes Nexium for relief of acid reflux. Review of Systems   Respiratory:  Positive for cough. All other systems reviewed and are negative. Objective   Physical Exam  Vitals and nursing note reviewed. HENT:      Nose: Nose normal.      Mouth/Throat:      Mouth: Mucous membranes are moist.      Pharynx: Oropharynx is clear. Eyes:      Extraocular Movements: Extraocular movements intact. Conjunctiva/sclera: Conjunctivae normal.      Pupils: Pupils are equal, round, and reactive to light. Cardiovascular:      Rate and Rhythm: Normal rate and regular rhythm. Pulses: Normal pulses. Heart sounds: Normal heart sounds. Pulmonary:      Effort: Pulmonary effort is normal.      Breath sounds: Normal breath sounds.

## 2023-09-12 ENCOUNTER — APPOINTMENT (OUTPATIENT)
Dept: GENERAL RADIOLOGY | Age: 73
End: 2023-09-12
Payer: MEDICARE

## 2023-09-12 ENCOUNTER — HOSPITAL ENCOUNTER (EMERGENCY)
Age: 73
Discharge: HOME | End: 2023-09-12
Attending: EMERGENCY MEDICINE
Payer: MEDICARE

## 2023-09-12 VITALS
HEART RATE: 71 BPM | HEIGHT: 63 IN | OXYGEN SATURATION: 95 % | RESPIRATION RATE: 14 BRPM | BODY MASS INDEX: 23.92 KG/M2 | WEIGHT: 135 LBS | TEMPERATURE: 97.8 F | DIASTOLIC BLOOD PRESSURE: 65 MMHG | SYSTOLIC BLOOD PRESSURE: 147 MMHG

## 2023-09-12 DIAGNOSIS — M25.473 ANKLE SWELLING, UNSPECIFIED LATERALITY: Primary | ICD-10-CM

## 2023-09-12 DIAGNOSIS — S93.401A SPRAIN OF RIGHT ANKLE, UNSPECIFIED LIGAMENT, INITIAL ENCOUNTER: ICD-10-CM

## 2023-09-12 LAB — COMMENT: NORMAL

## 2023-09-12 PROCEDURE — 6370000000 HC RX 637 (ALT 250 FOR IP): Performed by: EMERGENCY MEDICINE

## 2023-09-12 PROCEDURE — 73630 X-RAY EXAM OF FOOT: CPT

## 2023-09-12 PROCEDURE — 73610 X-RAY EXAM OF ANKLE: CPT

## 2023-09-12 PROCEDURE — 99283 EMERGENCY DEPT VISIT LOW MDM: CPT

## 2023-09-12 RX ORDER — NAPROXEN 250 MG/1
500 TABLET ORAL ONCE
Status: COMPLETED | OUTPATIENT
Start: 2023-09-12 | End: 2023-09-12

## 2023-09-12 RX ORDER — NAPROXEN 500 MG/1
500 TABLET ORAL 2 TIMES DAILY
Qty: 60 TABLET | Refills: 0 | Status: SHIPPED | OUTPATIENT
Start: 2023-09-12

## 2023-09-12 RX ORDER — ACETAMINOPHEN 500 MG
1000 TABLET ORAL ONCE
Status: COMPLETED | OUTPATIENT
Start: 2023-09-12 | End: 2023-09-12

## 2023-09-12 RX ORDER — ACETAMINOPHEN 325 MG/1
650 TABLET ORAL EVERY 6 HOURS PRN
Qty: 120 TABLET | Refills: 3 | Status: SHIPPED | OUTPATIENT
Start: 2023-09-12

## 2023-09-12 RX ADMIN — NAPROXEN 500 MG: 250 TABLET ORAL at 09:34

## 2023-09-12 RX ADMIN — ACETAMINOPHEN 1000 MG: 500 TABLET ORAL at 09:34

## 2023-09-12 ASSESSMENT — ENCOUNTER SYMPTOMS
RESPIRATORY NEGATIVE: 1
ALLERGIC/IMMUNOLOGIC NEGATIVE: 1
GASTROINTESTINAL NEGATIVE: 1
EYES NEGATIVE: 1

## 2023-09-12 ASSESSMENT — PAIN DESCRIPTION - ORIENTATION: ORIENTATION: RIGHT

## 2023-09-12 ASSESSMENT — PAIN - FUNCTIONAL ASSESSMENT
PAIN_FUNCTIONAL_ASSESSMENT: ACTIVITIES ARE NOT PREVENTED
PAIN_FUNCTIONAL_ASSESSMENT: 0-10

## 2023-09-12 ASSESSMENT — PAIN DESCRIPTION - PAIN TYPE: TYPE: ACUTE PAIN

## 2023-09-12 ASSESSMENT — PAIN DESCRIPTION - FREQUENCY: FREQUENCY: INTERMITTENT

## 2023-09-12 ASSESSMENT — PAIN DESCRIPTION - DESCRIPTORS: DESCRIPTORS: ACHING

## 2023-09-12 ASSESSMENT — PAIN DESCRIPTION - LOCATION: LOCATION: FOOT

## 2023-09-12 ASSESSMENT — PAIN SCALES - GENERAL: PAINLEVEL_OUTOF10: 8

## 2023-09-12 NOTE — ED PROVIDER NOTES
[address] [facility] [place]      TRIAGE CHIEF COMPLAINT:   Foot Pain (REPORTS OF RIGHT FOOT PAIN AND SWELLING SINCE FRIDAY)      [place]:  Kellie Eldridge is a 75 y.o. female that presents with complaint of right foot, ankle pain. Patient states that comes and goes she is never seen a doctor about it before she states that the last couple days she has had right ankle pain and swelling she has a history of chronic right foot pain after surgery. She did not take any medicine for pain. She thinks she may have sprained it while walking her dog she does walk with a cane in her outside is sammy she states. She denies any fevers nausea vomiting chest pain shortness of breath weakness numbness or tingling no other injury or trauma. She is on Eliquis. No color change no history of gout again no other questions but does not take medicine for it. Sloane Packer REVIEW OF SYSTEMS:  At least 10 systems reviewed and otherwise acutely negative except as in the [address] Review of Systems   Constitutional: Negative. HENT: Negative. Eyes: Negative. Respiratory: Negative. Cardiovascular: Negative. Gastrointestinal: Negative. Endocrine: Negative. Genitourinary: Negative. Musculoskeletal:  Positive for arthralgias, joint swelling and myalgias. Skin: Negative. Allergic/Immunologic: Negative. Neurological: Negative. Hematological: Negative. Psychiatric/Behavioral: Negative. All other systems reviewed and are negative.       Past Medical History:   Diagnosis Date    Cancer [facility])     Coronary artery disease involving native coronary artery of native heart with angina pectoris ([address] [facility]) 10/22/2015    Depression     GERD (gastroesophageal reflux disease)     Hyperlipidemia     Insomnia     Polycythemia     Thyroid disease     TIA (transient ischemic attack)      Past Surgical History:   Procedure Laterality Date    BACK SURGERY      2 surgeries    CARDIAC SURGERY      quad    CHOLECYSTECTOMY

## 2023-09-21 ENCOUNTER — HOSPITAL ENCOUNTER (OUTPATIENT)
Dept: INFUSION THERAPY | Age: 73
Discharge: HOME | End: 2023-09-21
Payer: MEDICARE

## 2023-09-21 ENCOUNTER — TELEPHONE (OUTPATIENT)
Dept: ONCOLOGY | Age: 73
End: 2023-09-21

## 2023-09-21 ENCOUNTER — CLINICAL DOCUMENTATION (OUTPATIENT)
Dept: ONCOLOGY | Age: 73
End: 2023-09-21

## 2023-09-21 ENCOUNTER — HOSPITAL ENCOUNTER (OUTPATIENT)
Age: 73
Discharge: HOME | End: 2023-09-21
Payer: MEDICARE

## 2023-09-21 DIAGNOSIS — D45 POLYCYTHEMIA VERA (HCC): ICD-10-CM

## 2023-09-21 LAB
ALBUMIN SERPL-MCNC: 4.2 GM/DL (ref 3.4–5)
ALBUMIN SERPL-MCNC: 4.2 GM/DL (ref 3.4–5)
ALP BLD-CCNC: 82 IU/L (ref 40–128)
ALP BLD-CCNC: 82 IU/L (ref 40–129)
ALT SERPL-CCNC: 6 U/L (ref 10–40)
ALT SERPL-CCNC: 6 U/L (ref 10–40)
ANION GAP SERPL CALCULATED.3IONS-SCNC: 10 MMOL/L (ref 4–16)
ANION GAP SERPL CALCULATED.3IONS-SCNC: 8 MMOL/L (ref 4–16)
AST SERPL-CCNC: 9 IU/L (ref 15–37)
AST SERPL-CCNC: 9 IU/L (ref 15–37)
BACTERIA: NEGATIVE /HPF
BASOPHILS ABSOLUTE: 0 K/CU MM
BASOPHILS RELATIVE PERCENT: 0.6 % (ref 0–1)
BILIRUB SERPL-MCNC: 0.8 MG/DL (ref 0–1)
BILIRUB SERPL-MCNC: 0.8 MG/DL (ref 0–1)
BILIRUBIN URINE: NEGATIVE MG/DL
BLOOD, URINE: NEGATIVE
BUN SERPL-MCNC: 10 MG/DL (ref 6–23)
BUN SERPL-MCNC: 9 MG/DL (ref 6–23)
CALCIUM SERPL-MCNC: 9 MG/DL (ref 8.3–10.6)
CALCIUM SERPL-MCNC: 9.2 MG/DL (ref 8.3–10.6)
CHLORIDE BLD-SCNC: 105 MMOL/L (ref 99–110)
CHLORIDE BLD-SCNC: 107 MMOL/L (ref 99–110)
CLARITY: CLEAR
CO2: 25 MMOL/L (ref 21–32)
CO2: 28 MMOL/L (ref 21–32)
COLOR: YELLOW
CREAT SERPL-MCNC: 0.8 MG/DL (ref 0.6–1.1)
CREAT SERPL-MCNC: 0.8 MG/DL (ref 0.6–1.1)
CREATININE URINE: 222.3 MG/DL (ref 28–217)
DIFFERENTIAL TYPE: ABNORMAL
EOSINOPHILS ABSOLUTE: 0.1 K/CU MM
EOSINOPHILS RELATIVE PERCENT: 1.9 % (ref 0–3)
FERRITIN: 296 NG/ML (ref 15–150)
GFR SERPL CREATININE-BSD FRML MDRD: >60 ML/MIN/1.73M2
GFR SERPL CREATININE-BSD FRML MDRD: >60 ML/MIN/1.73M2
GLUCOSE SERPL-MCNC: 101 MG/DL (ref 70–99)
GLUCOSE SERPL-MCNC: 97 MG/DL (ref 70–99)
GLUCOSE, URINE: NEGATIVE MG/DL
HCT VFR BLD CALC: 36.7 % (ref 37–47)
HEMOGLOBIN: 12 GM/DL (ref 12.5–16)
IMMATURE NEUTROPHIL %: 0.8 % (ref 0–0.43)
IRON: 172 UG/DL (ref 37–145)
KETONES, URINE: ABNORMAL MG/DL
LEUKOCYTE ESTERASE, URINE: ABNORMAL
LYMPHOCYTES ABSOLUTE: 1.3 K/CU MM
LYMPHOCYTES RELATIVE PERCENT: 25.2 % (ref 24–44)
MAGNESIUM: 1.8 MG/DL (ref 1.8–2.4)
MCH RBC QN AUTO: 38.7 PG (ref 27–31)
MCHC RBC AUTO-ENTMCNC: 32.7 % (ref 32–36)
MCV RBC AUTO: 118.4 FL (ref 78–100)
MONOCYTES ABSOLUTE: 0.2 K/CU MM
MONOCYTES RELATIVE PERCENT: 3.1 % (ref 0–4)
MUCUS: ABNORMAL HPF
NITRITE URINE, QUANTITATIVE: NEGATIVE
NON SQUAM EPI CELLS: 1 /HPF
PCT TRANSFERRIN: 91 % (ref 10–44)
PDW BLD-RTO: 19 % (ref 11.7–14.9)
PH, URINE: 6 (ref 5–8)
PHOSPHORUS: 3.3 MG/DL (ref 2.5–4.9)
PLATELET # BLD: 378 K/CU MM (ref 140–440)
PMV BLD AUTO: 9.5 FL (ref 7.5–11.1)
POTASSIUM SERPL-SCNC: 4.1 MMOL/L (ref 3.5–5.1)
POTASSIUM SERPL-SCNC: 4.4 MMOL/L (ref 3.5–5.1)
PROT/CREAT RATIO, UR: 0.2
PROTEIN UA: ABNORMAL MG/DL
RBC # BLD: 3.1 M/CU MM (ref 4.2–5.4)
RBC URINE: 1 /HPF (ref 0–6)
REASON FOR REJECTION: NORMAL
REJECTED TEST: NORMAL
SEGMENTED NEUTROPHILS ABSOLUTE COUNT: 3.6 K/CU MM
SEGMENTED NEUTROPHILS RELATIVE PERCENT: 68.4 % (ref 36–66)
SODIUM BLD-SCNC: 141 MMOL/L (ref 135–145)
SODIUM BLD-SCNC: 142 MMOL/L (ref 135–145)
SPECIFIC GRAVITY UA: 1.02 (ref 1–1.03)
SQUAMOUS EPITHELIAL: <1 /HPF
TOTAL IMMATURE NEUTOROPHIL: 0.04 K/CU MM
TOTAL IRON BINDING CAPACITY: 190 UG/DL (ref 250–450)
TOTAL PROTEIN: 6.3 GM/DL (ref 6.4–8.2)
TOTAL PROTEIN: 6.4 GM/DL (ref 6.4–8.2)
TRICHOMONAS: ABNORMAL /HPF
UNSATURATED IRON BINDING CAPACITY: 18 UG/DL (ref 110–370)
URINE TOTAL PROTEIN: 39.8 MG/DL
UROBILINOGEN, URINE: 1 MG/DL (ref 0.2–1)
WBC # BLD: 5.2 K/CU MM (ref 4–10.5)
WBC UA: 8 /HPF (ref 0–5)

## 2023-09-21 PROCEDURE — 82728 ASSAY OF FERRITIN: CPT

## 2023-09-21 PROCEDURE — 80053 COMPREHEN METABOLIC PANEL: CPT

## 2023-09-21 PROCEDURE — 83735 ASSAY OF MAGNESIUM: CPT

## 2023-09-21 PROCEDURE — 83540 ASSAY OF IRON: CPT

## 2023-09-21 PROCEDURE — 36415 COLL VENOUS BLD VENIPUNCTURE: CPT

## 2023-09-21 PROCEDURE — 85025 COMPLETE CBC W/AUTO DIFF WBC: CPT

## 2023-09-21 PROCEDURE — 83970 ASSAY OF PARATHORMONE: CPT

## 2023-09-21 PROCEDURE — 84100 ASSAY OF PHOSPHORUS: CPT

## 2023-09-21 PROCEDURE — 83550 IRON BINDING TEST: CPT

## 2023-09-21 PROCEDURE — 81001 URINALYSIS AUTO W/SCOPE: CPT

## 2023-09-21 PROCEDURE — 82570 ASSAY OF URINE CREATININE: CPT

## 2023-09-21 PROCEDURE — 84156 ASSAY OF PROTEIN URINE: CPT

## 2023-09-21 NOTE — PROGRESS NOTES
Hydrea Results:    Current dose 1000 mg   New dose Alt 500 & 1000 mg     Lab Results   Component Value Date    WBC 5.2 09/21/2023    WBC 6.3 08/04/2023    HGB 12.0 (L) 09/21/2023    HGB 14.1 08/04/2023     09/21/2023     08/04/2023        Recheck CBC in 4 weeks    Patient verbally understood

## 2023-09-23 LAB — PTH-INTACT SERPL-MCNC: 83 PG/ML (ref 15–65)

## 2023-09-28 ENCOUNTER — HOSPITAL ENCOUNTER (OUTPATIENT)
Dept: INFUSION THERAPY | Age: 73
Discharge: HOME | End: 2023-09-28
Payer: MEDICARE

## 2023-09-28 ENCOUNTER — OFFICE VISIT (OUTPATIENT)
Dept: ONCOLOGY | Age: 73
End: 2023-09-28
Payer: MEDICARE

## 2023-09-28 VITALS
HEIGHT: 63 IN | HEART RATE: 85 BPM | WEIGHT: 139.6 LBS | OXYGEN SATURATION: 91 % | TEMPERATURE: 97.3 F | BODY MASS INDEX: 24.73 KG/M2 | DIASTOLIC BLOOD PRESSURE: 77 MMHG | SYSTOLIC BLOOD PRESSURE: 161 MMHG

## 2023-09-28 DIAGNOSIS — C44.92 SQUAMOUS CELL CARCINOMA OF SKIN, UNSPECIFIED: ICD-10-CM

## 2023-09-28 DIAGNOSIS — E53.8 B12 DEFICIENCY: ICD-10-CM

## 2023-09-28 DIAGNOSIS — D45 POLYCYTHEMIA VERA (HCC): Primary | ICD-10-CM

## 2023-09-28 PROCEDURE — 1123F ACP DISCUSS/DSCN MKR DOCD: CPT | Performed by: INTERNAL MEDICINE

## 2023-09-28 PROCEDURE — 99214 OFFICE O/P EST MOD 30 MIN: CPT | Performed by: INTERNAL MEDICINE

## 2023-09-28 PROCEDURE — 99211 OFF/OP EST MAY X REQ PHY/QHP: CPT

## 2023-09-28 RX ORDER — HYDROXYUREA 500 MG/1
1000 CAPSULE ORAL DAILY
Qty: 60 CAPSULE | Refills: 5 | Status: SHIPPED | OUTPATIENT
Start: 2023-09-28 | End: 2024-03-26

## 2023-09-28 NOTE — PROGRESS NOTES
MA Rooming Questions  Patient: Aurora Herbert  MRN: [id number]    Date: 9/28/2023        1. Do you have any new issues?   no         2. Do you need any refills on medications? yes - Hydrea    3. Have you had any imaging done since your last visit? yes - CT Scans     4. Have you been hospitalized or seen in the emergency room since your last visit here?   yes - ER 09-12    5. Did the patient have a depression screening completed today?  No    No data recorded     PHQ-9 Given to (if applicable):               PHQ-9 Score (if applicable):                     [] Positive     []  Negative              Does question #9 need addressed (if applicable)                     [] Yes    []  No               Jian Arevalo MA
today    Bilateral flank pain, renal calculi, follows with urology    TIA: Is on xarelto. Increased alk Po4: Stable, continue to monitor    Skin cancer/skin rash followed by dermatology    Easy bruising: PT/PTT normal, vW panel normal     Elevated ELLA: has been referred to rheumatology    Unintentional weight loss, CT scan of the chest abdomen pelvis as above mentioned. Has gained 4 lbs since last visit    Screening: Mammogram ordered. No further pap smears. Discussed the above findings and plan with the pt. She verbalizes understanding. Answered all questions. Discussed healthy lifestyle including exercise and weight loss. Recommend follow up with PCP and other specialists. Please do not hesitate to call if you need any further information. RTC jan 2024  or earlier if new Sx. This note is created with the assistance of a speech-recognition program. While intending to generate a document that accurately reflects the content of the visit, no guarantee can be provided that every mistake has been identified and corrected by editing. Portions of this note are copied forward from previous clinic note. Interval history, ROS, physical exam, assessment and plan has been reviewed and updated for accuracy by this provider. Time Spent with patient for face to face, exam, education, discussing treatment options, reviewing imaging, reviewing labs, decision making, Pre-charting, and documenting today's visit, >30 mins.      Jesus

## 2023-10-24 ENCOUNTER — HOSPITAL ENCOUNTER (OUTPATIENT)
Age: 73
Discharge: HOME | End: 2023-10-24
Payer: MEDICARE

## 2023-10-24 LAB
T4 FREE SERPL-MCNC: 1.29 NG/DL (ref 0.9–1.8)
TSH SERPL DL<=0.005 MIU/L-ACNC: 0.14 UIU/ML (ref 0.27–4.2)

## 2023-10-24 PROCEDURE — 84443 ASSAY THYROID STIM HORMONE: CPT

## 2023-10-24 PROCEDURE — 84439 ASSAY OF FREE THYROXINE: CPT

## 2023-10-30 ENCOUNTER — HOSPITAL ENCOUNTER (OUTPATIENT)
Dept: INFUSION THERAPY | Age: 73
Discharge: HOME | End: 2023-10-30
Payer: MEDICARE

## 2023-10-30 DIAGNOSIS — D45 POLYCYTHEMIA VERA (HCC): ICD-10-CM

## 2023-10-30 DIAGNOSIS — E53.8 B12 DEFICIENCY: ICD-10-CM

## 2023-10-30 DIAGNOSIS — C44.92 SQUAMOUS CELL CARCINOMA OF SKIN, UNSPECIFIED: ICD-10-CM

## 2023-10-30 LAB
ALBUMIN SERPL-MCNC: 3.8 GM/DL (ref 3.4–5)
ALP BLD-CCNC: 79 IU/L (ref 40–129)
ALT SERPL-CCNC: 6 U/L (ref 10–40)
ANION GAP SERPL CALCULATED.3IONS-SCNC: 8 MMOL/L (ref 4–16)
AST SERPL-CCNC: 11 IU/L (ref 15–37)
BASOPHILS ABSOLUTE: 0 K/CU MM
BASOPHILS RELATIVE PERCENT: 0.7 % (ref 0–1)
BILIRUB SERPL-MCNC: 0.5 MG/DL (ref 0–1)
BUN SERPL-MCNC: 9 MG/DL (ref 6–23)
CALCIUM SERPL-MCNC: 8.5 MG/DL (ref 8.3–10.6)
CHLORIDE BLD-SCNC: 103 MMOL/L (ref 99–110)
CO2: 29 MMOL/L (ref 21–32)
CREAT SERPL-MCNC: 0.8 MG/DL (ref 0.6–1.1)
DIFFERENTIAL TYPE: ABNORMAL
EOSINOPHILS ABSOLUTE: 0.1 K/CU MM
EOSINOPHILS RELATIVE PERCENT: 2.9 % (ref 0–3)
FERRITIN: 264 NG/ML (ref 15–150)
FOLATE SERPL-MCNC: 2.7 NG/ML (ref 3.1–17.5)
GFR SERPL CREATININE-BSD FRML MDRD: >60 ML/MIN/1.73M2
GLUCOSE SERPL-MCNC: 127 MG/DL (ref 70–99)
HCT VFR BLD CALC: 43.5 % (ref 37–47)
HEMOGLOBIN: 13.8 GM/DL (ref 12.5–16)
IRON: 65 UG/DL (ref 37–145)
LYMPHOCYTES ABSOLUTE: 1.4 K/CU MM
LYMPHOCYTES RELATIVE PERCENT: 33.6 % (ref 24–44)
MCH RBC QN AUTO: 38.4 PG (ref 27–31)
MCHC RBC AUTO-ENTMCNC: 31.7 % (ref 32–36)
MCV RBC AUTO: 121.2 FL (ref 78–100)
MONOCYTES ABSOLUTE: 0.1 K/CU MM
MONOCYTES RELATIVE PERCENT: 2.7 % (ref 0–4)
PCT TRANSFERRIN: 34 % (ref 10–44)
PDW BLD-RTO: 12.7 % (ref 11.7–14.9)
PLATELET # BLD: 232 K/CU MM (ref 140–440)
PMV BLD AUTO: 9.8 FL (ref 7.5–11.1)
POTASSIUM SERPL-SCNC: 4.1 MMOL/L (ref 3.5–5.1)
RBC # BLD: 3.59 M/CU MM (ref 4.2–5.4)
SEGMENTED NEUTROPHILS ABSOLUTE COUNT: 2.5 K/CU MM
SEGMENTED NEUTROPHILS RELATIVE PERCENT: 60.1 % (ref 36–66)
SODIUM BLD-SCNC: 140 MMOL/L (ref 135–145)
TOTAL IRON BINDING CAPACITY: 190 UG/DL (ref 250–450)
TOTAL PROTEIN: 6.1 GM/DL (ref 6.4–8.2)
UNSATURATED IRON BINDING CAPACITY: 125 UG/DL (ref 110–370)
VITAMIN B-12: 722 PG/ML (ref 211–911)
WBC # BLD: 4.1 K/CU MM (ref 4–10.5)

## 2023-10-30 PROCEDURE — 85025 COMPLETE CBC W/AUTO DIFF WBC: CPT

## 2023-10-30 PROCEDURE — 80053 COMPREHEN METABOLIC PANEL: CPT

## 2023-10-30 PROCEDURE — 82607 VITAMIN B-12: CPT

## 2023-10-30 PROCEDURE — 83550 IRON BINDING TEST: CPT

## 2023-10-30 PROCEDURE — 82746 ASSAY OF FOLIC ACID SERUM: CPT

## 2023-10-30 PROCEDURE — 83540 ASSAY OF IRON: CPT

## 2023-10-30 PROCEDURE — 82728 ASSAY OF FERRITIN: CPT

## 2023-10-30 PROCEDURE — 36415 COLL VENOUS BLD VENIPUNCTURE: CPT

## 2023-10-31 ENCOUNTER — CLINICAL DOCUMENTATION (OUTPATIENT)
Dept: ONCOLOGY | Age: 73
End: 2023-10-31

## 2023-10-31 RX ORDER — FOLIC ACID 1 MG/1
2 TABLET ORAL DAILY
Qty: 180 TABLET | Refills: 1 | Status: SHIPPED | OUTPATIENT
Start: 2023-10-31

## 2023-10-31 NOTE — PROGRESS NOTES
RX for folic acid 2 mg daily e-scribed to [address] per physician order.  Will call patient @ [phone] to update

## 2024-01-03 ENCOUNTER — HOSPITAL ENCOUNTER (OUTPATIENT)
Age: 74
Setting detail: OBSERVATION
LOS: 1 days | Discharge: HOME | DRG: 948 | End: 2024-01-04
Attending: EMERGENCY MEDICINE | Admitting: STUDENT IN AN ORGANIZED HEALTH CARE EDUCATION/TRAINING PROGRAM
Payer: MEDICARE

## 2024-01-03 ENCOUNTER — APPOINTMENT (OUTPATIENT)
Dept: CT IMAGING | Age: 74
DRG: 948 | End: 2024-01-03
Payer: MEDICARE

## 2024-01-03 DIAGNOSIS — R41.82 ALTERED MENTAL STATUS, UNSPECIFIED ALTERED MENTAL STATUS TYPE: Primary | ICD-10-CM

## 2024-01-03 PROBLEM — R41.0 CONFUSION: Status: ACTIVE | Noted: 2024-01-03

## 2024-01-03 LAB
ALBUMIN SERPL-MCNC: 4.3 GM/DL (ref 3.4–5)
ALP BLD-CCNC: 87 IU/L (ref 40–129)
ALT SERPL-CCNC: <5 U/L (ref 10–40)
ANION GAP SERPL CALCULATED.3IONS-SCNC: 15 MMOL/L (ref 7–16)
AST SERPL-CCNC: 11 IU/L (ref 15–37)
BASOPHILS ABSOLUTE: 0.1 K/CU MM
BASOPHILS RELATIVE PERCENT: 0.8 % (ref 0–1)
BILIRUB SERPL-MCNC: 0.9 MG/DL (ref 0–1)
BILIRUBIN URINE: NEGATIVE MG/DL
BLOOD, URINE: NEGATIVE
BUN SERPL-MCNC: 12 MG/DL (ref 6–23)
CALCIUM SERPL-MCNC: 9.3 MG/DL (ref 8.3–10.6)
CHLORIDE BLD-SCNC: 96 MMOL/L (ref 99–110)
CLARITY: CLEAR
CO2: 25 MMOL/L (ref 21–32)
COLOR: YELLOW
COMMENT UA: NORMAL
CREAT SERPL-MCNC: 0.9 MG/DL (ref 0.6–1.1)
DIFFERENTIAL TYPE: ABNORMAL
EOSINOPHILS ABSOLUTE: 0.2 K/CU MM
EOSINOPHILS RELATIVE PERCENT: 2.3 % (ref 0–3)
GFR SERPL CREATININE-BSD FRML MDRD: >60 ML/MIN/1.73M2
GLUCOSE SERPL-MCNC: 107 MG/DL (ref 70–99)
GLUCOSE, URINE: NEGATIVE MG/DL
HCT VFR BLD CALC: 46.3 % (ref 37–47)
HEMOGLOBIN: 15.1 GM/DL (ref 12.5–16)
IMMATURE NEUTROPHIL %: 0.7 % (ref 0–0.43)
INFLUENZA A ANTIGEN: NOT DETECTED
INFLUENZA B ANTIGEN: NOT DETECTED
KETONES, URINE: NEGATIVE MG/DL
LEUKOCYTE ESTERASE, URINE: NEGATIVE
LYMPHOCYTES ABSOLUTE: 1 K/CU MM
LYMPHOCYTES RELATIVE PERCENT: 12.9 % (ref 24–44)
MCH RBC QN AUTO: 35.6 PG (ref 27–31)
MCHC RBC AUTO-ENTMCNC: 32.6 % (ref 32–36)
MCV RBC AUTO: 109.2 FL (ref 78–100)
MONOCYTES ABSOLUTE: 0.3 K/CU MM
MONOCYTES RELATIVE PERCENT: 4.5 % (ref 0–4)
NITRITE URINE, QUANTITATIVE: NEGATIVE
PDW BLD-RTO: 15.6 % (ref 11.7–14.9)
PH, URINE: 5.5 (ref 5–8)
PLATELET # BLD: 336 K/CU MM (ref 140–440)
PMV BLD AUTO: 10.3 FL (ref 7.5–11.1)
POTASSIUM SERPL-SCNC: 3.7 MMOL/L (ref 3.5–5.1)
PROTEIN UA: NEGATIVE MG/DL
RBC # BLD: 4.24 M/CU MM (ref 4.2–5.4)
SARS-COV-2 RDRP RESP QL NAA+PROBE: NOT DETECTED
SEGMENTED NEUTROPHILS ABSOLUTE COUNT: 5.8 K/CU MM
SEGMENTED NEUTROPHILS RELATIVE PERCENT: 78.8 % (ref 36–66)
SODIUM BLD-SCNC: 136 MMOL/L (ref 135–145)
SOURCE: NORMAL
SPECIFIC GRAVITY UA: <1.005 (ref 1–1.03)
TOTAL IMMATURE NEUTOROPHIL: 0.05 K/CU MM
TOTAL PROTEIN: 7.2 GM/DL (ref 6.4–8.2)
TSH SERPL DL<=0.005 MIU/L-ACNC: 1.52 UIU/ML (ref 0.27–4.2)
UROBILINOGEN, URINE: 0.2 MG/DL (ref 0.2–1)
WBC # BLD: 7.4 K/CU MM (ref 4–10.5)

## 2024-01-03 PROCEDURE — 93005 ELECTROCARDIOGRAM TRACING: CPT | Performed by: EMERGENCY MEDICINE

## 2024-01-03 PROCEDURE — 87635 SARS-COV-2 COVID-19 AMP PRB: CPT

## 2024-01-03 PROCEDURE — 1200000000 HC SEMI PRIVATE

## 2024-01-03 PROCEDURE — 80053 COMPREHEN METABOLIC PANEL: CPT

## 2024-01-03 PROCEDURE — 2580000003 HC RX 258: Performed by: STUDENT IN AN ORGANIZED HEALTH CARE EDUCATION/TRAINING PROGRAM

## 2024-01-03 PROCEDURE — 70450 CT HEAD/BRAIN W/O DYE: CPT

## 2024-01-03 PROCEDURE — 84443 ASSAY THYROID STIM HORMONE: CPT

## 2024-01-03 PROCEDURE — G0378 HOSPITAL OBSERVATION PER HR: HCPCS

## 2024-01-03 PROCEDURE — 81003 URINALYSIS AUTO W/O SCOPE: CPT

## 2024-01-03 PROCEDURE — 85025 COMPLETE CBC W/AUTO DIFF WBC: CPT

## 2024-01-03 PROCEDURE — 99285 EMERGENCY DEPT VISIT HI MDM: CPT

## 2024-01-03 PROCEDURE — 6370000000 HC RX 637 (ALT 250 FOR IP): Performed by: STUDENT IN AN ORGANIZED HEALTH CARE EDUCATION/TRAINING PROGRAM

## 2024-01-03 PROCEDURE — 87502 INFLUENZA DNA AMP PROBE: CPT

## 2024-01-03 RX ORDER — CARVEDILOL 6.25 MG/1
6.25 TABLET ORAL 2 TIMES DAILY
Status: DISCONTINUED | OUTPATIENT
Start: 2024-01-03 | End: 2024-01-04 | Stop reason: HOSPADM

## 2024-01-03 RX ORDER — LEVOTHYROXINE SODIUM 0.07 MG/1
75 TABLET ORAL DAILY
Status: DISCONTINUED | OUTPATIENT
Start: 2024-01-04 | End: 2024-01-04 | Stop reason: HOSPADM

## 2024-01-03 RX ORDER — POTASSIUM CHLORIDE 7.45 MG/ML
10 INJECTION INTRAVENOUS PRN
Status: DISCONTINUED | OUTPATIENT
Start: 2024-01-03 | End: 2024-01-04 | Stop reason: HOSPADM

## 2024-01-03 RX ORDER — FOLIC ACID 1 MG/1
2 TABLET ORAL DAILY
Status: DISCONTINUED | OUTPATIENT
Start: 2024-01-04 | End: 2024-01-04 | Stop reason: HOSPADM

## 2024-01-03 RX ORDER — SODIUM CHLORIDE 9 MG/ML
INJECTION, SOLUTION INTRAVENOUS PRN
Status: DISCONTINUED | OUTPATIENT
Start: 2024-01-03 | End: 2024-01-04 | Stop reason: HOSPADM

## 2024-01-03 RX ORDER — ASPIRIN 81 MG/1
81 TABLET, CHEWABLE ORAL DAILY
Status: DISCONTINUED | OUTPATIENT
Start: 2024-01-04 | End: 2024-01-04 | Stop reason: HOSPADM

## 2024-01-03 RX ORDER — ACETAMINOPHEN 650 MG/1
650 SUPPOSITORY RECTAL EVERY 6 HOURS PRN
Status: DISCONTINUED | OUTPATIENT
Start: 2024-01-03 | End: 2024-01-04 | Stop reason: HOSPADM

## 2024-01-03 RX ORDER — ONDANSETRON 2 MG/ML
4 INJECTION INTRAMUSCULAR; INTRAVENOUS EVERY 6 HOURS PRN
Status: DISCONTINUED | OUTPATIENT
Start: 2024-01-03 | End: 2024-01-04 | Stop reason: HOSPADM

## 2024-01-03 RX ORDER — ACETAMINOPHEN 325 MG/1
650 TABLET ORAL EVERY 6 HOURS PRN
Status: DISCONTINUED | OUTPATIENT
Start: 2024-01-03 | End: 2024-01-04 | Stop reason: HOSPADM

## 2024-01-03 RX ORDER — MAGNESIUM SULFATE IN WATER 40 MG/ML
2000 INJECTION, SOLUTION INTRAVENOUS PRN
Status: DISCONTINUED | OUTPATIENT
Start: 2024-01-03 | End: 2024-01-04 | Stop reason: HOSPADM

## 2024-01-03 RX ORDER — POLYETHYLENE GLYCOL 3350 17 G/17G
17 POWDER, FOR SOLUTION ORAL DAILY PRN
Status: DISCONTINUED | OUTPATIENT
Start: 2024-01-03 | End: 2024-01-04 | Stop reason: HOSPADM

## 2024-01-03 RX ORDER — ATORVASTATIN CALCIUM 40 MG/1
40 TABLET, FILM COATED ORAL NIGHTLY
Status: DISCONTINUED | OUTPATIENT
Start: 2024-01-03 | End: 2024-01-04 | Stop reason: HOSPADM

## 2024-01-03 RX ORDER — ONDANSETRON 4 MG/1
4 TABLET, ORALLY DISINTEGRATING ORAL EVERY 8 HOURS PRN
Status: DISCONTINUED | OUTPATIENT
Start: 2024-01-03 | End: 2024-01-04 | Stop reason: HOSPADM

## 2024-01-03 RX ORDER — AMLODIPINE BESYLATE 5 MG/1
5 TABLET ORAL DAILY
Status: DISCONTINUED | OUTPATIENT
Start: 2024-01-03 | End: 2024-01-04 | Stop reason: HOSPADM

## 2024-01-03 RX ORDER — LOSARTAN POTASSIUM 100 MG/1
100 TABLET ORAL DAILY
Status: DISCONTINUED | OUTPATIENT
Start: 2024-01-03 | End: 2024-01-04 | Stop reason: HOSPADM

## 2024-01-03 RX ORDER — SODIUM CHLORIDE 0.9 % (FLUSH) 0.9 %
5-40 SYRINGE (ML) INJECTION EVERY 12 HOURS SCHEDULED
Status: DISCONTINUED | OUTPATIENT
Start: 2024-01-03 | End: 2024-01-04 | Stop reason: HOSPADM

## 2024-01-03 RX ORDER — ENOXAPARIN SODIUM 100 MG/ML
40 INJECTION SUBCUTANEOUS EVERY EVENING
Status: DISCONTINUED | OUTPATIENT
Start: 2024-01-04 | End: 2024-01-03

## 2024-01-03 RX ORDER — SODIUM CHLORIDE 0.9 % (FLUSH) 0.9 %
5-40 SYRINGE (ML) INJECTION PRN
Status: DISCONTINUED | OUTPATIENT
Start: 2024-01-03 | End: 2024-01-04 | Stop reason: HOSPADM

## 2024-01-03 RX ORDER — NICOTINE 21 MG/24HR
1 PATCH, TRANSDERMAL 24 HOURS TRANSDERMAL DAILY PRN
Status: DISCONTINUED | OUTPATIENT
Start: 2024-01-03 | End: 2024-01-04 | Stop reason: HOSPADM

## 2024-01-03 RX ORDER — SODIUM CHLORIDE, SODIUM LACTATE, POTASSIUM CHLORIDE, CALCIUM CHLORIDE 600; 310; 30; 20 MG/100ML; MG/100ML; MG/100ML; MG/100ML
INJECTION, SOLUTION INTRAVENOUS CONTINUOUS
Status: DISPENSED | OUTPATIENT
Start: 2024-01-03 | End: 2024-01-04

## 2024-01-03 RX ADMIN — SODIUM CHLORIDE, PRESERVATIVE FREE 10 ML: 5 INJECTION INTRAVENOUS at 22:15

## 2024-01-03 RX ADMIN — RIVAROXABAN 20 MG: 20 TABLET, FILM COATED ORAL at 23:17

## 2024-01-03 RX ADMIN — AMLODIPINE BESYLATE 5 MG: 5 TABLET ORAL at 22:16

## 2024-01-03 RX ADMIN — ATORVASTATIN CALCIUM 40 MG: 40 TABLET, FILM COATED ORAL at 22:16

## 2024-01-03 RX ADMIN — CARVEDILOL 6.25 MG: 6.25 TABLET, FILM COATED ORAL at 22:16

## 2024-01-03 RX ADMIN — LOSARTAN POTASSIUM 100 MG: 100 TABLET, FILM COATED ORAL at 22:16

## 2024-01-03 RX ADMIN — SODIUM CHLORIDE, POTASSIUM CHLORIDE, SODIUM LACTATE AND CALCIUM CHLORIDE: 600; 310; 30; 20 INJECTION, SOLUTION INTRAVENOUS at 22:15

## 2024-01-03 ASSESSMENT — PAIN - FUNCTIONAL ASSESSMENT: PAIN_FUNCTIONAL_ASSESSMENT: NONE - DENIES PAIN

## 2024-01-03 NOTE — ED PROVIDER NOTES
Emergency Department Encounter    Patient: Cathleen Cartagena  MRN: [id number]  : 1950  Date of Evaluation: 2024  ED Provider:  Barron Kim MD    MDM:    Clinical Impression:  1. Altered mental status, unspecified altered mental status type          Triage Chief Complaint: Altered Mental Status (Per daughter, over the last 48 hours patient has been very forgetful with simple tasks. AOx4 at this time. )        I completed a structured, evidence-based clinical evaluation to screen for acute emergent condition that poses a threat to life or bodily function.      Diagnostic studies/Differential diagnosis included: (with independent interpretations \"as interpreted by me\" and tests considered but not performed) patient had similar symptoms after prior CVA and thyroid surgery.  Patient has no focal neurologic complaints or deficits on exam.  Patient is not a candidate for thrombolysis given duration of symptoms.  Thyroid evaluated with TSH which was normal.  UA without evidence of urinary tract infection.  No leukocytosis or anemia.  No significant electrolyte abnormalities or renal insufficiency.  No significant hypo or hyperglycemia.  LFTs are unremarkable with no evidence of acute hepatic or biliary disease.  Influenza and COVID-19 testing is negative.  CT head without evidence of acute intracranial hemorrhage, mass, large infarct.  EKG without evidence of ACS or malignant arrhythmia.  Patient in the emergency department did demonstrate confusion such as patient was asked to provide a urine sample and went to the restroom holding the urine sample cup but forgot that she was holding it and did not urinate in the cup before returning to the room.  She states that she did urinate during that trip to the restroom, but forgot to urinate in the cup.  Patient will be hospitalized for further observation, evaluation, and management.  Patient verbalized understanding and agreement with plan.      I considered the  10.5 K/CU MM    RBC 4.24 4.2 - 5.4 M/CU MM    Hemoglobin 15.1 12.5 - 16.0 GM/DL    Hematocrit 46.3 37 - 47 %    .2 (H) 78 - 100 FL    MCH 35.6 (H) 27 - 31 PG    MCHC 32.6 32.0 - 36.0 %    RDW 15.6 (H) 11.7 - 14.9 %    Platelets 336 140 - 440 K/CU MM    MPV 10.3 7.5 - 11.1 FL    Differential Type AUTOMATED DIFFERENTIAL     Segs Relative 78.8 (H) 36 - 66 %    Lymphocytes % 12.9 (L) 24 - 44 %    Monocytes % 4.5 (H) 0 - 4 %    Eosinophils % 2.3 0 - 3 %    Basophils % 0.8 0 - 1 %    Segs Absolute 5.8 K/CU MM    Lymphocytes Absolute 1.0 K/CU MM    Monocytes Absolute 0.3 K/CU MM    Eosinophils Absolute 0.2 K/CU MM    Basophils Absolute 0.1 K/CU MM    Immature Neutrophil % 0.7 (H) 0 - 0.43 %    Total Immature Neutrophil 0.05 K/CU MM   Comprehensive Metabolic Panel   Result Value Ref Range    Sodium 136 135 - 145 MMOL/L    Potassium 3.7 3.5 - 5.1 MMOL/L    Chloride 96 (L) 99 - 110 mMol/L    CO2 25 21 - 32 MMOL/L    Anion Gap 15 7 - 16    Glucose 107 (H) 70 - 99 MG/DL    BUN 12 6 - 23 MG/DL    Creatinine 0.9 0.6 - 1.1 MG/DL    Est, Glom Filt Rate >60 >60 mL/min/1.73m2    Calcium 9.3 8.3 - 10.6 MG/DL    Total Protein 7.2 6.4 - 8.2 GM/DL    Albumin 4.3 3.4 - 5.0 GM/DL    Total Bilirubin 0.9 0.0 - 1.0 MG/DL    Alkaline Phosphatase 87 40 - 129 IU/L    ALT <5 (L) 10 - 40 U/L    AST 11 (L) 15 - 37 IU/L   Basic Metabolic Panel w/ Reflex to MG   Result Value Ref Range    Sodium 136 135 - 145 MMOL/L    Potassium 4.3 3.5 - 5.1 MMOL/L    Chloride 100 99 - 110 mMol/L    CO2 24 21 - 32 MMOL/L    Anion Gap 12 7 - 16    Glucose 87 70 - 99 MG/DL    BUN 10 6 - 23 MG/DL    Creatinine 0.9 0.6 - 1.1 MG/DL    Est, Glom Filt Rate >60 >60 mL/min/1.73m2    Calcium 8.8 8.3 - 10.6 MG/DL   CBC with Auto Differential   Result Value Ref Range    WBC 6.9 4.0 - 10.5 K/CU MM    RBC 3.91 (L) 4.2 - 5.4 M/CU MM    Hemoglobin 13.9 12.5 - 16.0 GM/DL    Hematocrit 43.5 37 - 47 %    .3 (H) 78 - 100 FL    MCH 35.5 (H) 27 - 31 PG    MCHC 32.0 32.0 -

## 2024-01-04 VITALS
HEIGHT: 63 IN | SYSTOLIC BLOOD PRESSURE: 111 MMHG | WEIGHT: 136 LBS | DIASTOLIC BLOOD PRESSURE: 63 MMHG | RESPIRATION RATE: 14 BRPM | OXYGEN SATURATION: 97 % | BODY MASS INDEX: 24.1 KG/M2 | HEART RATE: 79 BPM | TEMPERATURE: 97.9 F

## 2024-01-04 LAB
ANION GAP SERPL CALCULATED.3IONS-SCNC: 12 MMOL/L (ref 7–16)
BASOPHILS ABSOLUTE: 0.1 K/CU MM
BASOPHILS RELATIVE PERCENT: 0.7 % (ref 0–1)
BUN SERPL-MCNC: 10 MG/DL (ref 6–23)
CALCIUM SERPL-MCNC: 8.8 MG/DL (ref 8.3–10.6)
CHLORIDE BLD-SCNC: 100 MMOL/L (ref 99–110)
CO2: 24 MMOL/L (ref 21–32)
CREAT SERPL-MCNC: 0.9 MG/DL (ref 0.6–1.1)
DIFFERENTIAL TYPE: ABNORMAL
EKG ATRIAL RATE: 72 BPM
EKG DIAGNOSIS: NORMAL
EKG P AXIS: 53 DEGREES
EKG P-R INTERVAL: 142 MS
EKG Q-T INTERVAL: 424 MS
EKG QRS DURATION: 88 MS
EKG QTC CALCULATION (BAZETT): 464 MS
EKG R AXIS: 3 DEGREES
EKG T AXIS: 58 DEGREES
EKG VENTRICULAR RATE: 72 BPM
EOSINOPHILS ABSOLUTE: 0.2 K/CU MM
EOSINOPHILS RELATIVE PERCENT: 3.5 % (ref 0–3)
GFR SERPL CREATININE-BSD FRML MDRD: >60 ML/MIN/1.73M2
GLUCOSE SERPL-MCNC: 87 MG/DL (ref 70–99)
HCT VFR BLD CALC: 43.5 % (ref 37–47)
HEMOGLOBIN: 13.9 GM/DL (ref 12.5–16)
IMMATURE NEUTROPHIL %: 0.6 % (ref 0–0.43)
INR BLD: 1.8 INDEX
LYMPHOCYTES ABSOLUTE: 1.3 K/CU MM
LYMPHOCYTES RELATIVE PERCENT: 18.5 % (ref 24–44)
MCH RBC QN AUTO: 35.5 PG (ref 27–31)
MCHC RBC AUTO-ENTMCNC: 32 % (ref 32–36)
MCV RBC AUTO: 111.3 FL (ref 78–100)
MONOCYTES ABSOLUTE: 0.4 K/CU MM
MONOCYTES RELATIVE PERCENT: 5.5 % (ref 0–4)
NUCLEATED RBC %: 0 %
PDW BLD-RTO: 15.6 % (ref 11.7–14.9)
PLATELET # BLD: 347 K/CU MM (ref 140–440)
PMV BLD AUTO: 10.3 FL (ref 7.5–11.1)
POTASSIUM SERPL-SCNC: 4.3 MMOL/L (ref 3.5–5.1)
PROTHROMBIN TIME: 20.9 SECONDS (ref 11.7–14.5)
RBC # BLD: 3.91 M/CU MM (ref 4.2–5.4)
SEGMENTED NEUTROPHILS ABSOLUTE COUNT: 4.9 K/CU MM
SEGMENTED NEUTROPHILS RELATIVE PERCENT: 71.2 % (ref 36–66)
SODIUM BLD-SCNC: 136 MMOL/L (ref 135–145)
TOTAL IMMATURE NEUTOROPHIL: 0.04 K/CU MM
TOTAL NUCLEATED RBC: 0 K/CU MM
WBC # BLD: 6.9 K/CU MM (ref 4–10.5)

## 2024-01-04 PROCEDURE — 93010 ELECTROCARDIOGRAM REPORT: CPT | Performed by: INTERNAL MEDICINE

## 2024-01-04 PROCEDURE — 85025 COMPLETE CBC W/AUTO DIFF WBC: CPT

## 2024-01-04 PROCEDURE — 80048 BASIC METABOLIC PNL TOTAL CA: CPT

## 2024-01-04 PROCEDURE — 6370000000 HC RX 637 (ALT 250 FOR IP): Performed by: STUDENT IN AN ORGANIZED HEALTH CARE EDUCATION/TRAINING PROGRAM

## 2024-01-04 PROCEDURE — 94761 N-INVAS EAR/PLS OXIMETRY MLT: CPT

## 2024-01-04 PROCEDURE — 36415 COLL VENOUS BLD VENIPUNCTURE: CPT

## 2024-01-04 PROCEDURE — 2580000003 HC RX 258: Performed by: STUDENT IN AN ORGANIZED HEALTH CARE EDUCATION/TRAINING PROGRAM

## 2024-01-04 PROCEDURE — 85610 PROTHROMBIN TIME: CPT

## 2024-01-04 PROCEDURE — G0378 HOSPITAL OBSERVATION PER HR: HCPCS

## 2024-01-04 RX ADMIN — ASPIRIN 81 MG: 81 TABLET, CHEWABLE ORAL at 09:40

## 2024-01-04 RX ADMIN — SODIUM CHLORIDE, PRESERVATIVE FREE 10 ML: 5 INJECTION INTRAVENOUS at 09:40

## 2024-01-04 RX ADMIN — CARVEDILOL 6.25 MG: 6.25 TABLET, FILM COATED ORAL at 09:40

## 2024-01-04 RX ADMIN — FOLIC ACID 2 MG: 1 TABLET ORAL at 09:40

## 2024-01-04 RX ADMIN — LOSARTAN POTASSIUM 100 MG: 100 TABLET, FILM COATED ORAL at 09:40

## 2024-01-04 RX ADMIN — LEVOTHYROXINE SODIUM 75 MCG: 0.07 TABLET ORAL at 06:09

## 2024-01-04 RX ADMIN — AMLODIPINE BESYLATE 5 MG: 5 TABLET ORAL at 09:40

## 2024-01-04 NOTE — DISCHARGE INSTRUCTIONS
Please continue your all home medications, follow-up with neurology in 3 to 7 days, return to ER for worsening symptoms or any other concerns.

## 2024-01-04 NOTE — ED NOTES
Report called to 1N at [facility] on behalf of MIKAEL Silverman RN who handed patient off at 1900

## 2024-01-04 NOTE — DISCHARGE SUMMARY
V2.0  Discharge Summary    Name:  Cathleen Cartagena /Age/Sex: 1950 (73 y.o. female)   Admit Date: 1/3/2024  Discharge Date: 24    MRN & CSN:  [id number] & [id number] Encounter Date and Time 24 10:24 AM EST    Attending:  Markel Nolasco MD Discharging Provider: ASAF Bazan - CNP       Hospital Course:     Brief HPI: Cathleen Cartagena is a 73 y.o. female with a history of hypertension, CAD SP CABG, paroxysmal atrial fibrillation, hypertension, hypothyroidism, CVA who presented with mental status change.  Patient reported confusion for 2 days prior.  The symptoms including difficulty remembering simple daily tasks.  No speech change.  No weakness of extremities.  Patient's NIH score is 0.  Patient stated she returned to normal today.  No headache.  Alert and oriented, answering questions appropriately.  Patient is on Xarelto and statin.  Patient's CT of head unremarkable.  UA is negative for UTI.  Offered MRI of brain today.  But patient want to go home.  Patient is referred to neurology outpatient follow-up.  She will continue current medications.    Brief Problem Based Course:   # Transient confusion - resolved  - Endorsed increased confusion over past 2 days with difficulty remembering simple tasks such as dressing or washing dishes. Had recent URI symptoms. No recent changes in medications, LOC or seizure-like activity.  -CT of the head unremarkable  -NIH score 0  -UA negative for UTI  -Alert and oriented this morning, no focal neurology deficit, return to her baseline  -Possible early on side of foot dementia, outpatient follow-up with neurology     # CAD s/p CABG in   - Continue ASA and Lipitor.     # Paroxysmal atrial fibrillation  - Continue Xarelto and Coreg.     # Essential hypretension  - Continue Norvasc, Losartan and Coreg.     # Acquired hypothyroidism  - TSH normal on admission..   - Continue Synthroid.         The patient expressed appropriate understanding of, and agreement with the

## 2024-01-04 NOTE — PROGRESS NOTES
4 Eyes Skin Assessment     NAME:  Cathleen Cartagena  YOB: 1950  MEDICAL RECORD NUMBER:  [id number]    The patient is being assessed for  Admission    I agree that at least one RN has performed a thorough Head to Toe Skin Assessment on the patient. ALL assessment sites listed below have been assessed.      Areas assessed by both nurses:    Head, Face, Ears, Shoulders, Back, Chest, Arms, Elbows, Hands, Sacrum. Buttock, Coccyx, Ischium, Legs. Feet and Heels, and Under Medical Devices         Does the Patient have a Wound? No noted wound(s)       Jae Prevention initiated by RN: Yes  Wound Care Orders initiated by RN: No    Pressure Injury (Stage 3,4, Unstageable, DTI, NWPT, and Complex wounds) if present, place Wound referral order by RN under : No    New Ostomies, if present place, Ostomy referral order under : No     Nurse 1 eSignature: Electronically signed by Cathleen Armstrong RN on 1/3/24 at 8:48 PM EST    **SHARE this note so that the co-signing nurse can place an eSignature**    Nurse 2 eSignature: Electronically signed by Radha Marlow LPN on 1/3/24 at 9:36 PM EST

## 2024-01-04 NOTE — H&P
History and Physical      Name:  Cathleen Cartagena /Age/Sex: 1950  (73 y.o. female)   MRN & CSN:  [id number] & [id number] Encounter Date/Time: 1/3/2024 9:08 PM   Location:  [facility] PCP: Poncho Randall MD       Hospital Day: 1    Assessment and Plan:     Patient is a 73-year-old female who presented with confusion. Transferred from [place] ED.    # Transient confusion - resolved  - Endorsed increased confusion over past 2 days with difficulty remembering simple tasks such as dressing or washing dishes. Had recent URI symptoms. No recent changes in medications, LOC or seizure-like activity.  - A&Ox4, attentive. Normal mentation on exam, labs, TSH and CTH unremarkable.  - Will benefit from early discharge.    # CAD s/p CABG in   - Continue ASA and Lipitor.    # Paroxysmal atrial fibrillation  - Continue Xarelto and Coreg.    # Essential hypretension  - Continue Norvasc, Losartan and Coreg.    # Acquired hypothyroidism  - TSH normal on admission..   - Continue Synthroid.    Checklist:  Advanced directive: full  Diet: cardiac  DVT ppx: Xarelto    Disposition: place in observation.  Estimated discharge: 1 day(s).  Current living situation: home.  Expected disposition: home.    Spoke with ED provider who recommended admission for the patient and I agree with that plan.  Personally reviewed lab studies and imaging.  EKG interpreted personally and results as stated above.  Imaging that was interpreted personally and results as stated above.    History of Present Illness:     Chief Complaint: Confusion    Patient is a 73-year-old female with a PMHx of CAD s/p CABG in , PAF, HTN and hypothyroidism who presented to the ED with increased confusion over past 2 days with difficulty remembering simple tasks such as dressing or washing dishes. Had recent URI symptoms. No recent changes in medications, LOC or seizure-like activity. Upon arrival to [facility], patient reported that her mentation is back to baseline. Denied

## 2024-01-11 ENCOUNTER — HOSPITAL ENCOUNTER (OUTPATIENT)
Dept: INFUSION THERAPY | Age: 74
Discharge: HOME | End: 2024-01-11
Payer: MEDICARE

## 2024-01-11 ENCOUNTER — OFFICE VISIT (OUTPATIENT)
Dept: ONCOLOGY | Age: 74
End: 2024-01-11
Payer: MEDICARE

## 2024-01-11 VITALS
HEIGHT: 66 IN | SYSTOLIC BLOOD PRESSURE: 107 MMHG | DIASTOLIC BLOOD PRESSURE: 58 MMHG | TEMPERATURE: 97 F | RESPIRATION RATE: 16 BRPM | HEART RATE: 73 BPM | OXYGEN SATURATION: 96 % | WEIGHT: 126.4 LBS | BODY MASS INDEX: 20.31 KG/M2

## 2024-01-11 DIAGNOSIS — C44.92 SQUAMOUS CELL CARCINOMA OF SKIN, UNSPECIFIED: ICD-10-CM

## 2024-01-11 DIAGNOSIS — R63.4 WEIGHT LOSS: ICD-10-CM

## 2024-01-11 DIAGNOSIS — D45 POLYCYTHEMIA VERA (HCC): Primary | ICD-10-CM

## 2024-01-11 PROCEDURE — 1111F DSCHRG MED/CURRENT MED MERGE: CPT | Performed by: INTERNAL MEDICINE

## 2024-01-11 PROCEDURE — 1036F TOBACCO NON-USER: CPT | Performed by: INTERNAL MEDICINE

## 2024-01-11 PROCEDURE — G8399 PT W/DXA RESULTS DOCUMENT: HCPCS | Performed by: INTERNAL MEDICINE

## 2024-01-11 PROCEDURE — 1123F ACP DISCUSS/DSCN MKR DOCD: CPT | Performed by: INTERNAL MEDICINE

## 2024-01-11 PROCEDURE — G8420 CALC BMI NORM PARAMETERS: HCPCS | Performed by: INTERNAL MEDICINE

## 2024-01-11 PROCEDURE — 99214 OFFICE O/P EST MOD 30 MIN: CPT | Performed by: INTERNAL MEDICINE

## 2024-01-11 PROCEDURE — 3017F COLORECTAL CA SCREEN DOC REV: CPT | Performed by: INTERNAL MEDICINE

## 2024-01-11 PROCEDURE — G8484 FLU IMMUNIZE NO ADMIN: HCPCS | Performed by: INTERNAL MEDICINE

## 2024-01-11 PROCEDURE — 1090F PRES/ABSN URINE INCON ASSESS: CPT | Performed by: INTERNAL MEDICINE

## 2024-01-11 PROCEDURE — 99211 OFF/OP EST MAY X REQ PHY/QHP: CPT

## 2024-01-11 PROCEDURE — G8427 DOCREV CUR MEDS BY ELIG CLIN: HCPCS | Performed by: INTERNAL MEDICINE

## 2024-01-11 RX ORDER — PHENOL 1.4 %
AEROSOL, SPRAY (ML) MUCOUS MEMBRANE
COMMUNITY

## 2024-01-11 RX ORDER — ASPIRIN 81 MG/1
81 TABLET ORAL DAILY
COMMUNITY

## 2024-01-11 RX ORDER — LEVOTHYROXINE SODIUM 0.07 MG/1
75 TABLET ORAL DAILY
COMMUNITY

## 2024-01-11 NOTE — PROGRESS NOTES
MA Rooming Questions  Patient: Cathleen Cartagena  MRN: [id number]    Date: 1/11/2024        1. Do you have any new issues?   yes - pt having memory issues         2. Do you need any refills on medications?    no    3. Have you had any imaging done since your last visit?   yes - CT HEAD [facility] 1/3    4. Have you been hospitalized or seen in the emergency room since your last visit here?   yes - [facility] 1/3 for memory issues    5. Did the patient have a depression screening completed today? No    No data recorded     PHQ-9 Given to (if applicable):               PHQ-9 Score (if applicable):                     [] Positive     []  Negative              Does question #9 need addressed (if applicable)                     [] Yes    []  No               Kourtney Cooper MA    
   BILITOT 0.9 01/03/2024    ALKPHOS 87 01/03/2024    AST 11 (L) 01/03/2024    ALT <5 (L) 01/03/2024    LABGLOM >60 01/04/2024    GFRAA >60 09/21/2022    AGRATIO 1.6 04/17/2023    GLOB 2.7 04/17/2023    PHOS 3.3 09/21/2023    MG 1.8 09/21/2023    POCCA 1.31 09/23/2015    POCGLU 108 (H) 10/04/2021     Lab Results   Component Value Date     08/04/2023     No results found for: \"LD\"  Lab Results   Component Value Date    TSHHS 1.520 01/03/2024    T4FREE 1.29 10/24/2023    FT3 2.8 11/08/2016       Immunology:  Lab Results   Component Value Date    PROT 7.2 01/03/2024     No results found for: \"KAPPAUVOL\", \"LAMBDAUVOL\", \"KLFLCR\"  No results found for: \"B2M\"    Coagulation Panel:  Lab Results   Component Value Date    PROTIME 20.9 (H) 01/04/2024    INR 1.8 01/04/2024    APTT 31.3 09/17/2020       Anemia Panel:  Lab Results   Component Value Date    VMEVZJWL98 722.0 10/30/2023    FOLATE 2.7 (L) 10/30/2023       Tumor Markers:  No results found for: \"\", \"CEA\", \"\", \"LABCA2\", \"PSA\"      Imaging: Reviewed     Pathology:Reviewed     Observations:  Performance Status: ECOG 0  Depression Status: No data recorded          Assessment & Plan:  PVera/Low ferritin: Low Erythropoetin and JAK2 + suggestive of polycythemia vera. Started Hydrea jan 2018, as H/O thrombosis(needing CABG) and also Possible TIA. leukocytosis in may 2019 was most probably sec to steroids, Flow at that point with left shift but otherwise normal.  Hydrea being adjusted according to the counts, currently taking 500 mg/day.  May 2023 CBC WBC of 23.8 hemoglobin of 16.7 hematocrit 59.4 MCV of 80.6 and platelets of 467.  LDH was elevated at 390 in April 2023.  Ferritin 77, B12 1558.  Recommended that she increases the Hydrea to 1000 mg daily.  Recommend one-time phlebotomy.  Continue folic acid.    Discontinued oral iron   Repeat CBC August 2023 WBC 6.3 hemoglobin of 14.1 hematocrit 45.2 MCV of 98 and platelets of 244LDH 199  Ferritin 373 and iron

## 2024-01-15 ENCOUNTER — TELEPHONE (OUTPATIENT)
Dept: ONCOLOGY | Age: 74
End: 2024-01-15

## 2024-01-22 ENCOUNTER — HOSPITAL ENCOUNTER (OUTPATIENT)
Dept: CT IMAGING | Age: 74
Discharge: HOME | End: 2024-01-22
Attending: INTERNAL MEDICINE
Payer: MEDICARE

## 2024-01-22 DIAGNOSIS — R63.4 WEIGHT LOSS: ICD-10-CM

## 2024-01-22 DIAGNOSIS — C44.92 SQUAMOUS CELL CARCINOMA OF SKIN, UNSPECIFIED: ICD-10-CM

## 2024-01-22 DIAGNOSIS — D45 POLYCYTHEMIA VERA (HCC): ICD-10-CM

## 2024-01-22 PROCEDURE — 71260 CT THORAX DX C+: CPT

## 2024-01-22 PROCEDURE — 6360000004 HC RX CONTRAST MEDICATION: Performed by: INTERNAL MEDICINE

## 2024-01-22 PROCEDURE — 74177 CT ABD & PELVIS W/CONTRAST: CPT

## 2024-01-22 RX ADMIN — IOPAMIDOL 75 ML: 755 INJECTION, SOLUTION INTRAVENOUS at 09:16

## 2024-01-25 ENCOUNTER — CLINICAL DOCUMENTATION (OUTPATIENT)
Dept: ONCOLOGY | Age: 74
End: 2024-01-25

## 2024-01-25 NOTE — PROGRESS NOTES
This nurse called the patient to review her CT results and advise that Dr Bello states it looks ok. This nurse also inquired about any abd pain, diarrhea or bleeding. The patient states that she has some generalized abd pain but it is not constant. She denies any other symptoms. This nurse called the office of Dr Juan Bello and they advised the patient has a colonoscopy in 2016 and is due for another on in 2026. This nurse will update provider.

## 2024-01-26 ENCOUNTER — OFFICE VISIT (OUTPATIENT)
Dept: NEUROLOGY | Age: 74
End: 2024-01-26
Payer: MEDICARE

## 2024-01-26 VITALS
WEIGHT: 126 LBS | SYSTOLIC BLOOD PRESSURE: 108 MMHG | BODY MASS INDEX: 20.25 KG/M2 | DIASTOLIC BLOOD PRESSURE: 72 MMHG | OXYGEN SATURATION: 97 % | HEART RATE: 75 BPM | HEIGHT: 66 IN

## 2024-01-26 DIAGNOSIS — F01.A0 MILD VASCULAR DEMENTIA WITHOUT BEHAVIORAL DISTURBANCE, PSYCHOTIC DISTURBANCE, MOOD DISTURBANCE, OR ANXIETY (HCC): Primary | ICD-10-CM

## 2024-01-26 PROCEDURE — 1111F DSCHRG MED/CURRENT MED MERGE: CPT | Performed by: STUDENT IN AN ORGANIZED HEALTH CARE EDUCATION/TRAINING PROGRAM

## 2024-01-26 PROCEDURE — 1123F ACP DISCUSS/DSCN MKR DOCD: CPT | Performed by: STUDENT IN AN ORGANIZED HEALTH CARE EDUCATION/TRAINING PROGRAM

## 2024-01-26 PROCEDURE — G8484 FLU IMMUNIZE NO ADMIN: HCPCS | Performed by: STUDENT IN AN ORGANIZED HEALTH CARE EDUCATION/TRAINING PROGRAM

## 2024-01-26 PROCEDURE — 99205 OFFICE O/P NEW HI 60 MIN: CPT | Performed by: STUDENT IN AN ORGANIZED HEALTH CARE EDUCATION/TRAINING PROGRAM

## 2024-01-26 PROCEDURE — G8399 PT W/DXA RESULTS DOCUMENT: HCPCS | Performed by: STUDENT IN AN ORGANIZED HEALTH CARE EDUCATION/TRAINING PROGRAM

## 2024-01-26 PROCEDURE — 1090F PRES/ABSN URINE INCON ASSESS: CPT | Performed by: STUDENT IN AN ORGANIZED HEALTH CARE EDUCATION/TRAINING PROGRAM

## 2024-01-26 PROCEDURE — G8427 DOCREV CUR MEDS BY ELIG CLIN: HCPCS | Performed by: STUDENT IN AN ORGANIZED HEALTH CARE EDUCATION/TRAINING PROGRAM

## 2024-01-26 PROCEDURE — 1036F TOBACCO NON-USER: CPT | Performed by: STUDENT IN AN ORGANIZED HEALTH CARE EDUCATION/TRAINING PROGRAM

## 2024-01-26 PROCEDURE — 3017F COLORECTAL CA SCREEN DOC REV: CPT | Performed by: STUDENT IN AN ORGANIZED HEALTH CARE EDUCATION/TRAINING PROGRAM

## 2024-01-26 PROCEDURE — G8420 CALC BMI NORM PARAMETERS: HCPCS | Performed by: STUDENT IN AN ORGANIZED HEALTH CARE EDUCATION/TRAINING PROGRAM

## 2024-01-26 RX ORDER — DONEPEZIL HYDROCHLORIDE 5 MG/1
TABLET, FILM COATED ORAL
Qty: 45 TABLET | Refills: 0 | Status: SHIPPED | OUTPATIENT
Start: 2024-01-26

## 2024-01-26 RX ORDER — DONEPEZIL HYDROCHLORIDE 10 MG/1
10 TABLET, FILM COATED ORAL DAILY
Qty: 30 TABLET | Refills: 5 | Status: SHIPPED | OUTPATIENT
Start: 2024-01-26 | End: 2024-01-26

## 2024-01-26 RX ORDER — DONEPEZIL HYDROCHLORIDE 10 MG/1
10 TABLET, FILM COATED ORAL DAILY
Qty: 30 TABLET | Refills: 5 | Status: SHIPPED | OUTPATIENT
Start: 2024-01-26
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
## 2024-02-01 ENCOUNTER — HOSPITAL ENCOUNTER (OUTPATIENT)
Dept: SLEEP CENTER | Age: 74
Discharge: HOME | End: 2024-02-01
Payer: MEDICARE

## 2024-02-01 DIAGNOSIS — F01.A0 MILD VASCULAR DEMENTIA WITHOUT BEHAVIORAL DISTURBANCE, PSYCHOTIC DISTURBANCE, MOOD DISTURBANCE, OR ANXIETY (HCC): ICD-10-CM

## 2024-02-01 PROCEDURE — 95819 EEG AWAKE AND ASLEEP: CPT

## 2024-02-02 ENCOUNTER — HOSPITAL ENCOUNTER (OUTPATIENT)
Dept: MRI IMAGING | Age: 74
Discharge: HOME | End: 2024-02-02
Attending: STUDENT IN AN ORGANIZED HEALTH CARE EDUCATION/TRAINING PROGRAM
Payer: MEDICARE

## 2024-02-02 DIAGNOSIS — F01.A0 MILD VASCULAR DEMENTIA WITHOUT BEHAVIORAL DISTURBANCE, PSYCHOTIC DISTURBANCE, MOOD DISTURBANCE, OR ANXIETY (HCC): ICD-10-CM

## 2024-02-02 PROCEDURE — 70551 MRI BRAIN STEM W/O DYE: CPT

## 2024-02-05 NOTE — TELEPHONE ENCOUNTER
Patient left message requesting a refill for hydrea to be sent to [facility] mail order pharmacy. Pending RX to Provider to be sent to pharmacy.

## 2024-02-06 RX ORDER — HYDROXYUREA 500 MG/1
1000 CAPSULE ORAL DAILY
Qty: 60 CAPSULE | Refills: 5 | Status: SHIPPED | OUTPATIENT
Start: 2024-02-06 | End: 2024-08-04

## 2024-02-26 DIAGNOSIS — F01.A0 MILD VASCULAR DEMENTIA WITHOUT BEHAVIORAL DISTURBANCE, PSYCHOTIC DISTURBANCE, MOOD DISTURBANCE, OR ANXIETY (HCC): ICD-10-CM

## 2024-02-26 RX ORDER — DONEPEZIL HYDROCHLORIDE 5 MG/1
TABLET, FILM COATED ORAL
Qty: 45 TABLET | Refills: 0 | OUTPATIENT
Start: 2024-02-26

## 2024-03-12 DIAGNOSIS — G40.89 OTHER SEIZURES (HCC): Primary | ICD-10-CM

## 2024-03-21 NOTE — PROGRESS NOTES
AMBULATORY ELECTROENCEPHALOGRAM REPORT     Identifying Information:  Name: Cathleen Cartagena  MRN: [id number]  : 1950  Interpreting Physician: Anastasiya Machado DO  Referring Provider: Lacie Mac DO     Clinical History:  Cathleen Cartagena is an 73 y.o. female with concerns for seizure like activity.      Past Medical History:  Past Medical History:   Diagnosis Date    Cancer (HCC)     Coronary artery disease involving native coronary artery of native heart with angina pectoris (HCC) 10/22/2015    Depression     GERD (gastroesophageal reflux disease)     Hyperlipidemia     Insomnia     Polycythemia     Thyroid disease     TIA (transient ischemic attack)         Current Medications:   Current Outpatient Medications   Medication Instructions    acetaminophen (TYLENOL) 650 mg, Oral, EVERY 6 HOURS PRN    amLODIPine (NORVASC) 5 MG tablet Take 1 tablet by mouth daily    aspirin 81 mg, Oral, DAILY    atorvastatin (LIPITOR) 40 mg, Oral, DAILY    Blood Pressure Monitoring (3 SERIES BP MONITOR/UPPER ARM) OMID Brand per insurance    carvedilol (COREG) 6.25 mg, Oral, 2 TIMES DAILY    donepezil (ARICEPT) 5 MG tablet Take 1/2 tablet for 5 days, then increase to 1 tablet for 5 days, then increase to 1 and 1/2 tablets thereafter    donepezil (ARICEPT) 10 mg, Oral, DAILY, NOTE TO PHARMACY: DO NOT FILL UNTIL 5 MG RX IS COMPLETE    DULoxetine (CYMBALTA) 30 MG extended release capsule No dose, route, or frequency recorded.    Esomeprazole Magnesium (NEXIUM PO) 1 tablet, Oral, NIGHTLY    folic acid (FOLVITE) 2 mg, Oral, DAILY    hydroxyurea (HYDREA) 1,000 mg, Oral, DAILY    levothyroxine (SYNTHROID) 75 mcg, Oral, DAILY    losartan (COZAAR) 100 mg, Oral, DAILY    Melatonin 10 MG TABS Oral    QUEtiapine (SEROQUEL) 25 mg, Oral, DAILY    XARELTO 20 MG TABS tablet 1 tablet, Oral, DAILY        aEEG start date and time: 3/12/24 at 1158   aEEG end date and time: 3/14/24 at 0925      Technical Summary:  20 channels of EEG were recorded in

## 2024-03-25 ENCOUNTER — HOSPITAL ENCOUNTER (OUTPATIENT)
Dept: INFUSION THERAPY | Age: 74
Discharge: HOME | End: 2024-03-25
Payer: MEDICARE

## 2024-03-25 ENCOUNTER — TELEPHONE (OUTPATIENT)
Dept: ONCOLOGY | Age: 74
End: 2024-03-25

## 2024-03-25 DIAGNOSIS — C44.92 SQUAMOUS CELL CARCINOMA OF SKIN, UNSPECIFIED: ICD-10-CM

## 2024-03-25 DIAGNOSIS — D45 POLYCYTHEMIA VERA (HCC): ICD-10-CM

## 2024-03-25 DIAGNOSIS — R63.4 WEIGHT LOSS: ICD-10-CM

## 2024-03-25 LAB
ALBUMIN SERPL-MCNC: 4.1 GM/DL (ref 3.4–5)
ALP BLD-CCNC: 108 IU/L (ref 40–129)
ALT SERPL-CCNC: 6 U/L (ref 10–40)
ANION GAP SERPL CALCULATED.3IONS-SCNC: 11 MMOL/L (ref 7–16)
AST SERPL-CCNC: 7 IU/L (ref 15–37)
BASOPHILS ABSOLUTE: 0 K/CU MM
BASOPHILS RELATIVE PERCENT: 0.7 % (ref 0–1)
BILIRUB SERPL-MCNC: 0.7 MG/DL (ref 0–1)
BUN SERPL-MCNC: 13 MG/DL (ref 6–23)
CALCIUM SERPL-MCNC: 9.2 MG/DL (ref 8.3–10.6)
CHLORIDE BLD-SCNC: 104 MMOL/L (ref 99–110)
CO2: 28 MMOL/L (ref 21–32)
CREAT SERPL-MCNC: 0.9 MG/DL (ref 0.6–1.1)
DIFFERENTIAL TYPE: ABNORMAL
EOSINOPHILS ABSOLUTE: 0.1 K/CU MM
EOSINOPHILS RELATIVE PERCENT: 1.2 % (ref 0–3)
GFR SERPL CREATININE-BSD FRML MDRD: 67 ML/MIN/1.73M2
GLUCOSE SERPL-MCNC: 113 MG/DL (ref 70–99)
HCT VFR BLD CALC: 31.3 % (ref 37–47)
HEMOGLOBIN: 10.4 GM/DL (ref 12.5–16)
LACTATE DEHYDROGENASE: 149 IU/L (ref 120–246)
LYMPHOCYTES ABSOLUTE: 1.6 K/CU MM
LYMPHOCYTES RELATIVE PERCENT: 40.2 % (ref 24–44)
MCH RBC QN AUTO: 40.8 PG (ref 27–31)
MCHC RBC AUTO-ENTMCNC: 33.2 % (ref 32–36)
MCV RBC AUTO: 122.7 FL (ref 78–100)
MONOCYTES ABSOLUTE: 0.1 K/CU MM
MONOCYTES RELATIVE PERCENT: 2.5 % (ref 0–4)
PDW BLD-RTO: 22.2 % (ref 11.7–14.9)
PLATELET # BLD: 178 K/CU MM (ref 140–440)
PMV BLD AUTO: 9.5 FL (ref 7.5–11.1)
POTASSIUM SERPL-SCNC: 4 MMOL/L (ref 3.5–5.1)
RBC # BLD: 2.55 M/CU MM (ref 4.2–5.4)
SEGMENTED NEUTROPHILS ABSOLUTE COUNT: 2.2 K/CU MM
SEGMENTED NEUTROPHILS RELATIVE PERCENT: 55.4 % (ref 36–66)
SODIUM BLD-SCNC: 143 MMOL/L (ref 135–145)
TOTAL PROTEIN: 6.5 GM/DL (ref 6.4–8.2)
WBC # BLD: 4 K/CU MM (ref 4–10.5)

## 2024-03-25 PROCEDURE — 83615 LACTATE (LD) (LDH) ENZYME: CPT

## 2024-03-25 PROCEDURE — 80053 COMPREHEN METABOLIC PANEL: CPT

## 2024-03-25 PROCEDURE — 36415 COLL VENOUS BLD VENIPUNCTURE: CPT

## 2024-03-25 PROCEDURE — 85025 COMPLETE CBC W/AUTO DIFF WBC: CPT

## 2024-04-03 ENCOUNTER — HOSPITAL ENCOUNTER (OUTPATIENT)
Dept: INFUSION THERAPY | Age: 74
Discharge: HOME | End: 2024-04-03
Payer: MEDICARE

## 2024-04-03 ENCOUNTER — OFFICE VISIT (OUTPATIENT)
Dept: ONCOLOGY | Age: 74
End: 2024-04-03
Payer: MEDICARE

## 2024-04-03 VITALS
SYSTOLIC BLOOD PRESSURE: 162 MMHG | HEART RATE: 71 BPM | WEIGHT: 127 LBS | HEIGHT: 63 IN | BODY MASS INDEX: 22.5 KG/M2 | DIASTOLIC BLOOD PRESSURE: 80 MMHG | TEMPERATURE: 97.9 F | OXYGEN SATURATION: 94 %

## 2024-04-03 DIAGNOSIS — C44.92 SQUAMOUS CELL CARCINOMA OF SKIN, UNSPECIFIED: ICD-10-CM

## 2024-04-03 DIAGNOSIS — D45 POLYCYTHEMIA VERA (HCC): Primary | ICD-10-CM

## 2024-04-03 PROCEDURE — 1036F TOBACCO NON-USER: CPT | Performed by: INTERNAL MEDICINE

## 2024-04-03 PROCEDURE — G8427 DOCREV CUR MEDS BY ELIG CLIN: HCPCS | Performed by: INTERNAL MEDICINE

## 2024-04-03 PROCEDURE — G8399 PT W/DXA RESULTS DOCUMENT: HCPCS | Performed by: INTERNAL MEDICINE

## 2024-04-03 PROCEDURE — 3017F COLORECTAL CA SCREEN DOC REV: CPT | Performed by: INTERNAL MEDICINE

## 2024-04-03 PROCEDURE — 1090F PRES/ABSN URINE INCON ASSESS: CPT | Performed by: INTERNAL MEDICINE

## 2024-04-03 PROCEDURE — 99214 OFFICE O/P EST MOD 30 MIN: CPT | Performed by: INTERNAL MEDICINE

## 2024-04-03 PROCEDURE — 1123F ACP DISCUSS/DSCN MKR DOCD: CPT | Performed by: INTERNAL MEDICINE

## 2024-04-03 PROCEDURE — G8420 CALC BMI NORM PARAMETERS: HCPCS | Performed by: INTERNAL MEDICINE

## 2024-04-03 PROCEDURE — 99211 OFF/OP EST MAY X REQ PHY/QHP: CPT

## 2024-04-03 RX ORDER — HYDROXYUREA 500 MG/1
1000 CAPSULE ORAL DAILY
Qty: 180 CAPSULE | Refills: 5 | Status: SHIPPED | OUTPATIENT
Start: 2024-04-03 | End: 2025-09-25

## 2024-04-03 ASSESSMENT — PATIENT HEALTH QUESTIONNAIRE - PHQ9
SUM OF ALL RESPONSES TO PHQ QUESTIONS 1-9: 0
SUM OF ALL RESPONSES TO PHQ9 QUESTIONS 1 & 2: 0
2. FEELING DOWN, DEPRESSED OR HOPELESS: NOT AT ALL
1. LITTLE INTEREST OR PLEASURE IN DOING THINGS: NOT AT ALL
SUM OF ALL RESPONSES TO PHQ QUESTIONS 1-9: 0

## 2024-04-03 NOTE — PROGRESS NOTES
MA Rooming Questions  Patient: Cathleen Cartagena  MRN: [id number]    Date: 4/3/2024        1. Do you have any new issues?   yes - Patient states nose is constantly running and does not understand why.          2. Do you need any refills on medications?    no    3. Have you had any imaging done since your last visit?   yes - MRI Brain2/5; CT chest and Abd 1/22    4. Have you been hospitalized or seen in the emergency room since your last visit here?   no    5. Did the patient have a depression screening completed today? Yes    PHQ-9 Total Score: 0 (4/3/2024  3:28 PM)       PHQ-9 Given to (if applicable):               PHQ-9 Score (if applicable):                     [] Positive     []  Negative              Does question #9 need addressed (if applicable)                     [] Yes    []  No               Francisca Sung MA    
Plan:  PVera/Low ferritin: Low Erythropoetin and JAK2 + suggestive of polycythemia vera. Started Hydrea jan 2018, as H/O thrombosis(needing CABG) and also Possible TIA. leukocytosis in may 2019 was most probably sec to steroids, Flow at that point with left shift but otherwise normal.  Hydrea being adjusted according to the counts, currently taking 500 mg/day.  May 2023 CBC WBC of 23.8 hemoglobin of 16.7 hematocrit 59.4 MCV of 80.6 and platelets of 467.  LDH was elevated at 390 in April 2023.  Ferritin 77, B12 1558.  Recommended that she increases the Hydrea to 1000 mg daily.  Recommend one-time phlebotomy.  Continue folic acid.    Discontinued oral iron   Repeat CBC August 2023 WBC 6.3 hemoglobin of 14.1 hematocrit 45.2 MCV of 98 and platelets of 244LDH 199  Ferritin 373 and iron sats 69%  Continue Hydrea 1000 mg/day and daily folic acid.  No indication for any phlebotomy.  9/21/23:CBC with wbc 5.2, Hb 12, hct 36.7, mcv 118, platelets 378, ferritin 296 and iron sats 91.  Continue Hydrea 1000mg daily, Continue folic acid and monitor cbc closely.  No indication for phlebotomy.  Discussed further evaluation with possible BMB as ferritin and iron sats increasing and anemia, to r/o MDS/MF. She deferred  Jan 2024 cbc with wbc 6.9, hb 13.9, plt 347, mcv 111  Continue hydrea 1000mg daily, asa 81 mg and xarelto 20 mg daily  Monitor for any bleeding  March 2024, hemoglobin 10.4 platelets 178.  Recommend reduction of Hydrea dose to 500 mg once a day.  Will repeat labs including iron profile and ferritin in a month for further recommendations.    Weight loss: Pan CT was ordered in January 2024, with no acute abnormality.  But splenomegaly.  I would recommend her to follow-up with endocrinology.    Forgetfulness, he is being followed by neurology.  Has been started on donepezil.  Runny nose, drooling could be related to that.  Recommend that she follows with neurology as well.    Parathyroid adenoma, s/p parathyroidectomy may

## 2024-04-04 ENCOUNTER — TELEPHONE (OUTPATIENT)
Dept: NEUROLOGY | Age: 74
End: 2024-04-04

## 2024-04-04 NOTE — TELEPHONE ENCOUNTER
Received call from pt stating she believes she's having side effects with donepezil. She stated she's been having issues with watering eyes, runny nose, drooling, excessive perspiration, bowel incontinence, and weight loss. Pt last seen 1/26/24. Inquired when symptoms began and pt stated they began shortly after starting medication but have worsened recently, unable to provide timeline when these symptoms worsened. She is inquiring if she should decrease or stop medication. She is scheduled for follow up 4/11/24. Please advise.

## 2024-04-11 ENCOUNTER — OFFICE VISIT (OUTPATIENT)
Dept: NEUROLOGY | Age: 74
End: 2024-04-11
Payer: MEDICARE

## 2024-04-11 VITALS
DIASTOLIC BLOOD PRESSURE: 74 MMHG | OXYGEN SATURATION: 97 % | SYSTOLIC BLOOD PRESSURE: 142 MMHG | BODY MASS INDEX: 22.32 KG/M2 | WEIGHT: 126 LBS | HEART RATE: 54 BPM

## 2024-04-11 DIAGNOSIS — F01.A0 MILD VASCULAR DEMENTIA WITHOUT BEHAVIORAL DISTURBANCE, PSYCHOTIC DISTURBANCE, MOOD DISTURBANCE, OR ANXIETY (HCC): Primary | ICD-10-CM

## 2024-04-11 PROCEDURE — G8427 DOCREV CUR MEDS BY ELIG CLIN: HCPCS | Performed by: NURSE PRACTITIONER

## 2024-04-11 PROCEDURE — 3017F COLORECTAL CA SCREEN DOC REV: CPT | Performed by: NURSE PRACTITIONER

## 2024-04-11 PROCEDURE — 1123F ACP DISCUSS/DSCN MKR DOCD: CPT | Performed by: NURSE PRACTITIONER

## 2024-04-11 PROCEDURE — 1090F PRES/ABSN URINE INCON ASSESS: CPT | Performed by: NURSE PRACTITIONER

## 2024-04-11 PROCEDURE — G8399 PT W/DXA RESULTS DOCUMENT: HCPCS | Performed by: NURSE PRACTITIONER

## 2024-04-11 PROCEDURE — 1036F TOBACCO NON-USER: CPT | Performed by: NURSE PRACTITIONER

## 2024-04-11 PROCEDURE — G8420 CALC BMI NORM PARAMETERS: HCPCS | Performed by: NURSE PRACTITIONER

## 2024-04-11 PROCEDURE — 99214 OFFICE O/P EST MOD 30 MIN: CPT | Performed by: NURSE PRACTITIONER

## 2024-04-11 NOTE — PROGRESS NOTES
capsule 5    levothyroxine (SYNTHROID) 75 MCG tablet Take 1 tablet by mouth Daily      Melatonin 10 MG TABS Take by mouth      folic acid (FOLVITE) 1 MG tablet Take 2 tablets by mouth daily 180 tablet 1    Blood Pressure Monitoring (3 SERIES BP MONITOR/UPPER ARM) OMID Brand per insurance 1 each 0    DULoxetine (CYMBALTA) 30 MG extended release capsule       acetaminophen (TYLENOL) 325 MG tablet Take 2 tablets by mouth every 6 hours as needed for Pain 120 tablet 3    losartan (COZAAR) 100 MG tablet Take 1 tablet by mouth daily 90 tablet 1    QUEtiapine (SEROQUEL) 25 MG tablet Take 1 tablet by mouth daily      amLODIPine (NORVASC) 5 MG tablet Take 1 tablet by mouth daily      XARELTO 20 MG TABS tablet Take 1 tablet by mouth daily      atorvastatin (LIPITOR) 40 MG tablet Take 1 tablet by mouth daily      Esomeprazole Magnesium (NEXIUM PO) Take 1 tablet by mouth nightly      carvedilol (COREG) 6.25 MG tablet Take 1 tablet by mouth 2 times daily (Patient taking differently: Take 1 tablet by mouth daily) 60 tablet 3    hydroxyurea (HYDREA) 500 MG chemo capsule Take 2 capsules by mouth daily (Patient not taking: Reported on 4/11/2024) 180 capsule 5    donepezil (ARICEPT) 5 MG tablet Take 1/2 tablet for 5 days, then increase to 1 tablet for 5 days, then increase to 1 and 1/2 tablets thereafter (Patient not taking: Reported on 4/11/2024) 45 tablet 0    donepezil (ARICEPT) 10 MG tablet Take 1 tablet by mouth daily NOTE TO PHARMACY: DO NOT FILL UNTIL 5 MG RX IS COMPLETE (Patient not taking: Reported on 4/11/2024) 30 tablet 5    aspirin 81 MG EC tablet Take 1 tablet by mouth daily (Patient not taking: Reported on 4/11/2024)       No current facility-administered medications for this visit.       Physical Exam:  Also present during visit: daughter.  Mental Status: Alert, conversational, NAD, speech clear, language fluent     Cranial Nerve Exam:   CN II-XII: Pupils equal and round,  no nystagmus, no apparent gaze paresis,

## 2024-05-08 ENCOUNTER — HOSPITAL ENCOUNTER (OUTPATIENT)
Dept: INFUSION THERAPY | Age: 74
Discharge: HOME | End: 2024-05-08
Payer: MEDICARE

## 2024-05-08 ENCOUNTER — OFFICE VISIT (OUTPATIENT)
Dept: ONCOLOGY | Age: 74
End: 2024-05-08
Payer: MEDICARE

## 2024-05-08 VITALS
HEIGHT: 63 IN | HEART RATE: 81 BPM | BODY MASS INDEX: 22.68 KG/M2 | TEMPERATURE: 97.2 F | WEIGHT: 128 LBS | DIASTOLIC BLOOD PRESSURE: 74 MMHG | OXYGEN SATURATION: 93 % | SYSTOLIC BLOOD PRESSURE: 134 MMHG

## 2024-05-08 DIAGNOSIS — R63.4 WEIGHT LOSS: ICD-10-CM

## 2024-05-08 DIAGNOSIS — D45 POLYCYTHEMIA VERA (HCC): Primary | ICD-10-CM

## 2024-05-08 DIAGNOSIS — C44.92 SQUAMOUS CELL CARCINOMA OF SKIN, UNSPECIFIED: ICD-10-CM

## 2024-05-08 PROCEDURE — 99213 OFFICE O/P EST LOW 20 MIN: CPT | Performed by: INTERNAL MEDICINE

## 2024-05-08 PROCEDURE — 1036F TOBACCO NON-USER: CPT | Performed by: INTERNAL MEDICINE

## 2024-05-08 PROCEDURE — 1123F ACP DISCUSS/DSCN MKR DOCD: CPT | Performed by: INTERNAL MEDICINE

## 2024-05-08 PROCEDURE — 3017F COLORECTAL CA SCREEN DOC REV: CPT | Performed by: INTERNAL MEDICINE

## 2024-05-08 PROCEDURE — G8420 CALC BMI NORM PARAMETERS: HCPCS | Performed by: INTERNAL MEDICINE

## 2024-05-08 PROCEDURE — 1090F PRES/ABSN URINE INCON ASSESS: CPT | Performed by: INTERNAL MEDICINE

## 2024-05-08 PROCEDURE — G8399 PT W/DXA RESULTS DOCUMENT: HCPCS | Performed by: INTERNAL MEDICINE

## 2024-05-08 PROCEDURE — G8427 DOCREV CUR MEDS BY ELIG CLIN: HCPCS | Performed by: INTERNAL MEDICINE

## 2024-05-08 PROCEDURE — 99211 OFF/OP EST MAY X REQ PHY/QHP: CPT

## 2024-05-08 NOTE — PROGRESS NOTES
Patient Name: Cathleen Cartagena  Patient : 1950  Patient MRN: [id number]     Primary Oncologist: Jolanta Bello MD  PCP: Dr Bergman        Date of Service: 2024      Chief Complaint:   Chief Complaint   Patient presents with    Follow-up    Results        Active Problem list  1. Polycythemia vera (HCC)    2. Squamous cell carcinoma of skin, unspecified    3. Weight loss           HPI:        Referred in 2016 for polycythemia, CBC with wbc 13.6, Hb 14.4 hct 47.8, plt 513K  16 Jak2 postive  Was started on Hydrea as h/o MI needing CABG and also h/o TIA x 2, dose adjusted as needed.   Then with [facility]AILA, had EGD, Cscope and VCE in may 2016  Cscope with diverticulosis and nonbleeding internal hemorrhoids  EGD with erythematous stomach but biopsy neg for h.pylori or malignancy  Capsule endoscopy with apthous ulcers in the small bowel.     18: Hydrea 1000mg OD    2018: Ct abdomen and pelvis:Impression  No renal or ureteral stone. No bladder stone.    Distal left ureter does not completely opacify but otherwise appears  unremarkable. Otherwise no filling defect within the renal or ureteral  collecting systems.    Mild irregularity along the posterior bladder wall which may be related to  ureterovesical junction bilaterally. Recommend further evaluation with  cystoscopy given hematuria.      19: CBC with wbc 11, Hb 20.2, hct 60, mcv 105, plt 171K  Creatinine 1.06, alk [id number]  ldh 296    3/12/19:CBC with wbc 11.9, Hb 15.6, hct 49.2, mcv 106, plt 384K  Ferritin 9  sats 7    2020 LDH of 149 CMP with sodium 138 potassium 4.6 glucose of 37 creatinine 1 calcium 10.3 ALT 8 AST 10 alk phos 115 CBC with WBC 11.7 hemoglobin 13.1 hematocrit 47.7 MCV of 101.7 platelets of 444      2020 EGD with Possible barrettes but biopsy negative     Had phlebotomy  and 2021 ASA and hydrea were held sec to cytopenia    21: Presented to ER with left jaw swelling and pain,which started 21. No

## 2024-05-08 NOTE — PROGRESS NOTES
MA Rooming Questions  Patient: Cathleen Cartagena  MRN: [id number]    Date: 5/8/2024        1. Do you have any new issues?   no         2. Do you need any refills on medications?    no    3. Have you had any imaging done since your last visit?   yes - labs @ [facility]    4. Have you been hospitalized or seen in the emergency room since your last visit here?   no    5. Did the patient have a depression screening completed today? No    No data recorded     PHQ-9 Given to (if applicable):               PHQ-9 Score (if applicable):                     [] Positive     []  Negative              Does question #9 need addressed (if applicable)                     [] Yes    []  No               Natalie Comer CMA

## 2024-08-15 ENCOUNTER — HOSPITAL ENCOUNTER (OUTPATIENT)
Dept: INFUSION THERAPY | Age: 74
Discharge: HOME | End: 2024-08-15
Payer: MEDICARE

## 2024-08-15 ENCOUNTER — OFFICE VISIT (OUTPATIENT)
Dept: ONCOLOGY | Age: 74
End: 2024-08-15
Payer: MEDICARE

## 2024-08-15 VITALS
BODY MASS INDEX: 23.5 KG/M2 | RESPIRATION RATE: 16 BRPM | WEIGHT: 132.6 LBS | OXYGEN SATURATION: 94 % | HEART RATE: 84 BPM | DIASTOLIC BLOOD PRESSURE: 76 MMHG | TEMPERATURE: 97.7 F | HEIGHT: 63 IN | SYSTOLIC BLOOD PRESSURE: 143 MMHG

## 2024-08-15 DIAGNOSIS — K90.9 INTESTINAL MALABSORPTION, UNSPECIFIED TYPE: ICD-10-CM

## 2024-08-15 DIAGNOSIS — D50.9 IRON DEFICIENCY ANEMIA, UNSPECIFIED IRON DEFICIENCY ANEMIA TYPE: ICD-10-CM

## 2024-08-15 DIAGNOSIS — D45 POLYCYTHEMIA VERA (HCC): Primary | ICD-10-CM

## 2024-08-15 DIAGNOSIS — E53.8 B12 DEFICIENCY: ICD-10-CM

## 2024-08-15 PROBLEM — D50.0 IRON DEFICIENCY ANEMIA SECONDARY TO BLOOD LOSS (CHRONIC): Status: ACTIVE | Noted: 2020-08-19

## 2024-08-15 PROCEDURE — 1036F TOBACCO NON-USER: CPT | Performed by: INTERNAL MEDICINE

## 2024-08-15 PROCEDURE — 99211 OFF/OP EST MAY X REQ PHY/QHP: CPT

## 2024-08-15 PROCEDURE — 1123F ACP DISCUSS/DSCN MKR DOCD: CPT | Performed by: INTERNAL MEDICINE

## 2024-08-15 PROCEDURE — G8399 PT W/DXA RESULTS DOCUMENT: HCPCS | Performed by: INTERNAL MEDICINE

## 2024-08-15 PROCEDURE — 3017F COLORECTAL CA SCREEN DOC REV: CPT | Performed by: INTERNAL MEDICINE

## 2024-08-15 PROCEDURE — G8427 DOCREV CUR MEDS BY ELIG CLIN: HCPCS | Performed by: INTERNAL MEDICINE

## 2024-08-15 PROCEDURE — 1090F PRES/ABSN URINE INCON ASSESS: CPT | Performed by: INTERNAL MEDICINE

## 2024-08-15 PROCEDURE — G8420 CALC BMI NORM PARAMETERS: HCPCS | Performed by: INTERNAL MEDICINE

## 2024-08-15 PROCEDURE — 99214 OFFICE O/P EST MOD 30 MIN: CPT | Performed by: INTERNAL MEDICINE

## 2024-08-15 RX ORDER — FAMOTIDINE 10 MG/ML
20 INJECTION, SOLUTION INTRAVENOUS
OUTPATIENT
Start: 2024-08-15

## 2024-08-15 RX ORDER — ACETAMINOPHEN 325 MG/1
650 TABLET ORAL ONCE
OUTPATIENT
Start: 2024-08-15 | End: 2024-08-15

## 2024-08-15 RX ORDER — SODIUM CHLORIDE 9 MG/ML
5-250 INJECTION, SOLUTION INTRAVENOUS PRN
OUTPATIENT
Start: 2024-08-15

## 2024-08-15 RX ORDER — SODIUM CHLORIDE 9 MG/ML
INJECTION, SOLUTION INTRAVENOUS CONTINUOUS
OUTPATIENT
Start: 2024-08-15

## 2024-08-15 RX ORDER — 0.9 % SODIUM CHLORIDE 0.9 %
250 INTRAVENOUS SOLUTION INTRAVENOUS ONCE
OUTPATIENT
Start: 2024-08-15 | End: 2024-08-15

## 2024-08-15 RX ORDER — ONDANSETRON 2 MG/ML
8 INJECTION INTRAMUSCULAR; INTRAVENOUS
OUTPATIENT
Start: 2024-08-15

## 2024-08-15 RX ORDER — ACETAMINOPHEN 325 MG/1
650 TABLET ORAL
OUTPATIENT
Start: 2024-08-15

## 2024-08-15 RX ORDER — HEPARIN SODIUM (PORCINE) LOCK FLUSH IV SOLN 100 UNIT/ML 100 UNIT/ML
500 SOLUTION INTRAVENOUS PRN
OUTPATIENT
Start: 2024-08-15

## 2024-08-15 RX ORDER — SODIUM CHLORIDE 0.9 % (FLUSH) 0.9 %
5-40 SYRINGE (ML) INJECTION PRN
OUTPATIENT
Start: 2024-08-15

## 2024-08-15 RX ORDER — DIPHENHYDRAMINE HYDROCHLORIDE 50 MG/ML
50 INJECTION INTRAMUSCULAR; INTRAVENOUS
OUTPATIENT
Start: 2024-08-15

## 2024-08-15 RX ORDER — EPINEPHRINE 1 MG/ML
0.3 INJECTION, SOLUTION, CONCENTRATE INTRAVENOUS PRN
OUTPATIENT
Start: 2024-08-15

## 2024-08-15 RX ORDER — ALBUTEROL SULFATE 90 UG/1
4 AEROSOL, METERED RESPIRATORY (INHALATION) PRN
OUTPATIENT
Start: 2024-08-15

## 2024-08-15 NOTE — PROGRESS NOTES
Patient Name: Cathleen Cartagena  Patient : 1950  Patient MRN: [id number]     Primary Oncologist: Jolanta Bello MD  PCP: Dr Bergman        Date of Service: 8/15/2024      Chief Complaint:   Chief Complaint   Patient presents with    Follow-up        Active Problem list  1. Polycythemia vera (HCC)    2. B12 deficiency    3. Intestinal malabsorption, unspecified type    4. Iron deficiency anemia, unspecified iron deficiency anemia type           HPI:        Referred in 2016 for polycythemia, CBC with wbc 13.6, Hb 14.4 hct 47.8, plt 513K  16 Jak2 postive  Was started on Hydrea as h/o MI needing CABG and also h/o TIA x 2, dose adjusted as needed.   Then with [facility]AILA, had EGD, Cscope and VCE in may 2016  Cscope with diverticulosis and nonbleeding internal hemorrhoids  EGD with erythematous stomach but biopsy neg for h.pylori or malignancy  Capsule endoscopy with apthous ulcers in the small bowel.     18: Hydrea 1000mg OD    2018: Ct abdomen and pelvis:Impression  No renal or ureteral stone. No bladder stone.    Distal left ureter does not completely opacify but otherwise appears  unremarkable. Otherwise no filling defect within the renal or ureteral  collecting systems.    Mild irregularity along the posterior bladder wall which may be related to  ureterovesical junction bilaterally. Recommend further evaluation with  cystoscopy given hematuria.      19: CBC with wbc 11, Hb 20.2, hct 60, mcv 105, plt 171K  Creatinine 1.06, alk [id number]  ldh 296    3/12/19:CBC with wbc 11.9, Hb 15.6, hct 49.2, mcv 106, plt 384K  Ferritin 9  sats 7    2020 LDH of 149 CMP with sodium 138 potassium 4.6 glucose of 37 creatinine 1 calcium 10.3 ALT 8 AST 10 alk phos 115 CBC with WBC 11.7 hemoglobin 13.1 hematocrit 47.7 MCV of 101.7 platelets of 444      2020 EGD with Possible barrettes but biopsy negative     Had phlebotomy  and 2021 ASA and hydrea were held sec to cytopenia    21: Presented to

## 2024-08-15 NOTE — PROGRESS NOTES
MA Rooming Questions  Patient: Cathleen Cartagena  MRN: [id number]    Date: 8/15/2024        1. Do you have any new issues?   no         2. Do you need any refills on medications?    no    3. Have you had any imaging done since your last visit?   no    4. Have you been hospitalized or seen in the emergency room since your last visit here?   no    5. Did the patient have a depression screening completed today? No    No data recorded     PHQ-9 Given to (if applicable):               PHQ-9 Score (if applicable):                     [] Positive     []  Negative              Does question #9 need addressed (if applicable)                     [] Yes    []  No               Jasmyn Hook CMA

## 2024-08-15 NOTE — PROGRESS NOTES
Order placed for therapeutic phlebotomy: withdraw 500 mL blood x1 per physician instruction. Therapy plan added. Patient will be contacted with appointment time once scheduled at [facility] OP infusion.      Order also placed for Infed 1,000 mg x1. NN referral placed. Patient to be scheduled for iron infusion after phlebotomy.

## 2024-08-19 ENCOUNTER — TELEPHONE (OUTPATIENT)
Dept: INFUSION THERAPY | Age: 74
End: 2024-08-19

## 2024-08-19 NOTE — TELEPHONE ENCOUNTER
Called patient to schedule iron infusion, patient is scheduled for appt. Advised pt to stop oral iron one day before infusion start date and can resume after iron infusions are complete. Advised pt to notify us if they are unable to keep their scheduled appt time.

## 2024-08-21 ENCOUNTER — CLINICAL DOCUMENTATION (OUTPATIENT)
Dept: ONCOLOGY | Age: 74
End: 2024-08-21

## 2024-08-21 NOTE — PROGRESS NOTES
Physician recommending to repeat labs in 4 weeks.last lab draw 08/15/2024. Will place order for CBC and contact patient with appointment time once lab draw scheduled.

## 2024-08-30 ENCOUNTER — CLINICAL DOCUMENTATION (OUTPATIENT)
Dept: ONCOLOGY | Age: 74
End: 2024-08-30

## 2024-08-30 ENCOUNTER — PREP FOR PROCEDURE (OUTPATIENT)
Dept: ONCOLOGY | Age: 74
End: 2024-08-30

## 2024-08-30 ENCOUNTER — HOSPITAL ENCOUNTER (OUTPATIENT)
Dept: INFUSION THERAPY | Age: 74
Discharge: HOME | End: 2024-08-30
Payer: MEDICARE

## 2024-08-30 VITALS
HEART RATE: 81 BPM | TEMPERATURE: 97.5 F | WEIGHT: 134 LBS | HEIGHT: 63 IN | DIASTOLIC BLOOD PRESSURE: 67 MMHG | SYSTOLIC BLOOD PRESSURE: 133 MMHG | RESPIRATION RATE: 16 BRPM | OXYGEN SATURATION: 95 % | BODY MASS INDEX: 23.74 KG/M2

## 2024-08-30 DIAGNOSIS — D50.0 IRON DEFICIENCY ANEMIA SECONDARY TO BLOOD LOSS (CHRONIC): ICD-10-CM

## 2024-08-30 DIAGNOSIS — K90.9 INTESTINAL MALABSORPTION, UNSPECIFIED TYPE: Primary | ICD-10-CM

## 2024-08-30 PROCEDURE — 96365 THER/PROPH/DIAG IV INF INIT: CPT

## 2024-08-30 PROCEDURE — 96367 TX/PROPH/DG ADDL SEQ IV INF: CPT

## 2024-08-30 PROCEDURE — 96366 THER/PROPH/DIAG IV INF ADDON: CPT

## 2024-08-30 PROCEDURE — 6370000000 HC RX 637 (ALT 250 FOR IP): Performed by: INTERNAL MEDICINE

## 2024-08-30 PROCEDURE — 2580000003 HC RX 258: Performed by: INTERNAL MEDICINE

## 2024-08-30 PROCEDURE — 6360000002 HC RX W HCPCS: Performed by: INTERNAL MEDICINE

## 2024-08-30 RX ORDER — SODIUM CHLORIDE 0.9 % (FLUSH) 0.9 %
5-40 SYRINGE (ML) INJECTION PRN
OUTPATIENT
Start: 2024-08-30

## 2024-08-30 RX ORDER — SODIUM CHLORIDE 9 MG/ML
5-250 INJECTION, SOLUTION INTRAVENOUS PRN
Status: CANCELLED | OUTPATIENT
Start: 2024-08-30

## 2024-08-30 RX ORDER — ACETAMINOPHEN 325 MG/1
650 TABLET ORAL
OUTPATIENT
Start: 2024-08-30

## 2024-08-30 RX ORDER — SODIUM CHLORIDE 9 MG/ML
5-250 INJECTION, SOLUTION INTRAVENOUS PRN
OUTPATIENT
Start: 2024-08-30

## 2024-08-30 RX ORDER — ALBUTEROL SULFATE 90 UG/1
4 AEROSOL, METERED RESPIRATORY (INHALATION) PRN
OUTPATIENT
Start: 2024-08-30

## 2024-08-30 RX ORDER — SODIUM CHLORIDE 9 MG/ML
INJECTION, SOLUTION INTRAVENOUS CONTINUOUS
OUTPATIENT
Start: 2024-08-30

## 2024-08-30 RX ORDER — ACETAMINOPHEN 325 MG/1
650 TABLET ORAL ONCE
Status: CANCELLED | OUTPATIENT
Start: 2024-08-30 | End: 2024-08-30

## 2024-08-30 RX ORDER — DIPHENHYDRAMINE HYDROCHLORIDE 50 MG/ML
50 INJECTION INTRAMUSCULAR; INTRAVENOUS
OUTPATIENT
Start: 2024-08-30

## 2024-08-30 RX ORDER — HEPARIN 100 UNIT/ML
500 SYRINGE INTRAVENOUS PRN
OUTPATIENT
Start: 2024-08-30

## 2024-08-30 RX ORDER — ACETAMINOPHEN 325 MG/1
650 TABLET ORAL ONCE
Status: COMPLETED | OUTPATIENT
Start: 2024-08-30 | End: 2024-08-30

## 2024-08-30 RX ORDER — FAMOTIDINE 10 MG/ML
20 INJECTION, SOLUTION INTRAVENOUS
OUTPATIENT
Start: 2024-08-30

## 2024-08-30 RX ORDER — DEXAMETHASONE SODIUM PHOSPHATE 4 MG/ML
10 INJECTION, SOLUTION INTRA-ARTICULAR; INTRALESIONAL; INTRAMUSCULAR; INTRAVENOUS; SOFT TISSUE ONCE
Status: DISCONTINUED | OUTPATIENT
Start: 2024-08-30 | End: 2024-08-30 | Stop reason: SDUPTHER

## 2024-08-30 RX ORDER — SODIUM CHLORIDE 9 MG/ML
5-250 INJECTION, SOLUTION INTRAVENOUS PRN
Status: DISCONTINUED | OUTPATIENT
Start: 2024-08-30 | End: 2024-08-31 | Stop reason: HOSPADM

## 2024-08-30 RX ORDER — ONDANSETRON 2 MG/ML
8 INJECTION INTRAMUSCULAR; INTRAVENOUS
OUTPATIENT
Start: 2024-08-30

## 2024-08-30 RX ORDER — DEXAMETHASONE SODIUM PHOSPHATE 4 MG/ML
10 INJECTION, SOLUTION INTRA-ARTICULAR; INTRALESIONAL; INTRAMUSCULAR; INTRAVENOUS; SOFT TISSUE ONCE
Status: CANCELLED | OUTPATIENT
Start: 2024-08-30 | End: 2024-08-30

## 2024-08-30 RX ORDER — EPINEPHRINE 1 MG/ML
0.3 INJECTION, SOLUTION, CONCENTRATE INTRAVENOUS PRN
OUTPATIENT
Start: 2024-08-30

## 2024-08-30 RX ADMIN — SODIUM CHLORIDE 25 MG: 9 INJECTION, SOLUTION INTRAVENOUS at 09:33

## 2024-08-30 RX ADMIN — DEXAMETHASONE SODIUM PHOSPHATE 10 MG: 10 INJECTION INTRAMUSCULAR; INTRAVENOUS at 10:49

## 2024-08-30 RX ADMIN — SODIUM CHLORIDE 975 MG: 9 INJECTION, SOLUTION INTRAVENOUS at 11:14

## 2024-08-30 RX ADMIN — SODIUM CHLORIDE 20 ML/HR: 9 INJECTION, SOLUTION INTRAVENOUS at 09:31

## 2024-08-30 RX ADMIN — ACETAMINOPHEN 650 MG: 325 TABLET ORAL at 10:49

## 2024-08-30 NOTE — PROGRESS NOTES
Physician requesting to repeat CBC in 4 weeks from last labs on 08/15/2024. Patient added to lab schedule on 09/12/2024 @ 1000. Patient in chemo suite for iron infusion today 08/30/2024. Infusion RN to update patient.

## 2024-08-30 NOTE — PROGRESS NOTES
Ambulated to treatment suite for Infed Iron infusion. Patient has no concerns at this time. PIV placed in right forearm, blood return noted. Due for therapeutic phlebotomy, discussed with Stacey who called outpatient infusion and they will call her within the next week. Treatment approved and given. Call light within reach. Test dose given. 60 min post-infusion monitoring completed with no infusion reaction. Premedications given after test dose as ordered. Tolerated infusion without incident.  Discharge instructions provided.    RTC 09/12 for labs.

## 2024-09-20 ENCOUNTER — TELEPHONE (OUTPATIENT)
Dept: ONCOLOGY | Age: 74
End: 2024-09-20

## 2024-09-23 DIAGNOSIS — D45 POLYCYTHEMIA VERA (HCC): Primary | ICD-10-CM

## 2024-09-23 RX ORDER — 0.9 % SODIUM CHLORIDE 0.9 %
250 INTRAVENOUS SOLUTION INTRAVENOUS ONCE
OUTPATIENT
Start: 2024-09-23 | End: 2024-09-23

## 2024-09-25 ENCOUNTER — HOSPITAL ENCOUNTER (OUTPATIENT)
Dept: INFUSION THERAPY | Age: 74
Setting detail: INFUSION SERIES
Discharge: HOME | End: 2024-09-25
Payer: MEDICARE

## 2024-09-25 VITALS
OXYGEN SATURATION: 96 % | TEMPERATURE: 97.2 F | HEART RATE: 88 BPM | DIASTOLIC BLOOD PRESSURE: 74 MMHG | SYSTOLIC BLOOD PRESSURE: 125 MMHG | RESPIRATION RATE: 16 BRPM

## 2024-09-25 DIAGNOSIS — D45 POLYCYTHEMIA VERA (HCC): Primary | ICD-10-CM

## 2024-09-25 PROCEDURE — 99195 PHLEBOTOMY: CPT

## 2024-09-25 RX ORDER — 0.9 % SODIUM CHLORIDE 0.9 %
250 INTRAVENOUS SOLUTION INTRAVENOUS ONCE
Status: CANCELLED | OUTPATIENT
Start: 2024-09-25 | End: 2024-09-25

## 2024-09-25 NOTE — PROCEDURES
PROCEDURE NOTE  Date: 9/25/2024   Name: Cathleen Cartagena  YOB: 1950                 Pre-phlebotomy:  Pulse:  84 Blood Pressure:  143/75    Post-phlebotomy:  Pulse:  88 Blood Pressure:  125/74    Volume Removed:  565 grams    Complications:  none    Comments:  Tolerated well

## 2024-09-25 NOTE — DISCHARGE SUMMARY
Tolerated Phlebotomy well.Reviewed discharge instructions, understanding verbalized.Copies of AVS given to take home. Patient discharged home.Down to exit per self.    No orders of the defined types were placed in this encounter.

## 2024-09-25 NOTE — DISCHARGE INSTRUCTIONS
Discharge instructions:     *Do NOT skip meals today   *Drink PLENTY of liquids today, especially water   *Rest today   *Continue your medications as prescribed   *Return to your physician as planned    * If you feel a little lightheaded, lie down for a while,  and have some snacks.   * Take your time when standing up from a sitting or  lying position     If you feel a little lightheaded, sit or lie back down     Have a snack or have something to drink    Call your doctor now or seek immediate medical care if:   *You are dizzy or lightheaded or feel like you may faint >1 day   * You have signs of infection, such as:    Increased pain, swelling, warmth, or redness    Red streaks leading from the area    Pus draining from the area    A fever         Thank you for choosing [facility]. It is our pleasure to serve you    [facility]  Hours: 8:00-5:00  Phone: [phone]

## 2024-09-25 NOTE — PLAN OF CARE
Ambulatory to unit room 1 for Therapeutic Phlebotomy.Orientated to unit.Procedure and plan of care explained.Questions answered.Understanding verbalized.

## 2024-10-17 ENCOUNTER — OFFICE VISIT (OUTPATIENT)
Dept: ONCOLOGY | Age: 74
End: 2024-10-17
Payer: MEDICARE

## 2024-10-17 ENCOUNTER — HOSPITAL ENCOUNTER (OUTPATIENT)
Dept: INFUSION THERAPY | Age: 74
Discharge: HOME | End: 2024-10-17
Payer: MEDICARE

## 2024-10-17 VITALS
TEMPERATURE: 97 F | SYSTOLIC BLOOD PRESSURE: 133 MMHG | HEIGHT: 62 IN | DIASTOLIC BLOOD PRESSURE: 76 MMHG | OXYGEN SATURATION: 92 % | WEIGHT: 129.2 LBS | BODY MASS INDEX: 23.77 KG/M2 | HEART RATE: 84 BPM

## 2024-10-17 DIAGNOSIS — D45 POLYCYTHEMIA VERA (HCC): ICD-10-CM

## 2024-10-17 DIAGNOSIS — E53.8 B12 DEFICIENCY: Primary | ICD-10-CM

## 2024-10-17 DIAGNOSIS — D45 POLYCYTHEMIA VERA (HCC): Primary | ICD-10-CM

## 2024-10-17 PROCEDURE — G8427 DOCREV CUR MEDS BY ELIG CLIN: HCPCS | Performed by: INTERNAL MEDICINE

## 2024-10-17 PROCEDURE — G8484 FLU IMMUNIZE NO ADMIN: HCPCS | Performed by: INTERNAL MEDICINE

## 2024-10-17 PROCEDURE — G8399 PT W/DXA RESULTS DOCUMENT: HCPCS | Performed by: INTERNAL MEDICINE

## 2024-10-17 PROCEDURE — 3017F COLORECTAL CA SCREEN DOC REV: CPT | Performed by: INTERNAL MEDICINE

## 2024-10-17 PROCEDURE — 1036F TOBACCO NON-USER: CPT | Performed by: INTERNAL MEDICINE

## 2024-10-17 PROCEDURE — 1123F ACP DISCUSS/DSCN MKR DOCD: CPT | Performed by: INTERNAL MEDICINE

## 2024-10-17 PROCEDURE — 1090F PRES/ABSN URINE INCON ASSESS: CPT | Performed by: INTERNAL MEDICINE

## 2024-10-17 PROCEDURE — 99214 OFFICE O/P EST MOD 30 MIN: CPT | Performed by: INTERNAL MEDICINE

## 2024-10-17 PROCEDURE — G8420 CALC BMI NORM PARAMETERS: HCPCS | Performed by: INTERNAL MEDICINE

## 2024-10-17 PROCEDURE — 99211 OFF/OP EST MAY X REQ PHY/QHP: CPT

## 2024-10-17 RX ORDER — FOLIC ACID 1 MG/1
1 TABLET ORAL DAILY
Qty: 180 TABLET | Refills: 5 | Status: SHIPPED | OUTPATIENT
Start: 2024-10-17 | End: 2024-11-16

## 2024-10-17 RX ORDER — 0.9 % SODIUM CHLORIDE 0.9 %
250 INTRAVENOUS SOLUTION INTRAVENOUS ONCE
OUTPATIENT
Start: 2024-10-17 | End: 2024-10-17

## 2024-10-17 ASSESSMENT — PATIENT HEALTH QUESTIONNAIRE - PHQ9
2. FEELING DOWN, DEPRESSED OR HOPELESS: NOT AT ALL
SUM OF ALL RESPONSES TO PHQ QUESTIONS 1-9: 0
SUM OF ALL RESPONSES TO PHQ QUESTIONS 1-9: 0
SUM OF ALL RESPONSES TO PHQ9 QUESTIONS 1 & 2: 0
SUM OF ALL RESPONSES TO PHQ QUESTIONS 1-9: 0
SUM OF ALL RESPONSES TO PHQ QUESTIONS 1-9: 0
1. LITTLE INTEREST OR PLEASURE IN DOING THINGS: NOT AT ALL

## 2024-10-17 NOTE — PROGRESS NOTES
MA Rooming Questions  Patient: Cathleen Cartagena  MRN: [id number]    Date: 10/17/2024        1. Do you have any new issues?   no         2. Do you need any refills on medications?    no    3. Have you had any imaging done since your last visit?   no    4. Have you been hospitalized or seen in the emergency room since your last visit here?   no    5. Did the patient have a depression screening completed today? Yes    PHQ-9 Total Score: 0 (10/17/2024  1:54 PM)       PHQ-9 Given to (if applicable):               PHQ-9 Score (if applicable):                     [] Positive     []  Negative              Does question #9 need addressed (if applicable)                     [] Yes    []  No               Ninfa Ramirez CMA    
10.8  CMP GFR 53 alk phos 162  TSH 0.915, T4: 1.58  Vitamin B12 greater than 2000, folate less than 2.0    3/16/2023 ultrasound head neck soft tissue  IMPRESSION:  No discrete focal thyroid or parathyroid nodule seen on this study.  Possible  8 mm parathyroid nodule along the inferior margin of the left thyroid gland  seen on the prior CT is not definitive visualized on this study.  Recommend  correlation with nuclear medicine parathyroid scan with SPECT CT, which is  already ordered for further evaluation.    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    4/25/2023 CBC WBC 27.0 hemoglobin 16.3 hematocrit 57.3 MCV 80.6 platelets 726 ANC 24,000  CMP AST 12 alk phos 178  Ferritin 77  Iron 26 TIBC 235 sat 11  Vitamin B12 1558, folate 2.5      5/11/2023 CBC WBC 23.8 hemoglobin 16.7 hematocrit 59.4 MCV 80.6 platelets 467 ANC [id number]    Flow cytometry  Final Pathologic Diagnosis:   Peripheral blood; Flow Cytometry Analysis:   -  No diagnostic immunophenotypic abnormalities detected.     Peripheral blood smear review:   -  Mild anisopoikilocytosis.  Leukocytosis with   neutrophilia.  Thrombocytosis.     Comments:   No immunophenotypic evidence of a lymphoproliferative   disorder, acute leukemia or increased blasts is identified.   Myeloproliferative neoplasms and myelodysplastic syndromes   may not show antigenic abnormalities on myeloid cells and   cannot be ruled out by flow cytometry. Please correlate the   result with morphological findings, other pertinent   laboratory data and clinical information.     Flow Differential (%) and Population Analysis:   Lymphocytes: 6.1%   T-cells (88% of lymphoid cells) show a CD4:CD8 ratio of 0.6   without overt phenotypic abnormality. NK-cells (9% of   lymphoid cells) are unremarkable. Mature B-cells (1% of   lymphoid cells) are polyclonal (kappa:lambda ratio of

## 2024-10-17 NOTE — PROGRESS NOTES
Order placed for therapeutic phlebotomy: withdraw 500 mL blood x1 per physician instruction. Therapy plan added. Patient will be contacted with appointment time once scheduled at [facility] OP infusion.

## 2024-11-12 ENCOUNTER — HOSPITAL ENCOUNTER (OUTPATIENT)
Dept: INFUSION THERAPY | Age: 74
Setting detail: INFUSION SERIES
Discharge: HOME | End: 2024-11-12
Payer: MEDICARE

## 2024-11-12 VITALS
HEART RATE: 88 BPM | DIASTOLIC BLOOD PRESSURE: 70 MMHG | OXYGEN SATURATION: 96 % | SYSTOLIC BLOOD PRESSURE: 117 MMHG | RESPIRATION RATE: 16 BRPM | TEMPERATURE: 97 F

## 2024-11-12 DIAGNOSIS — D45 POLYCYTHEMIA VERA (HCC): Primary | ICD-10-CM

## 2024-11-12 PROCEDURE — 99195 PHLEBOTOMY: CPT

## 2024-11-12 RX ORDER — 0.9 % SODIUM CHLORIDE 0.9 %
250 INTRAVENOUS SOLUTION INTRAVENOUS ONCE
Status: CANCELLED | OUTPATIENT
Start: 2024-11-12 | End: 2024-11-12

## 2024-11-12 NOTE — PROCEDURES
Pre-phlebotomy:  Pulse: 88 Blood Pressure:  114/66    Post-phlebotomy:  Pulse:  79   Blood Pressure:  111 71    Volume Removed:  550    Complications:  none    Comments:  right ac, by GLADIS Iverson

## 2024-11-12 NOTE — PLAN OF CARE
Ambulatory to unit room 3 for Therapeutic Phlebotomy.Reorientated to unit.Procedure and plan of care explained.Questions answered.Understanding verbalized.

## 2024-11-12 NOTE — DISCHARGE INSTRUCTIONS
Discharge instructions:     *Do NOT skip meals today   *Drink PLENTY of liquids today, especially water   *Rest today   *Continue your medications as prescribed   *Return to your physician as planned    * If you feel a little lightheaded, lie down for a while,  and have some snacks.   * Take your time when standing up from a sitting or  lying position     If you feel a little lightheaded, sit or lie back down     Have a snack or have something to drink    Call your doctor now or seek immediate medical care if:   *You are dizzy or lightheaded or feel like you may faint >1 day   * You have signs of infection, such as:    Increased pain, swelling, warmth, or redness    Red streaks leading from the area    Pus draining from the area    A fever         Thank you for choosing [facility]fusion [facility]. It is our pleasure to serve you    [facility]  Hours: 8:00-5:00  Phone: [phone]

## 2024-12-05 ENCOUNTER — OFFICE VISIT (OUTPATIENT)
Dept: ONCOLOGY | Age: 74
End: 2024-12-05
Payer: MEDICARE

## 2024-12-05 ENCOUNTER — HOSPITAL ENCOUNTER (OUTPATIENT)
Dept: INFUSION THERAPY | Age: 74
Discharge: HOME | End: 2024-12-05
Payer: MEDICARE

## 2024-12-05 VITALS
DIASTOLIC BLOOD PRESSURE: 80 MMHG | SYSTOLIC BLOOD PRESSURE: 124 MMHG | RESPIRATION RATE: 16 BRPM | HEIGHT: 62 IN | WEIGHT: 132.6 LBS | TEMPERATURE: 97.5 F | HEART RATE: 80 BPM | OXYGEN SATURATION: 94 % | BODY MASS INDEX: 24.4 KG/M2

## 2024-12-05 DIAGNOSIS — D45 POLYCYTHEMIA VERA (HCC): Primary | ICD-10-CM

## 2024-12-05 DIAGNOSIS — C44.92 SQUAMOUS CELL CARCINOMA OF SKIN, UNSPECIFIED: ICD-10-CM

## 2024-12-05 PROCEDURE — 1090F PRES/ABSN URINE INCON ASSESS: CPT | Performed by: INTERNAL MEDICINE

## 2024-12-05 PROCEDURE — 1123F ACP DISCUSS/DSCN MKR DOCD: CPT | Performed by: INTERNAL MEDICINE

## 2024-12-05 PROCEDURE — G8482 FLU IMMUNIZE ORDER/ADMIN: HCPCS | Performed by: INTERNAL MEDICINE

## 2024-12-05 PROCEDURE — 1159F MED LIST DOCD IN RCRD: CPT | Performed by: INTERNAL MEDICINE

## 2024-12-05 PROCEDURE — 1036F TOBACCO NON-USER: CPT | Performed by: INTERNAL MEDICINE

## 2024-12-05 PROCEDURE — 1126F AMNT PAIN NOTED NONE PRSNT: CPT | Performed by: INTERNAL MEDICINE

## 2024-12-05 PROCEDURE — G8420 CALC BMI NORM PARAMETERS: HCPCS | Performed by: INTERNAL MEDICINE

## 2024-12-05 PROCEDURE — G8399 PT W/DXA RESULTS DOCUMENT: HCPCS | Performed by: INTERNAL MEDICINE

## 2024-12-05 PROCEDURE — 3017F COLORECTAL CA SCREEN DOC REV: CPT | Performed by: INTERNAL MEDICINE

## 2024-12-05 PROCEDURE — 99211 OFF/OP EST MAY X REQ PHY/QHP: CPT

## 2024-12-05 PROCEDURE — G8427 DOCREV CUR MEDS BY ELIG CLIN: HCPCS | Performed by: INTERNAL MEDICINE

## 2024-12-05 PROCEDURE — 99214 OFFICE O/P EST MOD 30 MIN: CPT | Performed by: INTERNAL MEDICINE

## 2024-12-05 RX ORDER — MECLIZINE HYDROCHLORIDE 25 MG/1
25 TABLET ORAL 3 TIMES DAILY PRN
Qty: 21 TABLET | Refills: 0 | Status: SHIPPED | OUTPATIENT
Start: 2024-12-05 | End: 2024-12-12

## 2024-12-05 ASSESSMENT — PATIENT HEALTH QUESTIONNAIRE - PHQ9
1. LITTLE INTEREST OR PLEASURE IN DOING THINGS: NOT AT ALL
SUM OF ALL RESPONSES TO PHQ9 QUESTIONS 1 & 2: 0
SUM OF ALL RESPONSES TO PHQ QUESTIONS 1-9: 0
2. FEELING DOWN, DEPRESSED OR HOPELESS: NOT AT ALL
SUM OF ALL RESPONSES TO PHQ QUESTIONS 1-9: 0

## 2024-12-05 NOTE — PROGRESS NOTES
MA Rooming Questions  Patient: Cathleen Cartagena  MRN: [id number]    Date: 12/5/2024        1. Do you have any new issues?   no         2. Do you need any refills on medications?    no    3. Have you had any imaging done since your last visit?   no    4. Have you been hospitalized or seen in the emergency room since your last visit here?   no    5. Did the patient have a depression screening completed today? Yes    No data recorded     PHQ-9 Given to (if applicable):               PHQ-9 Score (if applicable):                     [] Positive     []  Negative              Does question #9 need addressed (if applicable)                     [] Yes    []  No               Jasmyn Hook CMA    
432, iron saturations of 6%, B12 of 1352 ferritin of 19    10/10/2024, WBC of 7.3 hemoglobin of 16.6 hematocrit of 54.8 MCV of 92 and platelets of 207 BUN of 10 and creatinine 0.94 LFTs within normal limits iron saturations of 45% ferritin 160    11/27/2024 WBC of 7.5 hemoglobin of 13.9 hematocrit 43 MCV of 101 platelets of 526.  Creatinine 1.05 calcium of 9 iron saturations of 51% B12 of 943 folic acid 10.1  ferritin of 152    PMH:   Polycythemia vera  Hypothyroidism  CAD  Lower back pain  HTN  HLP    PSH:  left rotator cuff repair 2017  Laminectomies x 2  cholecystectomy  hernia repair  removal of renal ca;culi  Total thyroidectomy  CABG  hemorrhoidectomy x 2  Hysterectomy  Lumpectomy left breast, non malignant  Eye lid surgery    Allergies: None    SH: Grand son lives with her. 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     FH: Brother has leukemia ?type. No other blood dyscrasias    Interval History: 12/5/2024:  Arrived alone to the  clinic today.  Reported that she has been having intermittent vertigo spells.  Denied any new vision changes.  Chronic stable headache.  Otherwise denied any chest pain or increased shortness of breath.  No abdominal pain.  Has gained few pounds.  No bleeding or rash.  Reported chronic nasal discharge, no fever.  No cough.    Review of Systems   Per interval history; otherwise 10 point ROS is negative              Vital Signs:  /80 (Site: Right Upper Arm, Position: Sitting, Cuff Size: Medium Adult)   Pulse 80   Temp 97.5 °F (36.4 °C) (Infrared)   Resp 16   Ht 1.575 m (5' 2.01\")   Wt 60.1 kg (132 lb 9.6 oz)   SpO2 94%   BMI 24.25 kg/m²     Physical Exam:    CONSTITUTIONAL: awake, alert, tired appearing  EYES:No palor or icterus   ENT:NCAT  NECK: intact scar mid neck area  HEMATOLOGIC/LYMPHATIC: no cervical, supraclavicular or axillary lymphadenopathy   LUNGS: CTAB  CARDIOVASCULAR:Sternal scar healed well. s1s2

## 2025-02-17 NOTE — PROGRESS NOTES
.Surgery @ [facility] on 2/21/25 at 0730, arrival 0600    NOTHING TO EAT OR DRINK AFTER MIDNIGHT DAY OF SURGERY    1. Enter thru the hospital main entrance on day of surgery, check in at the Information Desk. If you arrive prior to 6:00am, enter thru the ER entrance.    2. Follow the directions as prescribed by the doctor for your procedure and medications.         Morning of surgery take:  Amlodipine, Carvedilol and Levothyroxine with sips of water         Stop vitamins, supplements and NSAIDS:  per Dr. Silverman was told she did not need to stop any medications, including her Xarelto    3. Check with your Doctor regarding stopping blood thinners and follow their instructions.    4. Do not smoke, vape or use chewing tobacco morning of surgery. Do not drink any alcoholic beverages 24 hours prior to surgery.       This includes NA Beer. No street drugs 7 days prior to surgery.    5. If you have dentures, contacts of glasses they will be removed before going to the OR; please bring a case.    6. Please bring picture ID, insurance card, paperwork from the doctor’s office (H & P, Consent, & card for implantable devices).    7. Take a shower with an antibacterial soap the night before surgery and the morning of surgery. Do not put anything on your skin      After your morning shower.    8. You will need a responsible adult to drive you home and check on you after surgery.

## 2025-02-20 ENCOUNTER — ANESTHESIA EVENT (OUTPATIENT)
Dept: OPERATING ROOM | Age: 75
End: 2025-02-20
Payer: MEDICARE

## 2025-02-20 RX ORDER — OXYCODONE HYDROCHLORIDE 5 MG/1
5 TABLET ORAL
Status: CANCELLED | OUTPATIENT
Start: 2025-02-20 | End: 2025-02-21

## 2025-02-20 RX ORDER — LABETALOL HYDROCHLORIDE 5 MG/ML
10 INJECTION, SOLUTION INTRAVENOUS
Status: CANCELLED | OUTPATIENT
Start: 2025-02-20

## 2025-02-20 RX ORDER — ONDANSETRON 2 MG/ML
4 INJECTION INTRAMUSCULAR; INTRAVENOUS
Status: CANCELLED | OUTPATIENT
Start: 2025-02-20 | End: 2025-02-21

## 2025-02-20 RX ORDER — SODIUM CHLORIDE 0.9 % (FLUSH) 0.9 %
5-40 SYRINGE (ML) INJECTION PRN
Status: CANCELLED | OUTPATIENT
Start: 2025-02-20

## 2025-02-20 RX ORDER — SODIUM CHLORIDE 9 MG/ML
INJECTION, SOLUTION INTRAVENOUS PRN
Status: CANCELLED | OUTPATIENT
Start: 2025-02-20

## 2025-02-20 RX ORDER — ACETAMINOPHEN 325 MG/1
650 TABLET ORAL
Status: CANCELLED | OUTPATIENT
Start: 2025-02-20 | End: 2025-02-21

## 2025-02-20 RX ORDER — DIPHENHYDRAMINE HYDROCHLORIDE 50 MG/ML
12.5 INJECTION INTRAMUSCULAR; INTRAVENOUS
Status: CANCELLED | OUTPATIENT
Start: 2025-02-20 | End: 2025-02-21

## 2025-02-20 RX ORDER — SODIUM CHLORIDE 0.9 % (FLUSH) 0.9 %
5-40 SYRINGE (ML) INJECTION EVERY 12 HOURS SCHEDULED
Status: CANCELLED | OUTPATIENT
Start: 2025-02-20

## 2025-02-20 RX ORDER — LORAZEPAM 2 MG/ML
0.5 INJECTION INTRAMUSCULAR
Status: CANCELLED | OUTPATIENT
Start: 2025-02-20 | End: 2025-02-21

## 2025-02-20 RX ORDER — NALOXONE HYDROCHLORIDE 0.4 MG/ML
INJECTION, SOLUTION INTRAMUSCULAR; INTRAVENOUS; SUBCUTANEOUS PRN
Status: CANCELLED | OUTPATIENT
Start: 2025-02-20

## 2025-02-20 RX ORDER — METOCLOPRAMIDE HYDROCHLORIDE 5 MG/ML
10 INJECTION INTRAMUSCULAR; INTRAVENOUS
Status: CANCELLED | OUTPATIENT
Start: 2025-02-20 | End: 2025-02-21

## 2025-02-20 RX ORDER — SODIUM CHLORIDE, SODIUM LACTATE, POTASSIUM CHLORIDE, CALCIUM CHLORIDE 600; 310; 30; 20 MG/100ML; MG/100ML; MG/100ML; MG/100ML
INJECTION, SOLUTION INTRAVENOUS CONTINUOUS
Status: CANCELLED | OUTPATIENT
Start: 2025-02-20

## 2025-02-20 NOTE — PROGRESS NOTES
2/20/25 - .Notified patient surgery @ [facility] on  2/21/25 @ 0900, arrival 0700. NPO status and morning medications reviewed. Understanding verbalized.

## 2025-02-21 ENCOUNTER — HOSPITAL ENCOUNTER (OUTPATIENT)
Age: 75
Setting detail: OUTPATIENT SURGERY
Discharge: HOME | End: 2025-02-21
Attending: PODIATRIST | Admitting: PODIATRIST
Payer: MEDICARE

## 2025-02-21 ENCOUNTER — APPOINTMENT (OUTPATIENT)
Dept: GENERAL RADIOLOGY | Age: 75
End: 2025-02-21
Attending: PODIATRIST
Payer: MEDICARE

## 2025-02-21 ENCOUNTER — ANESTHESIA (OUTPATIENT)
Dept: OPERATING ROOM | Age: 75
End: 2025-02-21
Payer: MEDICARE

## 2025-02-21 VITALS
DIASTOLIC BLOOD PRESSURE: 86 MMHG | SYSTOLIC BLOOD PRESSURE: 123 MMHG | RESPIRATION RATE: 18 BRPM | WEIGHT: 125 LBS | TEMPERATURE: 97.8 F | OXYGEN SATURATION: 95 % | HEIGHT: 62 IN | BODY MASS INDEX: 23 KG/M2 | HEART RATE: 69 BPM

## 2025-02-21 DIAGNOSIS — M79.671 PAIN IN RIGHT FOOT: Primary | ICD-10-CM

## 2025-02-21 PROCEDURE — 2709999900 HC NON-CHARGEABLE SUPPLY: Performed by: PODIATRIST

## 2025-02-21 PROCEDURE — 7100000011 HC PHASE II RECOVERY - ADDTL 15 MIN: Performed by: PODIATRIST

## 2025-02-21 PROCEDURE — 7100000010 HC PHASE II RECOVERY - FIRST 15 MIN: Performed by: PODIATRIST

## 2025-02-21 PROCEDURE — 3600000003 HC SURGERY LEVEL 3 BASE: Performed by: PODIATRIST

## 2025-02-21 PROCEDURE — 2580000003 HC RX 258: Performed by: PODIATRIST

## 2025-02-21 PROCEDURE — 6360000002 HC RX W HCPCS: Performed by: PODIATRIST

## 2025-02-21 PROCEDURE — 3600000013 HC SURGERY LEVEL 3 ADDTL 15MIN: Performed by: PODIATRIST

## 2025-02-21 RX ORDER — SODIUM CHLORIDE 0.9 % (FLUSH) 0.9 %
5-40 SYRINGE (ML) INJECTION PRN
Status: DISCONTINUED | OUTPATIENT
Start: 2025-02-21 | End: 2025-02-21 | Stop reason: HOSPADM

## 2025-02-21 RX ORDER — SODIUM CHLORIDE 0.9 % (FLUSH) 0.9 %
5-40 SYRINGE (ML) INJECTION EVERY 12 HOURS SCHEDULED
Status: DISCONTINUED | OUTPATIENT
Start: 2025-02-21 | End: 2025-02-21 | Stop reason: HOSPADM

## 2025-02-21 RX ORDER — LIDOCAINE HYDROCHLORIDE 10 MG/ML
INJECTION, SOLUTION INFILTRATION; PERINEURAL
Status: COMPLETED | OUTPATIENT
Start: 2025-02-21 | End: 2025-02-21

## 2025-02-21 RX ORDER — SODIUM CHLORIDE, SODIUM LACTATE, POTASSIUM CHLORIDE, CALCIUM CHLORIDE 600; 310; 30; 20 MG/100ML; MG/100ML; MG/100ML; MG/100ML
INJECTION, SOLUTION INTRAVENOUS CONTINUOUS
Status: DISCONTINUED | OUTPATIENT
Start: 2025-02-21 | End: 2025-02-21 | Stop reason: HOSPADM

## 2025-02-21 RX ORDER — SODIUM CHLORIDE 9 MG/ML
INJECTION, SOLUTION INTRAVENOUS PRN
Status: DISCONTINUED | OUTPATIENT
Start: 2025-02-21 | End: 2025-02-21 | Stop reason: HOSPADM

## 2025-02-21 RX ADMIN — SODIUM CHLORIDE, SODIUM LACTATE, POTASSIUM CHLORIDE, AND CALCIUM CHLORIDE: .6; .31; .03; .02 INJECTION, SOLUTION INTRAVENOUS at 07:55

## 2025-02-21 ASSESSMENT — PAIN SCALES - GENERAL: PAINLEVEL_OUTOF10: 0

## 2025-02-21 ASSESSMENT — PAIN - FUNCTIONAL ASSESSMENT
PAIN_FUNCTIONAL_ASSESSMENT: 0-10
PAIN_FUNCTIONAL_ASSESSMENT: 0-10

## 2025-02-21 NOTE — BRIEF OP NOTE
Brief Postoperative Note      Patient: Cathleen Cartagena  YOB: 1950  MRN: [id number]    Date of Procedure: 2/21/2025    Pre-Op Diagnosis Codes:      * Pain due to internal orthopedic prosthetic devices, implants and grafts, initial encounter [T84.84XA]     * Pain in right foot [M79.671]    Post-Op Diagnosis: Same       Procedure(s):  FOOT DEEP HARDWARE REMOVAL    Surgeon(s):  Shiva Silverman DPM    Assistant:  First Assistant: Esther Lockhart    Anesthesia: Local    Estimated Blood Loss (mL): 1 ml    Complications: None    Specimens:   * No specimens in log *    Implants:  * No implants in log *      Drains: * No LDAs found *    Findings:  Infection Present At Time Of Surgery (PATOS) (choose all levels that have infection present):  No infection present  Other Findings: as expected    Electronically signed by Shiva Silverman DPM on 2/21/2025 at 10:00 AM

## 2025-02-21 NOTE — H&P
..Update History & Physical    The patient's History and Physical of February 12, 2025 was reviewed with the patient and I examined the patient. There was no change. The surgical site was confirmed by the patient and me.       Plan: The risks, benefits, expected outcome, and alternative to the recommended procedure have been discussed with the patient. Patient understands and wants to proceed with the procedure.     Electronically signed by Shiva Silverman DPM on 2/21/2025 at 8:49 AM

## 2025-02-21 NOTE — PROGRESS NOTES
1005: Patient returns to [facility] following procedure, bedside report received from Emmanuelle RN, VSS, family at bedside, call light in reach, beverage of choice provided.   1032: VSS, Discharge instructions reviewed with patient and son Angel, both verbalized understanding. Crutches and post- op shoe provided  1040: IV removed  1045: Patient dressing, call light in reach  1050: Patient taken via wheelchair to DC to car w family.

## 2025-02-21 NOTE — OP NOTE
[facility] [address] [place] [address]                            OPERATIVE REPORT      PATIENT NAME: JOCELYN GIL                : 1950  MED REC NO: [id number]                      ROOM: OR  ACCOUNT NO: [id number]                       ADMIT DATE: 2025  PROVIDER: Shiva Silverman DPM      DATE OF PROCEDURE:  2025    SURGEON:  Shiva Silverman DPM    PREOPERATIVE DIAGNOSIS:  Painful retained hardware to the right foot.    POSTOPERATIVE DIAGNOSIS:  Painful retained hardware to the right foot.    PROCEDURE:  Removal of hardware, deep, right foot.    ANESTHESIA:  Local.    HEMOSTASIS:  Pneumatic ankle tourniquet set at 250 mmHg.    ESTIMATED BLOOD LOSS:  Less than 1 mL.    MATERIALS REMOVED:  One 0.045 smooth K-wire.    COMPLICATION:  None.    CONDITION:  Stable.    INDICATIONS FOR THE PROCEDURE:  The patient is a very pleasant 74-year-old female.  Several years back, she had a bunionectomy that they used the older technique with a K-wire for fixation of the tailor's bunionectomy on the right foot.  The patient had done well with it for several years, but then she started getting some tenting of the skin and pain associated to the lateral part of her foot.  Upon evaluation, it was determined that she had prominent hardware that was from the K-wire which was bothering her.  The patient had tried some different shoes, but it still was problematic and symptomatic.  Went over all risks, benefits, and complications of doing the procedure in detail.  The patient at this point understands that there are no guarantees and wishes to proceed.  Consent was signed and submitted to chart.  The patient understands that it will be about a 2 to 3-week recovery before she can go back into her regular shoe.    DESCRIPTION OF PROCEDURE:  Under mild sedation, the patient was brought to the OR and placed on the operating room table in the

## 2025-02-21 NOTE — ANESTHESIA PRE PROCEDURE
Department of Anesthesiology  Preprocedure Note       Name:  Cathleen Cartagena   Age:  74 y.o.  :  1950                                          MRN:  [id number]         Date:  2025      Surgeon: Surgeon(s):  Shiva Silverman DPM    Procedure: Procedure(s):  FOOT DEEP HARDWARE REMOVAL    Medications prior to admission:   Prior to Admission medications    Medication Sig Start Date End Date Taking? Authorizing Provider   folic acid (FOLVITE) 1 MG tablet Take 1 tablet by mouth daily 10/17/24 2/21/25 Yes Jolanta Bello MD   hydroxyurea (HYDREA) 500 MG chemo capsule Take 2 capsules by mouth daily 4/3/24 9/25/25 Yes Jolanta Bello MD   levothyroxine (SYNTHROID) 75 MCG tablet Take 1 tablet by mouth Daily   Yes Provider, MD Adrienne   Melatonin 10 MG TABS Take by mouth nightly as needed   Yes Provider, Historical, MD   DULoxetine (CYMBALTA) 30 MG extended release capsule  23  Yes Provider, Historical, MD   acetaminophen (TYLENOL) 325 MG tablet Take 2 tablets by mouth every 6 hours as needed for Pain 23  Yes Elder Mendez DO   losartan (COZAAR) 100 MG tablet Take 1 tablet by mouth daily 23  Yes Bruce Gomez MD   QUEtiapine (SEROQUEL) 25 MG tablet Take 1 tablet by mouth daily 22  Yes Provider, Historical, MD   amLODIPine (NORVASC) 5 MG tablet Take 1 tablet by mouth daily   Yes Provider, Historical, MD   XARELTO 20 MG TABS tablet Take 1 tablet by mouth daily 21  Yes Provider, Historical, MD   atorvastatin (LIPITOR) 40 MG tablet Take 1 tablet by mouth daily   Yes Provider, Historical, MD   Esomeprazole Magnesium (NEXIUM PO) Take 1 tablet by mouth Daily with lunch   Yes Provider, Historical, MD   carvedilol (COREG) 6.25 MG tablet Take 1 tablet by mouth 2 times daily  Patient taking differently: Take 1 tablet by mouth daily 9/27/15  Yes Alessandor Ruth MD       Current medications:    Current Facility-Administered Medications   Medication Dose Route Frequency Provider  AM    ALT 6 03/25/2024 08:29 AM       POC Tests: No results for input(s): \"POCGLU\", \"POCNA\", \"POCK\", \"POCCL\", \"POCBUN\", \"POCHEMO\", \"POCHCT\" in the last 72 hours.    Coags:   Lab Results   Component Value Date/Time    PROTIME 20.9 01/04/2024 05:15 AM    INR 1.8 01/04/2024 05:15 AM    APTT 31.3 09/17/2020 09:46 AM       HCG (If Applicable): No results found for: \"PREGTESTUR\", \"PREGSERUM\", \"HCG\", \"HCGQUANT\"     ABGs:   Lab Results   Component Value Date/Time    PO2ART 218 09/23/2015 10:31 AM    BFW8ZDP 37.3 09/23/2015 10:31 AM    XLR1SHD 22 09/23/2015 10:31 AM        Type & Screen (If Applicable):  No results found for: \"ABORH\", \"LABANTI\"    Drug/Infectious Status (If Applicable):  No results found for: \"HIV\", \"HEPCAB\"    COVID-19 Screening (If Applicable):   Lab Results   Component Value Date/Time    COVID19 NOT DETECTED 01/03/2024 11:20 AM           Anesthesia Evaluation  Patient summary reviewed  Airway: Mallampati: II  TM distance: >3 FB   Neck ROM: full  Mouth opening: > = 3 FB   Dental:          Pulmonary:normal exam                               Cardiovascular:    (+) hypertension:, CAD:, CABG/stent:, hyperlipidemia                  Neuro/Psych:   (+) TIA            GI/Hepatic/Renal:   (+) GERD:          Endo/Other:    (+) hypothyroidism, blood dyscrasia: anticoagulation therapy:..                 Abdominal: normal exam            Vascular:          Other Findings:       Anesthesia Plan      general     ASA 3       Induction: intravenous.      Anesthetic plan and risks discussed with patient.    Use of blood products discussed with patient whom consented to blood products.    Plan discussed with CRNA.    Attending anesthesiologist reviewed and agrees with Preprocedure content            Adam Sánchez MD   2/21/2025

## 2025-02-21 NOTE — H&P
..The patient was counseled at length about the risks of lynn Covid-19 during their perioperative period and any recovery window from their procedure.  The patient was made aware that lynn Covid-19  may worsen their prognosis for recovering from their procedure  and lend to a higher morbidity and/or mortality risk.  All material risks, benefits, and reasonable alternatives including postponing the procedure were discussed. The patient does wish to proceed with the procedure at this time.

## 2025-03-13 ENCOUNTER — CLINICAL DOCUMENTATION (OUTPATIENT)
Dept: ONCOLOGY | Age: 75
End: 2025-03-13

## 2025-03-13 DIAGNOSIS — C44.92 SQUAMOUS CELL CARCINOMA OF SKIN, UNSPECIFIED: ICD-10-CM

## 2025-03-13 DIAGNOSIS — D45 POLYCYTHEMIA VERA (HCC): Primary | ICD-10-CM

## 2025-03-13 LAB
ALBUMIN: 3.9 G/DL (ref 3.8–4.8)
ALP BLD-CCNC: 95 IU/L (ref 44–121)
ALT SERPL-CCNC: 6 IU/L (ref 0–32)
AST SERPL-CCNC: 9 IU/L (ref 0–40)
BASOPHILS ABSOLUTE: 0 X10E3/UL (ref 0–0.2)
BASOPHILS RELATIVE PERCENT: 0 %
BILIRUB SERPL-MCNC: 0.5 MG/DL (ref 0–1.2)
BUN / CREAT RATIO: 10 (ref 12–28)
BUN BLDV-MCNC: 9 MG/DL (ref 8–27)
CALCIUM SERPL-MCNC: 8.7 MG/DL (ref 8.7–10.3)
CHLORIDE BLD-SCNC: 102 MMOL/L (ref 96–106)
CO2: 26 MMOL/L (ref 20–29)
CREAT SERPL-MCNC: 0.91 MG/DL (ref 0.57–1)
EOSINOPHILS ABSOLUTE: 0 X10E3/UL (ref 0–0.4)
EOSINOPHILS RELATIVE PERCENT: 1 %
ESTIMATED GLOMERULAR FILTRATION RATE CREATININE EQUATION: 66 ML/MIN/1.73
FERRITIN: 243 NG/ML (ref 15–150)
GLOBULIN: 2 G/DL (ref 1.5–4.5)
GLUCOSE BLD-MCNC: 94 MG/DL (ref 70–99)
HCT VFR BLD CALC: 28 % (ref 34–46.6)
HEMOGLOBIN: 9.3 G/DL (ref 11.1–15.9)
IMMATURE GRANS (ABS): 0 X10E3/UL (ref 0–0.1)
IMMATURE GRANULOCYTES %: 1 %
IRON % SATURATION: 74 % (ref 15–55)
IRON: 152 UG/DL (ref 27–139)
LACTATE DEHYDROGENASE: 177 IU/L (ref 119–226)
LYMPHOCYTES ABSOLUTE: 1.2 X10E3/UL (ref 0.7–3.1)
LYMPHOCYTES RELATIVE PERCENT: 42 %
MCH RBC QN AUTO: 41.2 PG (ref 26.6–33)
MCHC RBC AUTO-ENTMCNC: 33.2 G/DL (ref 31.5–35.7)
MCV RBC AUTO: 124 FL (ref 79–97)
MONOCYTES ABSOLUTE: 0.1 X10E3/UL (ref 0.1–0.9)
MONOCYTES RELATIVE PERCENT: 2 %
MORPHOLOGY: ABNORMAL
NEUTROPHILS ABSOLUTE: 1.6 X10E3/UL (ref 1.4–7)
NEUTROPHILS RELATIVE PERCENT: 54 %
PDW BLD-RTO: 19.8 % (ref 11.7–15.4)
PLATELET # BLD: 262 X10E3/UL (ref 150–450)
POTASSIUM SERPL-SCNC: 4 MMOL/L (ref 3.5–5.2)
RBC # BLD: 2.26 X10E6/UL (ref 3.77–5.28)
SODIUM BLD-SCNC: 140 MMOL/L (ref 134–144)
TOTAL IRON BINDING CAPACITY: 205 UG/DL (ref 250–450)
TOTAL PROTEIN: 5.9 G/DL (ref 6–8.5)
UNSATURATED IRON BINDING CAPACITY: 53 UG/DL (ref 118–369)
WBC # BLD: 2.9 X10E3/UL (ref 3.4–10.8)

## 2025-03-13 NOTE — PROGRESS NOTES
Physician requesting to repeat CBC, Ferritin, RC, Hapto, and LDH as well as flow cytometry. Lab orders placed. Physician also recommending that patient hold hydrea and would prefer to see patient sooner than 03/21/2025. Called patient @ [phone] to notify. Voices understanding and agreeable to plan. Patient to complete labs in [place]. Patient rescheduled for OV on 03/17/2025 at 1430. No further needs addressed at this time.

## 2025-03-14 ENCOUNTER — PREP FOR PROCEDURE (OUTPATIENT)
Dept: ONCOLOGY | Age: 75
End: 2025-03-14

## 2025-03-14 LAB
BACTERIA: ABNORMAL
BASOPHILS ABSOLUTE: 0 X10E3/UL (ref 0–0.2)
BASOPHILS RELATIVE PERCENT: 1 %
BILIRUBIN, URINE: NEGATIVE
BLOOD, URINE: NEGATIVE
BUN / CREAT RATIO: 9 (ref 12–28)
BUN BLDV-MCNC: 10 MG/DL (ref 8–27)
CALCIUM SERPL-MCNC: 8.7 MG/DL (ref 8.7–10.3)
CASTS: ABNORMAL /LPF
CHLORIDE BLD-SCNC: 101 MMOL/L (ref 96–106)
CLARITY, UA: CLEAR
CO2: 25 MMOL/L (ref 20–29)
COLOR, UA: YELLOW
CREAT SERPL-MCNC: 1.13 MG/DL (ref 0.57–1)
EOSINOPHILS ABSOLUTE: 0 X10E3/UL (ref 0–0.4)
EOSINOPHILS RELATIVE PERCENT: 1 %
EPITHELIAL CELLS, UA: ABNORMAL /HPF (ref 0–10)
ESTIMATED GLOMERULAR FILTRATION RATE CREATININE EQUATION: 51 ML/MIN/1.73
GLUCOSE BLD-MCNC: 93 MG/DL (ref 70–99)
GLUCOSE URINE: NEGATIVE
HCT VFR BLD CALC: 29.9 % (ref 34–46.6)
HEMOGLOBIN: 10 G/DL (ref 11.1–15.9)
IMMATURE GRANS (ABS): 0 X10E3/UL (ref 0–0.1)
IMMATURE GRANULOCYTES %: 1 %
KETONES, URINE: ABNORMAL
LEUKOCYTE ESTERASE, URINE: ABNORMAL
LEUKOCYTES, UA: ABNORMAL /HPF (ref 0–5)
LYMPHOCYTES ABSOLUTE: 1.4 X10E3/UL (ref 0.7–3.1)
LYMPHOCYTES RELATIVE PERCENT: 34 %
MCH RBC QN AUTO: 42.2 PG (ref 26.6–33)
MCHC RBC AUTO-ENTMCNC: 33.4 G/DL (ref 31.5–35.7)
MCV RBC AUTO: 126 FL (ref 79–97)
MICROSCOPIC EXAMINATION: ABNORMAL
MONOCYTES ABSOLUTE: 0.1 X10E3/UL (ref 0.1–0.9)
MONOCYTES RELATIVE PERCENT: 3 %
MORPHOLOGY: ABNORMAL
NEUTROPHILS ABSOLUTE: 2.6 X10E3/UL (ref 1.4–7)
NEUTROPHILS RELATIVE PERCENT: 60 %
NITRITE, URINE: NEGATIVE
PDW BLD-RTO: 19.6 % (ref 11.7–15.4)
PH, URINE: 6 (ref 5–7.5)
PLATELET # BLD: 370 X10E3/UL (ref 150–450)
POTASSIUM SERPL-SCNC: 4 MMOL/L (ref 3.5–5.2)
PROTEIN UA: ABNORMAL
RBC # BLD: 2.37 X10E6/UL (ref 3.77–5.28)
RBC UA: ABNORMAL /HPF (ref 0–2)
SODIUM BLD-SCNC: 140 MMOL/L (ref 134–144)
SPECIFIC GRAVITY UA: 1.02 (ref 1–1.03)
UROBILINOGEN, URINE: 1 MG/DL (ref 0.2–1)
WBC # BLD: 4.3 X10E3/UL (ref 3.4–10.8)

## 2025-03-17 ENCOUNTER — TELEPHONE (OUTPATIENT)
Dept: ONCOLOGY | Age: 75
End: 2025-03-17

## 2025-03-17 ENCOUNTER — OFFICE VISIT (OUTPATIENT)
Dept: ONCOLOGY | Age: 75
End: 2025-03-17
Payer: MEDICARE

## 2025-03-17 ENCOUNTER — HOSPITAL ENCOUNTER (OUTPATIENT)
Dept: INFUSION THERAPY | Age: 75
Discharge: HOME | End: 2025-03-17
Payer: MEDICARE

## 2025-03-17 VITALS
SYSTOLIC BLOOD PRESSURE: 120 MMHG | HEIGHT: 62 IN | OXYGEN SATURATION: 95 % | WEIGHT: 135.4 LBS | DIASTOLIC BLOOD PRESSURE: 61 MMHG | HEART RATE: 75 BPM | BODY MASS INDEX: 24.92 KG/M2 | TEMPERATURE: 97.7 F | RESPIRATION RATE: 16 BRPM

## 2025-03-17 DIAGNOSIS — E53.8 B12 DEFICIENCY: ICD-10-CM

## 2025-03-17 DIAGNOSIS — D45 POLYCYTHEMIA VERA: Primary | ICD-10-CM

## 2025-03-17 DIAGNOSIS — D50.0 IRON DEFICIENCY ANEMIA SECONDARY TO BLOOD LOSS (CHRONIC): ICD-10-CM

## 2025-03-17 PROCEDURE — 99212 OFFICE O/P EST SF 10 MIN: CPT

## 2025-03-17 PROCEDURE — G8427 DOCREV CUR MEDS BY ELIG CLIN: HCPCS | Performed by: INTERNAL MEDICINE

## 2025-03-17 PROCEDURE — 99213 OFFICE O/P EST LOW 20 MIN: CPT | Performed by: INTERNAL MEDICINE

## 2025-03-17 PROCEDURE — 1126F AMNT PAIN NOTED NONE PRSNT: CPT | Performed by: INTERNAL MEDICINE

## 2025-03-17 PROCEDURE — 1090F PRES/ABSN URINE INCON ASSESS: CPT | Performed by: INTERNAL MEDICINE

## 2025-03-17 PROCEDURE — 1036F TOBACCO NON-USER: CPT | Performed by: INTERNAL MEDICINE

## 2025-03-17 PROCEDURE — 1123F ACP DISCUSS/DSCN MKR DOCD: CPT | Performed by: INTERNAL MEDICINE

## 2025-03-17 PROCEDURE — G8399 PT W/DXA RESULTS DOCUMENT: HCPCS | Performed by: INTERNAL MEDICINE

## 2025-03-17 PROCEDURE — 1159F MED LIST DOCD IN RCRD: CPT | Performed by: INTERNAL MEDICINE

## 2025-03-17 PROCEDURE — G8420 CALC BMI NORM PARAMETERS: HCPCS | Performed by: INTERNAL MEDICINE

## 2025-03-17 PROCEDURE — 3017F COLORECTAL CA SCREEN DOC REV: CPT | Performed by: INTERNAL MEDICINE

## 2025-03-17 NOTE — PROGRESS NOTES
MA Rooming Questions  Patient: Cathleen Cartagena  MRN: [id number]    Date: 3/17/2025        1. Do you have any new issues?   Yes- states she had skin cancer removed in January 15, 2025 by  and is not healing.          2. Do you need any refills on medications?    no    3. Have you had any imaging done since your last visit?   no    4. Have you been hospitalized or seen in the emergency room since your last visit here?   yes - foot surgery    5. Did the patient have a depression screening completed today? No    No data recorded     PHQ-9 Given to (if applicable):               PHQ-9 Score (if applicable):                     [] Positive     []  Negative              Does question #9 need addressed (if applicable)                     [] Yes    []  No               Jasmyn Hook CMA      
10 point ROS is negative              Vital Signs:  /61 (BP Site: Right Upper Arm, Patient Position: Sitting, BP Cuff Size: Medium Adult)   Pulse 75   Temp 97.7 °F (36.5 °C) (Infrared)   Resp 16   Ht 1.575 m (5' 2.01\")   Wt 61.4 kg (135 lb 6.4 oz)   SpO2 95%   BMI 24.76 kg/m²     Physical Exam:    CONSTITUTIONAL: awake, alert, tired appearing  EYES:No palor or icterus   ENT:NCAT  NECK: intact scar mid neck area  HEMATOLOGIC/LYMPHATIC: no cervical, supraclavicular or axillary lymphadenopathy   LUNGS: CTAB  CARDIOVASCULAR:Sternal scar healed well. s1s2 rrr SM  ABDOMEN: soft ntnd bs pos, no hsm  NEUROLOGIC: GI   SKIN: AKs several, Scalp scar well-healed. Hematoma left Upper ext  EXTREMITIES: No LE edema bilaterally      Labs:    Hematology:  Lab Results   Component Value Date    WBC 4.3 03/13/2025    RBC 2.37 (LL) 03/13/2025    HGB 10.0 (L) 03/13/2025    HCT 29.9 (L) 03/13/2025     (H) 03/13/2025    MCH 42.2 (H) 03/13/2025    MCHC 33.4 03/13/2025    RDW 19.6 (H) 03/13/2025     03/13/2025    MPV 9.5 03/25/2024    BANDSPCT 3 (L) 10/11/2019    BASOPCT 1 03/13/2025    LYMPHOPCT 34 03/13/2025    MONOPCT 3 03/13/2025    BANDABS 0.38 10/11/2019    EOSABS 0.0 03/13/2025    BASOSABS 0.0 03/13/2025    LYMPHSABS 1.4 03/13/2025    MONOSABS 0.1 03/13/2025    DIFFTYPE AUTOMATED DIFFERENTIAL 03/25/2024    ANISOCYTOSIS 1+ 12/27/2019    POLYCHROM 1+ 12/27/2019    WBCMORP FEW  ATYPICAL LYMPH(S) NOTED   12/23/2019    PLTM RARE LARGE PLATELETS SEEN ON SMEAR 10/11/2019     No results found for: \"ESR\"    Chemistry:  Lab Results   Component Value Date     03/13/2025    K 4.0 03/13/2025     03/13/2025    CO2 25 03/13/2025    BUN 10 03/13/2025    CREATININE 1.13 (H) 03/13/2025    GLUCOSE 93 03/13/2025    CALCIUM 8.7 03/13/2025    BILITOT 0.5 03/12/2025    ALKPHOS 95 03/12/2025    AST 9 03/12/2025    ALT 6 03/12/2025    LABGLOM 54 02/06/2025    GFRAA >60 09/21/2022    AGRATIO 1.6 04/17/2023    GLOB 2.0

## 2025-03-31 ENCOUNTER — HOSPITAL ENCOUNTER (OUTPATIENT)
Dept: INFUSION THERAPY | Age: 75
End: 2025-03-31
Payer: MEDICARE

## 2025-03-31 ENCOUNTER — OFFICE VISIT (OUTPATIENT)
Dept: ONCOLOGY | Age: 75
End: 2025-03-31
Payer: MEDICARE

## 2025-03-31 ENCOUNTER — HOSPITAL ENCOUNTER (OUTPATIENT)
Dept: INFUSION THERAPY | Age: 75
Discharge: HOME | End: 2025-03-31
Payer: MEDICARE

## 2025-03-31 VITALS
DIASTOLIC BLOOD PRESSURE: 82 MMHG | BODY MASS INDEX: 24.22 KG/M2 | OXYGEN SATURATION: 97 % | WEIGHT: 131.6 LBS | HEART RATE: 82 BPM | RESPIRATION RATE: 16 BRPM | HEIGHT: 62 IN | SYSTOLIC BLOOD PRESSURE: 127 MMHG | TEMPERATURE: 97.4 F

## 2025-03-31 DIAGNOSIS — D45 POLYCYTHEMIA VERA: Primary | ICD-10-CM

## 2025-03-31 DIAGNOSIS — D50.0 IRON DEFICIENCY ANEMIA SECONDARY TO BLOOD LOSS (CHRONIC): ICD-10-CM

## 2025-03-31 DIAGNOSIS — D45 POLYCYTHEMIA VERA: ICD-10-CM

## 2025-03-31 DIAGNOSIS — C44.92 SQUAMOUS CELL CARCINOMA OF SKIN, UNSPECIFIED: ICD-10-CM

## 2025-03-31 DIAGNOSIS — E53.8 B12 DEFICIENCY: ICD-10-CM

## 2025-03-31 LAB
ALBUMIN SERPL-MCNC: 4 G/DL (ref 3.4–5)
ALBUMIN/GLOB SERPL: 1.6 {RATIO} (ref 1.1–2.2)
ALP SERPL-CCNC: 100 U/L (ref 40–129)
ALT SERPL-CCNC: 6 U/L (ref 10–40)
ANION GAP SERPL CALCULATED.3IONS-SCNC: 9 MMOL/L (ref 9–17)
AST SERPL-CCNC: 15 U/L (ref 15–37)
BASOPHILS # BLD: 0.12 K/UL
BASOPHILS NFR BLD: 1 % (ref 0–1)
BILIRUB SERPL-MCNC: 0.4 MG/DL (ref 0–1)
BUN SERPL-MCNC: 9 MG/DL (ref 7–20)
CALCIUM SERPL-MCNC: 8.7 MG/DL (ref 8.3–10.6)
CHLORIDE SERPL-SCNC: 103 MMOL/L (ref 99–110)
CO2 SERPL-SCNC: 26 MMOL/L (ref 21–32)
CREAT SERPL-MCNC: 1 MG/DL (ref 0.6–1.2)
EOSINOPHIL # BLD: 0.09 K/UL
EOSINOPHILS RELATIVE PERCENT: 1 % (ref 0–3)
ERYTHROCYTE [DISTWIDTH] IN BLOOD BY AUTOMATED COUNT: 16.7 % (ref 11.7–14.9)
FERRITIN SERPL-MCNC: 170 NG/ML (ref 15–150)
GFR, ESTIMATED: 52 ML/MIN/1.73M2
GLUCOSE SERPL-MCNC: 74 MG/DL (ref 74–99)
HAPTOGLOB SERPL-MCNC: 78 MG/DL (ref 30–200)
HCT VFR BLD AUTO: 38 % (ref 37–47)
HGB BLD-MCNC: 12.1 G/DL (ref 12.5–16)
LDH SERPL-CCNC: 283 U/L (ref 100–190)
LYMPHOCYTES NFR BLD: 1.89 K/UL
LYMPHOCYTES RELATIVE PERCENT: 14 % (ref 24–44)
MCH RBC QN AUTO: 38.3 PG (ref 27–31)
MCHC RBC AUTO-ENTMCNC: 31.8 G/DL (ref 32–36)
MCV RBC AUTO: 120.3 FL (ref 78–100)
MONOCYTES NFR BLD: 0.31 K/UL
MONOCYTES NFR BLD: 2 % (ref 0–4)
NEUTROPHILS NFR BLD: 82 % (ref 36–66)
NEUTS SEG NFR BLD: 10.83 K/UL
PLATELET # BLD AUTO: 552 K/UL (ref 140–440)
PMV BLD AUTO: 9.5 FL (ref 7.5–11.1)
POTASSIUM SERPL-SCNC: 4.3 MMOL/L (ref 3.5–5.1)
PROT SERPL-MCNC: 6.4 G/DL (ref 6.4–8.2)
RBC # BLD AUTO: 3.16 M/UL (ref 4.2–5.4)
RETICS # AUTO: 0.15 M/UL
RETICS/RBC NFR AUTO: 4.5 % (ref 0.2–2)
SODIUM SERPL-SCNC: 139 MMOL/L (ref 136–145)
WBC OTHER # BLD: 13.2 K/UL (ref 4–10.5)

## 2025-03-31 PROCEDURE — 82728 ASSAY OF FERRITIN: CPT

## 2025-03-31 PROCEDURE — 85025 COMPLETE CBC W/AUTO DIFF WBC: CPT

## 2025-03-31 PROCEDURE — G8427 DOCREV CUR MEDS BY ELIG CLIN: HCPCS | Performed by: INTERNAL MEDICINE

## 2025-03-31 PROCEDURE — 99212 OFFICE O/P EST SF 10 MIN: CPT

## 2025-03-31 PROCEDURE — 1036F TOBACCO NON-USER: CPT | Performed by: INTERNAL MEDICINE

## 2025-03-31 PROCEDURE — G8399 PT W/DXA RESULTS DOCUMENT: HCPCS | Performed by: INTERNAL MEDICINE

## 2025-03-31 PROCEDURE — 1123F ACP DISCUSS/DSCN MKR DOCD: CPT | Performed by: INTERNAL MEDICINE

## 2025-03-31 PROCEDURE — 80053 COMPREHEN METABOLIC PANEL: CPT

## 2025-03-31 PROCEDURE — 99214 OFFICE O/P EST MOD 30 MIN: CPT | Performed by: INTERNAL MEDICINE

## 2025-03-31 PROCEDURE — G8420 CALC BMI NORM PARAMETERS: HCPCS | Performed by: INTERNAL MEDICINE

## 2025-03-31 PROCEDURE — 83615 LACTATE (LD) (LDH) ENZYME: CPT

## 2025-03-31 PROCEDURE — 1126F AMNT PAIN NOTED NONE PRSNT: CPT | Performed by: INTERNAL MEDICINE

## 2025-03-31 PROCEDURE — 88185 FLOWCYTOMETRY/TC ADD-ON: CPT

## 2025-03-31 PROCEDURE — 3017F COLORECTAL CA SCREEN DOC REV: CPT | Performed by: INTERNAL MEDICINE

## 2025-03-31 PROCEDURE — 85045 AUTOMATED RETICULOCYTE COUNT: CPT

## 2025-03-31 PROCEDURE — 1159F MED LIST DOCD IN RCRD: CPT | Performed by: INTERNAL MEDICINE

## 2025-03-31 PROCEDURE — 36415 COLL VENOUS BLD VENIPUNCTURE: CPT

## 2025-03-31 PROCEDURE — 88184 FLOWCYTOMETRY/ TC 1 MARKER: CPT

## 2025-03-31 PROCEDURE — 1090F PRES/ABSN URINE INCON ASSESS: CPT | Performed by: INTERNAL MEDICINE

## 2025-03-31 PROCEDURE — 83010 ASSAY OF HAPTOGLOBIN QUANT: CPT

## 2025-03-31 NOTE — PROGRESS NOTES
MA Rooming Questions  Patient: Cathleen Cartagena  MRN: [id number]    Date: 3/31/2025        1. Do you have any new issues?   yes - has been holding Hydrea          2. Do you need any refills on medications?    no    3. Have you had any imaging done since your last visit?   no    4. Have you been hospitalized or seen in the emergency room since your last visit here?   no    5. Did the patient have a depression screening completed today? No    No data recorded     PHQ-9 Given to (if applicable):               PHQ-9 Score (if applicable):                     [] Positive     []  Negative              Does question #9 need addressed (if applicable)                     [] Yes    []  No               Jasmyn Hook CMA      
Results   Component Value Date     03/13/2025    K 4.0 03/13/2025     03/13/2025    CO2 25 03/13/2025    BUN 10 03/13/2025    CREATININE 1.13 (H) 03/13/2025    GLUCOSE 93 03/13/2025    CALCIUM 8.7 03/13/2025    BILITOT 0.5 03/12/2025    ALKPHOS 95 03/12/2025    AST 9 03/12/2025    ALT 6 03/12/2025    LABGLOM 54 02/06/2025    GFRAA >60 09/21/2022    AGRATIO 1.6 04/17/2023    GLOB 2.0 03/12/2025    PHOS 3.3 09/21/2023    MG 1.8 09/21/2023    POCCA 1.31 09/23/2015    POCGLU 108 (H) 10/04/2021     Lab Results   Component Value Date     03/12/2025     No results found for: \"LD\"  Lab Results   Component Value Date    TSHHS 1.520 01/03/2024    T4FREE 1.29 10/24/2023    FT3 2.8 11/08/2016       Immunology:  No results found for: \"SPEP\", \"ALBUMINELP\", \"LABALPH\", \"LABBETA\", \"GAMGLOB\"    No results found for: \"KAPPAUVOL\", \"LAMBDAUVOL\", \"KLFLCR\"  No results found for: \"B2M\"    Coagulation Panel:  Lab Results   Component Value Date    PROTIME 20.9 (H) 01/04/2024    INR 1.8 01/04/2024    APTT 31.3 09/17/2020       Anemia Panel:  Lab Results   Component Value Date    KNFQHPQM81 722.0 10/30/2023    FOLATE 2.7 (L) 10/30/2023       Tumor Markers:  No results found for: \"\", \"CEA\", \"\", \"LABCA2\", \"PSA\"      Imaging: Reviewed     Pathology:Reviewed     Observations:  Performance Status: ECOG 0  Depression Status: No data recorded              Assessment & Plan:  PVera/Low ferritin: Low Erythropoetin and JAK2 + suggestive of polycythemia vera. Started Hydrea jan 2018, as H/O thrombosis(needing CABG) and also Possible TIA. leukocytosis in may 2019 was most probably sec to steroids, Flow at that point with left shift but otherwise normal.  Hydrea being adjusted according to the counts, currently taking 500 mg/day.  May 2023 CBC WBC of 23.8 hemoglobin of 16.7 hematocrit 59.4 MCV of 80.6 and platelets of 467.  LDH was elevated at 390 in April 2023.  Ferritin 77, B12 1558.  Recommended that she increases the Hydrea

## 2025-04-10 LAB — SURGICAL PATHOLOGY REPORT: NORMAL

## 2025-04-15 RX ORDER — HYDROXYUREA 500 MG/1
1000 CAPSULE ORAL DAILY
Qty: 180 CAPSULE | Refills: 1 | Status: SHIPPED | OUTPATIENT
Start: 2025-04-15

## 2025-05-27 NOTE — PROGRESS NOTES
Patient Name: Cathleen Cartagena  Patient : 1950  Patient MRN: [id number]     Primary Oncologist: Jolanta Bello MD  PCP: Dr Bergman        Date of Service: 2025      Chief Complaint:   Chief Complaint   Patient presents with    Follow-up        Active Problem list  1. Polycythemia vera (HCC)    2. Iron deficiency anemia, unspecified iron deficiency anemia type                   HPI:        Referred in 2016 for polycythemia, CBC with wbc 13.6, Hb 14.4 hct 47.8, plt 513K  16 Jak2 postive  Was started on Hydrea as h/o MI needing CABG and also h/o TIA x 2, dose adjusted as needed.   Then with [facility], had EGD, Cscope and VCE in may 2016  Cscope with diverticulosis and nonbleeding internal hemorrhoids  EGD with erythematous stomach but biopsy neg for h.pylori or malignancy  Capsule endoscopy with apthous ulcers in the small bowel.     18: Hydrea 1000mg OD    2018: Ct abdomen and pelvis:Impression  No renal or ureteral stone. No bladder stone.    Distal left ureter does not completely opacify but otherwise appears  unremarkable. Otherwise no filling defect within the renal or ureteral  collecting systems.    Mild irregularity along the posterior bladder wall which may be related to  ureterovesical junction bilaterally. Recommend further evaluation with  cystoscopy given hematuria.      19: CBC with wbc 11, Hb 20.2, hct 60, mcv 105, plt 171K  Creatinine 1.06, alk [id number]  ldh 296    3/12/19:CBC with wbc 11.9, Hb 15.6, hct 49.2, mcv 106, plt 384K  Ferritin 9  sats 7    2020 LDH of 149 CMP with sodium 138 potassium 4.6 glucose of 37 creatinine 1 calcium 10.3 ALT 8 AST 10 alk phos 115 CBC with WBC 11.7 hemoglobin 13.1 hematocrit 47.7 MCV of 101.7 platelets of 444      2020 EGD with Possible barrettes but biopsy negative     Had phlebotomy  and 2021 ASA and hydrea were held sec to cytopenia    21: Presented to ER with left jaw swelling and pain,which started 21. No

## 2025-06-02 ENCOUNTER — OFFICE VISIT (OUTPATIENT)
Dept: ONCOLOGY | Age: 75
End: 2025-06-02
Payer: MEDICARE

## 2025-06-02 ENCOUNTER — HOSPITAL ENCOUNTER (OUTPATIENT)
Dept: INFUSION THERAPY | Age: 75
Discharge: HOME | End: 2025-06-02
Payer: MEDICARE

## 2025-06-02 VITALS
HEART RATE: 88 BPM | BODY MASS INDEX: 24.88 KG/M2 | OXYGEN SATURATION: 93 % | WEIGHT: 135.2 LBS | RESPIRATION RATE: 16 BRPM | HEIGHT: 62 IN | SYSTOLIC BLOOD PRESSURE: 140 MMHG | TEMPERATURE: 97.7 F | DIASTOLIC BLOOD PRESSURE: 91 MMHG

## 2025-06-02 DIAGNOSIS — D45 POLYCYTHEMIA VERA (HCC): Primary | ICD-10-CM

## 2025-06-02 DIAGNOSIS — D45 POLYCYTHEMIA VERA (HCC): ICD-10-CM

## 2025-06-02 DIAGNOSIS — D50.9 IRON DEFICIENCY ANEMIA, UNSPECIFIED IRON DEFICIENCY ANEMIA TYPE: ICD-10-CM

## 2025-06-02 DIAGNOSIS — E53.8 B12 DEFICIENCY: ICD-10-CM

## 2025-06-02 DIAGNOSIS — C44.92 SQUAMOUS CELL CARCINOMA OF SKIN, UNSPECIFIED: ICD-10-CM

## 2025-06-02 LAB
ALBUMIN SERPL-MCNC: 3.7 G/DL (ref 3.4–5)
ALBUMIN/GLOB SERPL: 1.4 {RATIO} (ref 1.1–2.2)
ALP SERPL-CCNC: 140 U/L (ref 40–129)
ALT SERPL-CCNC: <5 U/L (ref 10–40)
ANION GAP SERPL CALCULATED.3IONS-SCNC: 12 MMOL/L (ref 9–17)
AST SERPL-CCNC: 16 U/L (ref 15–37)
BASOPHILS # BLD: 0 K/UL
BASOPHILS NFR BLD: 0 % (ref 0–1)
BILIRUB SERPL-MCNC: 1 MG/DL (ref 0–1)
BUN SERPL-MCNC: 9 MG/DL (ref 7–20)
CALCIUM SERPL-MCNC: 9 MG/DL (ref 8.3–10.6)
CHLORIDE SERPL-SCNC: 98 MMOL/L (ref 99–110)
CO2 SERPL-SCNC: 27 MMOL/L (ref 21–32)
CREAT SERPL-MCNC: 1 MG/DL (ref 0.6–1.2)
EOSINOPHIL # BLD: 0.61 K/UL
EOSINOPHILS RELATIVE PERCENT: 2 % (ref 0–3)
ERYTHROCYTE [DISTWIDTH] IN BLOOD BY AUTOMATED COUNT: 21.1 % (ref 11.7–14.9)
FERRITIN SERPL-MCNC: 46 NG/ML (ref 15–150)
FOLATE SERPL-MCNC: 23.6 NG/ML (ref 4.8–24.2)
GFR, ESTIMATED: 55 ML/MIN/1.73M2
GLUCOSE SERPL-MCNC: 92 MG/DL (ref 74–99)
HCT VFR BLD AUTO: 53.1 % (ref 37–47)
HGB BLD-MCNC: 15.8 G/DL (ref 12.5–16)
IRON SATN MFR SERPL: 11 % (ref 15–50)
IRON SERPL-MCNC: 29 UG/DL (ref 37–145)
LDH SERPL-CCNC: 383 U/L (ref 100–190)
LYMPHOCYTES NFR BLD: 2.13 K/UL
LYMPHOCYTES RELATIVE PERCENT: 7 % (ref 24–44)
MCH RBC QN AUTO: 27.3 PG (ref 27–31)
MCHC RBC AUTO-ENTMCNC: 29.8 G/DL (ref 32–36)
MCV RBC AUTO: 91.9 FL (ref 78–100)
MONOCYTES NFR BLD: 0.91 K/UL
MONOCYTES NFR BLD: 3 % (ref 0–5)
NEUTROPHILS NFR BLD: 88 % (ref 36–66)
NEUTS SEG NFR BLD: 26.75 K/UL
PLATELET # BLD AUTO: 647 K/UL (ref 140–440)
PLATELET ESTIMATE: NORMAL
PMV BLD AUTO: 10.4 FL (ref 7.5–11.1)
POTASSIUM SERPL-SCNC: 4.1 MMOL/L (ref 3.5–5.1)
PROT SERPL-MCNC: 6.4 G/DL (ref 6.4–8.2)
RBC # BLD AUTO: 5.78 M/UL (ref 4.2–5.4)
RBC # BLD: NORMAL 10*6/UL
SODIUM SERPL-SCNC: 137 MMOL/L (ref 136–145)
TIBC SERPL-MCNC: 261 UG/DL (ref 260–445)
UNSATURATED IRON BINDING CAPACITY: 232 UG/DL (ref 110–370)
VIT B12 SERPL-MCNC: 1056 PG/ML (ref 211–911)
WBC # BLD: NORMAL 10*3/UL
WBC OTHER # BLD: 30.4 K/UL (ref 4–10.5)

## 2025-06-02 PROCEDURE — 1090F PRES/ABSN URINE INCON ASSESS: CPT | Performed by: INTERNAL MEDICINE

## 2025-06-02 PROCEDURE — 85025 COMPLETE CBC W/AUTO DIFF WBC: CPT

## 2025-06-02 PROCEDURE — 83550 IRON BINDING TEST: CPT

## 2025-06-02 PROCEDURE — 1159F MED LIST DOCD IN RCRD: CPT | Performed by: INTERNAL MEDICINE

## 2025-06-02 PROCEDURE — G8399 PT W/DXA RESULTS DOCUMENT: HCPCS | Performed by: INTERNAL MEDICINE

## 2025-06-02 PROCEDURE — 82607 VITAMIN B-12: CPT

## 2025-06-02 PROCEDURE — G8420 CALC BMI NORM PARAMETERS: HCPCS | Performed by: INTERNAL MEDICINE

## 2025-06-02 PROCEDURE — 82784 ASSAY IGA/IGD/IGG/IGM EACH: CPT

## 2025-06-02 PROCEDURE — 99214 OFFICE O/P EST MOD 30 MIN: CPT | Performed by: INTERNAL MEDICINE

## 2025-06-02 PROCEDURE — 36415 COLL VENOUS BLD VENIPUNCTURE: CPT

## 2025-06-02 PROCEDURE — 1123F ACP DISCUSS/DSCN MKR DOCD: CPT | Performed by: INTERNAL MEDICINE

## 2025-06-02 PROCEDURE — 82728 ASSAY OF FERRITIN: CPT

## 2025-06-02 PROCEDURE — 83540 ASSAY OF IRON: CPT

## 2025-06-02 PROCEDURE — 1036F TOBACCO NON-USER: CPT | Performed by: INTERNAL MEDICINE

## 2025-06-02 PROCEDURE — 1126F AMNT PAIN NOTED NONE PRSNT: CPT | Performed by: INTERNAL MEDICINE

## 2025-06-02 PROCEDURE — 82746 ASSAY OF FOLIC ACID SERUM: CPT

## 2025-06-02 PROCEDURE — 80053 COMPREHEN METABOLIC PANEL: CPT

## 2025-06-02 PROCEDURE — 86258 DGP ANTIBODY EACH IG CLASS: CPT

## 2025-06-02 PROCEDURE — 83516 IMMUNOASSAY NONANTIBODY: CPT

## 2025-06-02 PROCEDURE — G8427 DOCREV CUR MEDS BY ELIG CLIN: HCPCS | Performed by: INTERNAL MEDICINE

## 2025-06-02 PROCEDURE — 83615 LACTATE (LD) (LDH) ENZYME: CPT

## 2025-06-02 PROCEDURE — 99212 OFFICE O/P EST SF 10 MIN: CPT

## 2025-06-02 RX ORDER — PREDNISONE 20 MG/1
TABLET ORAL
COMMUNITY
Start: 2025-04-18

## 2025-06-02 RX ORDER — DULOXETIN HYDROCHLORIDE 60 MG/1
60 CAPSULE, DELAYED RELEASE ORAL DAILY
COMMUNITY
Start: 2025-05-17

## 2025-06-02 NOTE — PROGRESS NOTES
MA/LPN Rooming Questions  Patient: Cathleen Cartagena  MRN: [id number]    Date: 6/2/2025        1. Do you have any new issues?   no         2. Do you need any refills on medications?    no    3. Have you had any imaging done since your last visit?   no    4. Have you been hospitalized or seen in the emergency room since your last visit here?   no    5. Did the patient have a depression screening completed today? No    No data recorded     PHQ-9 Given to (if applicable):               PHQ-9 Score (if applicable):                     [] Positive     []  Negative              Does question #9 need addressed (if applicable)                     [] Yes    []  No               Jasmyn Hook CMA

## 2025-06-05 LAB — TRANSGLUTAMINASE IGA: <1.02 FLU (ref 0–4.99)

## 2025-06-11 NOTE — PROGRESS NOTES
MA Rooming Questions  Patient: Candance Austria  MRN: [id number]    Date: 4/6/2021        1. Do you have any new issues?   no         2. Do you need any refills on medications?    no    3. Have you had any imaging done since your last visit?   no    4. Have you been hospitalized or seen in the emergency room since your last visit here?   no    5. Did the patient have a depression screening completed today?  No    No data recorded     PHQ-9 Given to (if applicable):               PHQ-9 Score (if applicable):                     [] Positive     []  Negative              Does question #9 need addressed (if applicable)                     [] Yes    []  No               Marimar Quesada MA No

## 2025-06-29 NOTE — PROGRESS NOTES
Patient Name: Cathleen Cartagena  Patient : 1950  Patient MRN: [id number]     Primary Oncologist: Jolanta Bello MD  PCP: Dr Bergman        Date of Service: 2025      Chief Complaint:   No chief complaint on file.       Active Problem list  No diagnosis found.                 HPI:        Referred in 2016 for polycythemia, CBC with wbc 13.6, Hb 14.4 hct 47.8, plt 513K  16 Jak2 postive  Was started on Hydrea as h/o MI needing CABG and also h/o TIA x 2, dose adjusted as needed.   Then with [facility], had EGD, Cscope and VCE in may 2016  Cscope with diverticulosis and nonbleeding internal hemorrhoids  EGD with erythematous stomach but biopsy neg for h.pylori or malignancy  Capsule endoscopy with apthous ulcers in the small bowel.     18: Hydrea 1000mg OD    2018: Ct abdomen and pelvis:Impression  No renal or ureteral stone. No bladder stone.    Distal left ureter does not completely opacify but otherwise appears  unremarkable. Otherwise no filling defect within the renal or ureteral  collecting systems.    Mild irregularity along the posterior bladder wall which may be related to  ureterovesical junction bilaterally. Recommend further evaluation with  cystoscopy given hematuria.      19: CBC with wbc 11, Hb 20.2, hct 60, mcv 105, plt 171K  Creatinine 1.06, alk [id number]  ldh 296    3/12/19:CBC with wbc 11.9, Hb 15.6, hct 49.2, mcv 106, plt 384K  Ferritin 9  sats 7    2020 LDH of 149 CMP with sodium 138 potassium 4.6 glucose of 37 creatinine 1 calcium 10.3 ALT 8 AST 10 alk phos 115 CBC with WBC 11.7 hemoglobin 13.1 hematocrit 47.7 MCV of 101.7 platelets of 444      2020 EGD with Possible barrettes but biopsy negative     Had phlebotomy  and 2021 ASA and hydrea were held sec to cytopenia    21: Presented to ER with left jaw swelling and pain,which started 21. No fever. No bleeding. . CT was done which revealed as below   CT Maxillofacial :  Effusion left TMJ and

## 2025-06-30 ENCOUNTER — HOSPITAL ENCOUNTER (OUTPATIENT)
Dept: INFUSION THERAPY | Age: 75
Discharge: HOME | End: 2025-06-30
Payer: MEDICARE

## 2025-06-30 ENCOUNTER — OFFICE VISIT (OUTPATIENT)
Dept: ONCOLOGY | Age: 75
End: 2025-06-30
Payer: MEDICARE

## 2025-06-30 VITALS
WEIGHT: 130.4 LBS | HEIGHT: 62 IN | DIASTOLIC BLOOD PRESSURE: 78 MMHG | TEMPERATURE: 97.6 F | OXYGEN SATURATION: 95 % | SYSTOLIC BLOOD PRESSURE: 117 MMHG | HEART RATE: 89 BPM | BODY MASS INDEX: 24 KG/M2

## 2025-06-30 DIAGNOSIS — E53.8 B12 DEFICIENCY: ICD-10-CM

## 2025-06-30 DIAGNOSIS — D45 POLYCYTHEMIA VERA (HCC): ICD-10-CM

## 2025-06-30 DIAGNOSIS — C44.92 SQUAMOUS CELL CARCINOMA OF SKIN, UNSPECIFIED: ICD-10-CM

## 2025-06-30 DIAGNOSIS — D50.9 IRON DEFICIENCY ANEMIA, UNSPECIFIED IRON DEFICIENCY ANEMIA TYPE: ICD-10-CM

## 2025-06-30 DIAGNOSIS — K90.9 INTESTINAL MALABSORPTION, UNSPECIFIED TYPE: ICD-10-CM

## 2025-06-30 DIAGNOSIS — D50.9 IRON DEFICIENCY ANEMIA, UNSPECIFIED IRON DEFICIENCY ANEMIA TYPE: Primary | ICD-10-CM

## 2025-06-30 LAB
ALBUMIN SERPL-MCNC: 3.9 G/DL (ref 3.4–5)
ALBUMIN/GLOB SERPL: 2 {RATIO} (ref 1.1–2.2)
ALP SERPL-CCNC: 95 U/L (ref 40–129)
ALT SERPL-CCNC: 7 U/L (ref 10–40)
ANION GAP SERPL CALCULATED.3IONS-SCNC: 10 MMOL/L (ref 9–17)
AST SERPL-CCNC: 12 U/L (ref 15–37)
BASOPHILS # BLD: 0 K/UL
BASOPHILS NFR BLD: 0 % (ref 0–1)
BILIRUB SERPL-MCNC: 0.6 MG/DL (ref 0–1)
BUN SERPL-MCNC: 11 MG/DL (ref 7–20)
CALCIUM SERPL-MCNC: 9.6 MG/DL (ref 8.3–10.6)
CHLORIDE SERPL-SCNC: 105 MMOL/L (ref 99–110)
CO2 SERPL-SCNC: 29 MMOL/L (ref 21–32)
CREAT SERPL-MCNC: 1 MG/DL (ref 0.6–1.2)
EOSINOPHIL # BLD: 0.39 K/UL
EOSINOPHILS RELATIVE PERCENT: 4 % (ref 0–3)
ERYTHROCYTE [DISTWIDTH] IN BLOOD BY AUTOMATED COUNT: 21.5 % (ref 11.7–14.9)
FERRITIN SERPL-MCNC: 21 NG/ML (ref 15–150)
GFR, ESTIMATED: 54 ML/MIN/1.73M2
GLUCOSE SERPL-MCNC: 90 MG/DL (ref 74–99)
HCT VFR BLD AUTO: 53.1 % (ref 37–47)
HGB BLD-MCNC: 15.7 G/DL (ref 12.5–16)
IRON SATN MFR SERPL: 11 % (ref 15–50)
IRON SERPL-MCNC: 33 UG/DL (ref 37–145)
LDH SERPL-CCNC: 249 U/L (ref 100–190)
LYMPHOCYTES NFR BLD: 1.55 K/UL
LYMPHOCYTES RELATIVE PERCENT: 16 % (ref 24–44)
MCH RBC QN AUTO: 25.7 PG (ref 27–31)
MCHC RBC AUTO-ENTMCNC: 29.6 G/DL (ref 32–36)
MCV RBC AUTO: 86.8 FL (ref 78–100)
MONOCYTES NFR BLD: 0.58 K/UL
MONOCYTES NFR BLD: 6 % (ref 0–5)
NEUTROPHILS NFR BLD: 74 % (ref 36–66)
NEUTS SEG NFR BLD: 7.18 K/UL
PLATELET # BLD AUTO: 155 K/UL (ref 140–440)
PLATELET CONFIRMATION: NORMAL
PLATELET ESTIMATE: NORMAL
POTASSIUM SERPL-SCNC: 4 MMOL/L (ref 3.5–5.1)
PROT SERPL-MCNC: 5.9 G/DL (ref 6.4–8.2)
RBC # BLD AUTO: 6.12 M/UL (ref 4.2–5.4)
RBC # BLD: NORMAL 10*6/UL
SODIUM SERPL-SCNC: 144 MMOL/L (ref 136–145)
TIBC SERPL-MCNC: 294 UG/DL (ref 260–445)
UNSATURATED IRON BINDING CAPACITY: 261 UG/DL (ref 110–370)
WBC # BLD: NORMAL 10*3/UL
WBC OTHER # BLD: 9.7 K/UL (ref 4–10.5)

## 2025-06-30 PROCEDURE — 83540 ASSAY OF IRON: CPT

## 2025-06-30 PROCEDURE — 1123F ACP DISCUSS/DSCN MKR DOCD: CPT | Performed by: INTERNAL MEDICINE

## 2025-06-30 PROCEDURE — 83615 LACTATE (LD) (LDH) ENZYME: CPT

## 2025-06-30 PROCEDURE — 85025 COMPLETE CBC W/AUTO DIFF WBC: CPT

## 2025-06-30 PROCEDURE — 1126F AMNT PAIN NOTED NONE PRSNT: CPT | Performed by: INTERNAL MEDICINE

## 2025-06-30 PROCEDURE — 99212 OFFICE O/P EST SF 10 MIN: CPT

## 2025-06-30 PROCEDURE — 36415 COLL VENOUS BLD VENIPUNCTURE: CPT

## 2025-06-30 PROCEDURE — 83516 IMMUNOASSAY NONANTIBODY: CPT

## 2025-06-30 PROCEDURE — G8399 PT W/DXA RESULTS DOCUMENT: HCPCS | Performed by: INTERNAL MEDICINE

## 2025-06-30 PROCEDURE — 80053 COMPREHEN METABOLIC PANEL: CPT

## 2025-06-30 PROCEDURE — 83550 IRON BINDING TEST: CPT

## 2025-06-30 PROCEDURE — 82728 ASSAY OF FERRITIN: CPT

## 2025-06-30 PROCEDURE — 1090F PRES/ABSN URINE INCON ASSESS: CPT | Performed by: INTERNAL MEDICINE

## 2025-06-30 PROCEDURE — 99214 OFFICE O/P EST MOD 30 MIN: CPT | Performed by: INTERNAL MEDICINE

## 2025-06-30 PROCEDURE — 86784 TRICHINELLA ANTIBODY: CPT

## 2025-06-30 PROCEDURE — 1036F TOBACCO NON-USER: CPT | Performed by: INTERNAL MEDICINE

## 2025-06-30 PROCEDURE — 1159F MED LIST DOCD IN RCRD: CPT | Performed by: INTERNAL MEDICINE

## 2025-06-30 PROCEDURE — 86258 DGP ANTIBODY EACH IG CLASS: CPT

## 2025-06-30 PROCEDURE — G8427 DOCREV CUR MEDS BY ELIG CLIN: HCPCS | Performed by: INTERNAL MEDICINE

## 2025-06-30 PROCEDURE — G8420 CALC BMI NORM PARAMETERS: HCPCS | Performed by: INTERNAL MEDICINE

## 2025-06-30 NOTE — PROGRESS NOTES
MA/LPN Rooming Questions  Patient: Cathleen Cartagena  MRN: [id number]    Date: 6/30/2025        1. Do you have any new issues?   no         2. Do you need any refills on medications?    no    3. Have you had any imaging done since your last visit?   no    4. Have you been hospitalized or seen in the emergency room since your last visit here?   no    5. Did the patient have a depression screening completed today? No    No data recorded     PHQ-9 Given to (if applicable):               PHQ-9 Score (if applicable):                     [] Positive     []  Negative              Does question #9 need addressed (if applicable)                     [] Yes    []  No               Eli Burgos MA

## 2025-07-01 LAB — TRANSGLUTAMINASE IGA: <1.02 FLU (ref 0–4.99)

## 2025-07-08 DIAGNOSIS — D45 POLYCYTHEMIA VERA (HCC): Primary | ICD-10-CM

## 2025-07-08 RX ORDER — 0.9 % SODIUM CHLORIDE 0.9 %
250 INTRAVENOUS SOLUTION INTRAVENOUS ONCE
OUTPATIENT
Start: 2025-07-08 | End: 2025-07-08

## 2025-07-08 NOTE — PROGRESS NOTES
Order placed for therapeutic phlebotomy: withdraw 500 mL blood x1 per physician instruction. Therapy plan added. Patient will be contacted with appointment time once scheduled at [facility] OP infusion.

## 2025-07-09 ENCOUNTER — CLINICAL DOCUMENTATION (OUTPATIENT)
Dept: INFUSION THERAPY | Age: 75
End: 2025-07-09

## 2025-07-16 ENCOUNTER — HOSPITAL ENCOUNTER (OUTPATIENT)
Dept: INFUSION THERAPY | Age: 75
Setting detail: INFUSION SERIES
Discharge: HOME | End: 2025-07-16
Payer: MEDICARE

## 2025-07-16 VITALS
RESPIRATION RATE: 16 BRPM | DIASTOLIC BLOOD PRESSURE: 72 MMHG | HEART RATE: 85 BPM | SYSTOLIC BLOOD PRESSURE: 110 MMHG | TEMPERATURE: 97.5 F

## 2025-07-16 DIAGNOSIS — D45 POLYCYTHEMIA VERA (HCC): Primary | ICD-10-CM

## 2025-07-16 PROCEDURE — 99195 PHLEBOTOMY: CPT

## 2025-07-16 RX ORDER — 0.9 % SODIUM CHLORIDE 0.9 %
250 INTRAVENOUS SOLUTION INTRAVENOUS ONCE
Status: CANCELLED | OUTPATIENT
Start: 2025-07-16 | End: 2025-07-16

## 2025-07-16 NOTE — PROGRESS NOTES
Diagnosis: polycythemia vera    Pre-Phlebotomy: /74, HR 87    Volume Removed: 520 grams Right arm    Post-Phlebotomy: /72, HR 85      Complications: none,    {Precious Iverson RN

## 2025-07-21 ENCOUNTER — CLINICAL DOCUMENTATION (OUTPATIENT)
Dept: ONCOLOGY | Age: 75
End: 2025-07-21

## 2025-08-05 LAB
ALBUMIN: 4.1 G/DL (ref 3.8–4.8)
ALP BLD-CCNC: 103 IU/L (ref 44–121)
ALT SERPL-CCNC: 8 IU/L (ref 0–32)
AST SERPL-CCNC: 13 IU/L (ref 0–40)
BASOPHILS ABSOLUTE: 0.1 X10E3/UL (ref 0–0.2)
BASOPHILS RELATIVE PERCENT: 1 %
BILIRUB SERPL-MCNC: 0.9 MG/DL (ref 0–1.2)
BUN / CREAT RATIO: 10 (ref 12–28)
BUN BLDV-MCNC: 10 MG/DL (ref 8–27)
CALCIUM SERPL-MCNC: 9.2 MG/DL (ref 8.7–10.3)
CHLORIDE BLD-SCNC: 101 MMOL/L (ref 96–106)
CO2: 23 MMOL/L (ref 20–29)
CREAT SERPL-MCNC: 0.97 MG/DL (ref 0.57–1)
EOSINOPHILS ABSOLUTE: 0.2 X10E3/UL (ref 0–0.4)
EOSINOPHILS RELATIVE PERCENT: 2 %
ESTIMATED GLOMERULAR FILTRATION RATE CREATININE EQUATION: 61 ML/MIN/1.73
FERRITIN: 21 NG/ML (ref 15–150)
FOLATE: >20 NG/ML
GLOBULIN: 2.2 G/DL (ref 1.5–4.5)
GLUCOSE BLD-MCNC: 96 MG/DL (ref 70–99)
HCT VFR BLD CALC: 51.2 % (ref 34–46.6)
HEMOGLOBIN: 15.2 G/DL (ref 11.1–15.9)
IMMATURE GRANS (ABS): 0.1 X10E3/UL (ref 0–0.1)
IMMATURE GRANULOCYTES %: 1 %
IRON % SATURATION: 17 % (ref 15–55)
IRON: 55 UG/DL (ref 27–139)
LACTATE DEHYDROGENASE: 243 IU/L (ref 119–226)
LYMPHOCYTES ABSOLUTE: 1.6 X10E3/UL (ref 0.7–3.1)
LYMPHOCYTES RELATIVE PERCENT: 19 %
MCH RBC QN AUTO: 26.7 PG (ref 26.6–33)
MCHC RBC AUTO-ENTMCNC: 29.7 G/DL (ref 31.5–35.7)
MCV RBC AUTO: 90 FL (ref 79–97)
MONOCYTES ABSOLUTE: 0.1 X10E3/UL (ref 0.1–0.9)
MONOCYTES RELATIVE PERCENT: 1 %
MORPHOLOGY: ABNORMAL
NEUTROPHILS ABSOLUTE: 6.3 X10E3/UL (ref 1.4–7)
NEUTROPHILS RELATIVE PERCENT: 76 %
PDW BLD-RTO: 23.7 % (ref 11.7–15.4)
PLATELET # BLD: 272 X10E3/UL (ref 150–450)
POTASSIUM SERPL-SCNC: 4.5 MMOL/L (ref 3.5–5.2)
RBC # BLD: 5.7 X10E6/UL (ref 3.77–5.28)
SODIUM BLD-SCNC: 140 MMOL/L (ref 134–144)
TOTAL IRON BINDING CAPACITY: 331 UG/DL (ref 250–450)
TOTAL PROTEIN: 6.3 G/DL (ref 6–8.5)
UNSATURATED IRON BINDING CAPACITY: 276 UG/DL (ref 118–369)
VITAMIN B-12: 670 PG/ML (ref 232–1245)
WBC # BLD: 8.2 X10E3/UL (ref 3.4–10.8)

## 2025-08-08 LAB
ALBUMIN: 4.3 G/DL (ref 3.8–4.8)
ALP BLD-CCNC: 115 IU/L (ref 44–121)
ALT SERPL-CCNC: 7 IU/L (ref 0–32)
AST SERPL-CCNC: 11 IU/L (ref 0–40)
BASOPHILS ABSOLUTE: 0.1 X10E3/UL (ref 0–0.2)
BASOPHILS RELATIVE PERCENT: 1 %
BILIRUB SERPL-MCNC: 0.6 MG/DL (ref 0–1.2)
BUN / CREAT RATIO: 10 (ref 12–28)
BUN BLDV-MCNC: 10 MG/DL (ref 8–27)
CALCIUM SERPL-MCNC: 9.6 MG/DL (ref 8.7–10.3)
CHLORIDE BLD-SCNC: 100 MMOL/L (ref 96–106)
CO2: 20 MMOL/L (ref 20–29)
CREAT SERPL-MCNC: 0.98 MG/DL (ref 0.57–1)
EOSINOPHILS ABSOLUTE: 0.2 X10E3/UL (ref 0–0.4)
EOSINOPHILS RELATIVE PERCENT: 2 %
ESTIMATED GLOMERULAR FILTRATION RATE CREATININE EQUATION: 60 ML/MIN/1.73
FERRITIN: 28 NG/ML (ref 15–150)
FOLATE: >20 NG/ML
GLIADIN ANTIBODIES IGG: 2 UNITS (ref 0–19)
GLOBULIN: 2.3 G/DL (ref 1.5–4.5)
GLUCOSE BLD-MCNC: ABNORMAL MG/DL
HCT VFR BLD CALC: 54.1 % (ref 34–46.6)
HEMOGLOBIN: 16 G/DL (ref 11.1–15.9)
IMMATURE GRANS (ABS): 0.1 X10E3/UL (ref 0–0.1)
IMMATURE GRANULOCYTES %: 1 %
IRON % SATURATION: 12 % (ref 15–55)
IRON: 37 UG/DL (ref 27–139)
LYMPHOCYTES ABSOLUTE: 1.5 X10E3/UL (ref 0.7–3.1)
LYMPHOCYTES RELATIVE PERCENT: 15 %
Lab: <0.2 MM
MCH RBC QN AUTO: 26.2 PG (ref 26.6–33)
MCHC RBC AUTO-ENTMCNC: 29.6 G/DL (ref 31.5–35.7)
MCV RBC AUTO: 89 FL (ref 79–97)
MONOCYTES ABSOLUTE: 0.1 X10E3/UL (ref 0.1–0.9)
MONOCYTES RELATIVE PERCENT: 1 %
MORPHOLOGY: ABNORMAL
NEUTROPHILS ABSOLUTE: 8.2 X10E3/UL (ref 1.4–7)
NEUTROPHILS RELATIVE PERCENT: 80 %
PDW BLD-RTO: 23.1 % (ref 11.7–15.4)
PLATELET # BLD: 411 X10E3/UL (ref 150–450)
POTASSIUM SERPL-SCNC: ABNORMAL MMOL/L
RBC # BLD: 6.1 X10E6/UL (ref 3.77–5.28)
SODIUM BLD-SCNC: 140 MMOL/L (ref 134–144)
TOTAL IRON BINDING CAPACITY: 314 UG/DL (ref 250–450)
TOTAL PROTEIN: 6.6 G/DL (ref 6–8.5)
TRANSGLUTAMINASE IGA: <2 U/ML (ref 0–3)
UNSATURATED IRON BINDING CAPACITY: 277 UG/DL (ref 118–369)
VITAMIN B-12: 727 PG/ML (ref 232–1245)
WBC # BLD: 10.2 X10E3/UL (ref 3.4–10.8)

## 2025-08-21 ENCOUNTER — CLINICAL DOCUMENTATION (OUTPATIENT)
Dept: ONCOLOGY | Age: 75
End: 2025-08-21

## 2025-09-03 LAB
ALBUMIN: 3.9 G/DL (ref 3.8–4.8)
ALP BLD-CCNC: 99 IU/L (ref 44–121)
ALT SERPL-CCNC: 6 IU/L (ref 0–32)
AST SERPL-CCNC: 13 IU/L (ref 0–40)
BASOPHILS ABSOLUTE: 0 X10E3/UL (ref 0–0.2)
BASOPHILS RELATIVE PERCENT: 1 %
BILIRUB SERPL-MCNC: 0.6 MG/DL (ref 0–1.2)
BUN / CREAT RATIO: 13 (ref 12–28)
BUN BLDV-MCNC: 13 MG/DL (ref 8–27)
CALCIUM SERPL-MCNC: 9 MG/DL (ref 8.7–10.3)
CHLORIDE BLD-SCNC: 102 MMOL/L (ref 96–106)
CO2: 25 MMOL/L (ref 20–29)
CREAT SERPL-MCNC: 0.99 MG/DL (ref 0.57–1)
EOSINOPHILS ABSOLUTE: 0.1 X10E3/UL (ref 0–0.4)
EOSINOPHILS RELATIVE PERCENT: 2 %
ESTIMATED GLOMERULAR FILTRATION RATE CREATININE EQUATION: 59 ML/MIN/1.73
FERRITIN: 83 NG/ML (ref 15–150)
FOLATE: >20 NG/ML
GLOBULIN: 2.2 G/DL (ref 1.5–4.5)
GLUCOSE BLD-MCNC: 88 MG/DL (ref 70–99)
HCT VFR BLD CALC: 47.9 % (ref 34–46.6)
HEMOGLOBIN: 14.7 G/DL (ref 11.1–15.9)
IMMATURE GRANS (ABS): 0 X10E3/UL (ref 0–0.1)
IMMATURE GRANULOCYTES %: 1 %
IRON % SATURATION: 36 % (ref 15–55)
IRON: 83 UG/DL (ref 27–139)
LACTATE DEHYDROGENASE: 278 IU/L (ref 119–226)
LD ISOENZYME 1: 29 % (ref 17–32)
LD ISOENZYME 2: 39 % (ref 25–40)
LD ISOENZYME 3: 20 % (ref 17–27)
LD ISOENZYME 4: 7 % (ref 5–13)
LD ISOENZYME 5: 5 % (ref 4–20)
LYMPHOCYTES ABSOLUTE: 1.3 X10E3/UL (ref 0.7–3.1)
LYMPHOCYTES RELATIVE PERCENT: 30 %
MCH RBC QN AUTO: 29.1 PG (ref 26.6–33)
MCHC RBC AUTO-ENTMCNC: 30.7 G/DL (ref 31.5–35.7)
MCV RBC AUTO: 95 FL (ref 79–97)
MONOCYTES ABSOLUTE: 0.1 X10E3/UL (ref 0.1–0.9)
MONOCYTES RELATIVE PERCENT: 2 %
MORPHOLOGY: ABNORMAL
NEUTROPHILS ABSOLUTE: 2.9 X10E3/UL (ref 1.4–7)
NEUTROPHILS RELATIVE PERCENT: 64 %
PDW BLD-RTO: 24.9 % (ref 11.7–15.4)
PLATELET # BLD: 318 X10E3/UL (ref 150–450)
POTASSIUM SERPL-SCNC: 4.3 MMOL/L (ref 3.5–5.2)
RBC # BLD: 5.05 X10E6/UL (ref 3.77–5.28)
SODIUM BLD-SCNC: 142 MMOL/L (ref 134–144)
TOTAL IRON BINDING CAPACITY: 233 UG/DL (ref 250–450)
TOTAL PROTEIN: 6.1 G/DL (ref 6–8.5)
UNSATURATED IRON BINDING CAPACITY: 150 UG/DL (ref 118–369)
VITAMIN B-12: 563 PG/ML (ref 232–1245)
WBC # BLD: 4.4 X10E3/UL (ref 3.4–10.8)

## 2025-09-05 ENCOUNTER — HOSPITAL ENCOUNTER (OUTPATIENT)
Dept: INFUSION THERAPY | Age: 75
Discharge: HOME | End: 2025-09-05

## (undated) DEVICE — STERILE LATEX POWDER-FREE SURGICAL GLOVESWITH NITRILE COATING: Brand: PROTEXIS

## (undated) DEVICE — CONVERTORS STOCKINETTE: Brand: CONVERTORS

## (undated) DEVICE — GLOVE 8 LTX ST TRIFLEX POWDER LK

## (undated) DEVICE — LIGHT HANDLE: Brand: DEVON

## (undated) DEVICE — Z INACTIVE NO ACTIVE SUPPLIER APPLICATOR MEDICATED 26 CC TINT HI-LITE ORNG STRL CHLORAPREP

## (undated) DEVICE — YANKAUER,FLEXIBLE HANDLE,REGLR CAPACITY: Brand: MEDLINE INDUSTRIES, INC.

## (undated) DEVICE — MARKER SURG SKIN UTIL REGULAR/FINE 2 TIP W/ RUL AND 9 LBL

## (undated) DEVICE — TUBING, SUCTION, 9/32" X 10', STRAIGHT: Brand: MEDLINE

## (undated) DEVICE — DRAPE,EXTREMITY,89X128,STERILE: Brand: MEDLINE

## (undated) DEVICE — PENCIL ES CRD L10FT HND SWCHING ROCK SWCH W/ EDGE COAT BLDE

## (undated) DEVICE — COUNTER NDL 30 COUNT FOAM STRP SGL MAG

## (undated) DEVICE — VITAL SIGNS™ ORAL AIRWAY BERMAN: Brand: VITAL SIGNS™

## (undated) DEVICE — BLANKET WRM W29.9XL79.1IN UP BODY FORC AIR MISTRAL-AIR

## (undated) DEVICE — AIRLIFE™ NASAL OXYGEN CANNULA CURVED, NONFLARED TIP, WITH 7' FEET (2.1 M) CRUSH RESISTANT TUBING, OVER-THE-EAR STYLE: Brand: AIRLIFE™

## (undated) DEVICE — GARMENT REPROCESS CALF FLOWTRON

## (undated) DEVICE — DRESSING PETRO W3XL3IN OIL EMUL N ADH GZ KNIT IMPREG CELOS

## (undated) DEVICE — SPONGE GZ W4XL8IN COT WVN 12 PLY

## (undated) DEVICE — ELECTRODE ES AD CRDLSS PT RET REM POLYHESIVE

## (undated) DEVICE — CIRCUIT ANES AD CUST

## (undated) DEVICE — SYRINGE IRRIG 60ML SFT PLIABLE BLB EZ TO GRP 1 HND USE W/

## (undated) DEVICE — HOOK LOCK LATEX FREE ELASTIC BANDAGE 4INX5YD

## (undated) DEVICE — Z DISCONTINUED USE 2220190 SUTURE VICRYL SZ 3-0 L27IN ABSRB UD L26MM SH 1/2 CIR J416H

## (undated) DEVICE — BANDAGE,SELF ADHRNT,COFLEX,4"X5YD,STRL: Brand: COLABEL